# Patient Record
Sex: FEMALE | Race: BLACK OR AFRICAN AMERICAN | NOT HISPANIC OR LATINO | Employment: UNEMPLOYED | URBAN - METROPOLITAN AREA
[De-identification: names, ages, dates, MRNs, and addresses within clinical notes are randomized per-mention and may not be internally consistent; named-entity substitution may affect disease eponyms.]

---

## 2017-06-01 ENCOUNTER — LAB VISIT (OUTPATIENT)
Dept: LAB | Facility: HOSPITAL | Age: 42
End: 2017-06-01
Attending: INTERNAL MEDICINE
Payer: MEDICAID

## 2017-06-01 ENCOUNTER — OFFICE VISIT (OUTPATIENT)
Dept: FAMILY MEDICINE | Facility: CLINIC | Age: 42
End: 2017-06-01
Payer: MEDICAID

## 2017-06-01 VITALS
OXYGEN SATURATION: 99 % | DIASTOLIC BLOOD PRESSURE: 88 MMHG | WEIGHT: 221.56 LBS | BODY MASS INDEX: 43.5 KG/M2 | HEART RATE: 93 BPM | SYSTOLIC BLOOD PRESSURE: 138 MMHG | HEIGHT: 60 IN | RESPIRATION RATE: 17 BRPM | TEMPERATURE: 98 F

## 2017-06-01 DIAGNOSIS — I10 ESSENTIAL HYPERTENSION: Primary | ICD-10-CM

## 2017-06-01 DIAGNOSIS — I10 ESSENTIAL HYPERTENSION: ICD-10-CM

## 2017-06-01 DIAGNOSIS — R20.2 PARESTHESIA: ICD-10-CM

## 2017-06-01 DIAGNOSIS — R06.01 ORTHOPNEA: ICD-10-CM

## 2017-06-01 DIAGNOSIS — R93.1 DECREASED CARDIAC EJECTION FRACTION: ICD-10-CM

## 2017-06-01 LAB
ALBUMIN SERPL BCP-MCNC: 3.4 G/DL
ALP SERPL-CCNC: 67 U/L
ALT SERPL W/O P-5'-P-CCNC: 10 U/L
ANION GAP SERPL CALC-SCNC: 8 MMOL/L
AST SERPL-CCNC: 18 U/L
BASOPHILS # BLD AUTO: 0.05 K/UL
BASOPHILS NFR BLD: 0.6 %
BILIRUB SERPL-MCNC: 0.8 MG/DL
BUN SERPL-MCNC: 10 MG/DL
CALCIUM SERPL-MCNC: 9.6 MG/DL
CHLORIDE SERPL-SCNC: 103 MMOL/L
CO2 SERPL-SCNC: 26 MMOL/L
CREAT SERPL-MCNC: 0.8 MG/DL
DIFFERENTIAL METHOD: ABNORMAL
EOSINOPHIL # BLD AUTO: 0.1 K/UL
EOSINOPHIL NFR BLD: 1.2 %
ERYTHROCYTE [DISTWIDTH] IN BLOOD BY AUTOMATED COUNT: 16 %
EST. GFR  (AFRICAN AMERICAN): >60 ML/MIN/1.73 M^2
EST. GFR  (NON AFRICAN AMERICAN): >60 ML/MIN/1.73 M^2
GLUCOSE SERPL-MCNC: 71 MG/DL
HCT VFR BLD AUTO: 36.1 %
HGB BLD-MCNC: 11.8 G/DL
LYMPHOCYTES # BLD AUTO: 2 K/UL
LYMPHOCYTES NFR BLD: 22.1 %
MAGNESIUM SERPL-MCNC: 2 MG/DL
MCH RBC QN AUTO: 24.7 PG
MCHC RBC AUTO-ENTMCNC: 32.7 %
MCV RBC AUTO: 76 FL
MONOCYTES # BLD AUTO: 0.7 K/UL
MONOCYTES NFR BLD: 8 %
NEUTROPHILS # BLD AUTO: 6.1 K/UL
NEUTROPHILS NFR BLD: 68.1 %
PLATELET # BLD AUTO: 332 K/UL
PMV BLD AUTO: 9.8 FL
POTASSIUM SERPL-SCNC: 4.2 MMOL/L
PROT SERPL-MCNC: 8 G/DL
RBC # BLD AUTO: 4.77 M/UL
SODIUM SERPL-SCNC: 137 MMOL/L
TSH SERPL DL<=0.005 MIU/L-ACNC: 1.2 UIU/ML
WBC # BLD AUTO: 8.91 K/UL

## 2017-06-01 PROCEDURE — 36415 COLL VENOUS BLD VENIPUNCTURE: CPT | Mod: PN

## 2017-06-01 PROCEDURE — 83735 ASSAY OF MAGNESIUM: CPT

## 2017-06-01 PROCEDURE — 85025 COMPLETE CBC W/AUTO DIFF WBC: CPT

## 2017-06-01 PROCEDURE — 99214 OFFICE O/P EST MOD 30 MIN: CPT | Mod: S$PBB,,, | Performed by: INTERNAL MEDICINE

## 2017-06-01 PROCEDURE — 82607 VITAMIN B-12: CPT

## 2017-06-01 PROCEDURE — 84443 ASSAY THYROID STIM HORMONE: CPT

## 2017-06-01 PROCEDURE — 80053 COMPREHEN METABOLIC PANEL: CPT

## 2017-06-01 PROCEDURE — 99999 PR PBB SHADOW E&M-EST. PATIENT-LVL III: CPT | Mod: PBBFAC,,, | Performed by: INTERNAL MEDICINE

## 2017-06-02 LAB — VIT B12 SERPL-MCNC: 806 PG/ML

## 2017-06-05 ENCOUNTER — TELEPHONE (OUTPATIENT)
Dept: FAMILY MEDICINE | Facility: CLINIC | Age: 42
End: 2017-06-05

## 2017-06-05 NOTE — TELEPHONE ENCOUNTER
Spoke with patient and advised of results and recommendations below    Appointment with Cardiology scheduled 6-6-17    Verbalized understating re: will follow up with results after   Once her echo has been completed

## 2017-06-06 ENCOUNTER — HOSPITAL ENCOUNTER (OUTPATIENT)
Dept: CARDIOLOGY | Facility: HOSPITAL | Age: 42
Discharge: HOME | End: 2017-06-06
Attending: INTERNAL MEDICINE
Payer: MEDICAID

## 2017-06-06 DIAGNOSIS — R93.1 DECREASED CARDIAC EJECTION FRACTION: ICD-10-CM

## 2017-06-06 DIAGNOSIS — R06.01 ORTHOPNEA: ICD-10-CM

## 2017-06-06 LAB
DIASTOLIC DYSFUNCTION: NO
ESTIMATED PA SYSTOLIC PRESSURE: 28.2
RETIRED EF AND QEF - SEE NOTES: 55 (ref 55–65)
TRICUSPID VALVE REGURGITATION: NORMAL

## 2017-06-06 PROCEDURE — 93306 TTE W/DOPPLER COMPLETE: CPT

## 2017-06-06 PROCEDURE — 93306 TTE W/DOPPLER COMPLETE: CPT | Mod: 26,,, | Performed by: INTERNAL MEDICINE

## 2017-06-07 ENCOUNTER — TELEPHONE (OUTPATIENT)
Dept: FAMILY MEDICINE | Facility: CLINIC | Age: 42
End: 2017-06-07

## 2017-08-01 DIAGNOSIS — I10 ESSENTIAL HYPERTENSION: ICD-10-CM

## 2017-08-01 RX ORDER — METOPROLOL SUCCINATE 100 MG/1
TABLET, EXTENDED RELEASE ORAL
Qty: 30 TABLET | Refills: 2 | Status: SHIPPED | OUTPATIENT
Start: 2017-08-01 | End: 2017-09-11 | Stop reason: SDUPTHER

## 2017-09-11 ENCOUNTER — OFFICE VISIT (OUTPATIENT)
Dept: FAMILY MEDICINE | Facility: CLINIC | Age: 42
End: 2017-09-11
Payer: MEDICAID

## 2017-09-11 VITALS
BODY MASS INDEX: 43.72 KG/M2 | HEART RATE: 85 BPM | OXYGEN SATURATION: 99 % | TEMPERATURE: 98 F | WEIGHT: 222.69 LBS | SYSTOLIC BLOOD PRESSURE: 124 MMHG | HEIGHT: 60 IN | DIASTOLIC BLOOD PRESSURE: 98 MMHG | RESPIRATION RATE: 17 BRPM

## 2017-09-11 DIAGNOSIS — M25.562 ACUTE PAIN OF LEFT KNEE: Primary | ICD-10-CM

## 2017-09-11 DIAGNOSIS — I10 ESSENTIAL HYPERTENSION: ICD-10-CM

## 2017-09-11 PROCEDURE — 3008F BODY MASS INDEX DOCD: CPT | Mod: ,,, | Performed by: INTERNAL MEDICINE

## 2017-09-11 PROCEDURE — 3080F DIAST BP >= 90 MM HG: CPT | Mod: ,,, | Performed by: INTERNAL MEDICINE

## 2017-09-11 PROCEDURE — 99999 PR PBB SHADOW E&M-EST. PATIENT-LVL III: CPT | Mod: PBBFAC,,, | Performed by: INTERNAL MEDICINE

## 2017-09-11 PROCEDURE — 99214 OFFICE O/P EST MOD 30 MIN: CPT | Mod: S$PBB,,, | Performed by: INTERNAL MEDICINE

## 2017-09-11 PROCEDURE — 3074F SYST BP LT 130 MM HG: CPT | Mod: ,,, | Performed by: INTERNAL MEDICINE

## 2017-09-11 PROCEDURE — 99213 OFFICE O/P EST LOW 20 MIN: CPT | Mod: PBBFAC,PN | Performed by: INTERNAL MEDICINE

## 2017-09-11 RX ORDER — METOPROLOL SUCCINATE 100 MG/1
TABLET, EXTENDED RELEASE ORAL
Qty: 30 TABLET | Refills: 5 | Status: SHIPPED | OUTPATIENT
Start: 2017-09-11 | End: 2018-08-29 | Stop reason: SDUPTHER

## 2017-09-11 RX ORDER — MELOXICAM 15 MG/1
15 TABLET ORAL DAILY
Qty: 301 TABLET | Refills: 1 | Status: SHIPPED | OUTPATIENT
Start: 2017-09-11 | End: 2017-09-12 | Stop reason: SDUPTHER

## 2017-09-11 NOTE — PROGRESS NOTES
Subjective:       Patient ID: Asha Paige is a 41 y.o. female.    Chief Complaint: Medication Refill and Knee Pain    She presents for follow-up of her blood pressure and for refills.  She reports compliance with her medications.  She does note that her blood pressure is better when she is eating properly.  Otherwise she complains of a two-month history of intermittent left medial knee pain which is been more constant over the past 2 weeks.  It improves with elevation.  It is worse with bending and walking, particularly upstairs.  He does radiate times.  She's had no improvement with Motrin.  Her pain as 5 out of 10.      Medication Refill   This is a new problem. The current episode started more than 1 month ago. The problem occurs constantly. The problem has been gradually worsening. Associated symptoms include arthralgias. Pertinent negatives include no joint swelling. The symptoms are aggravated by bending and walking. She has tried NSAIDs for the symptoms. The treatment provided no relief.   Knee Pain        Review of Systems   Musculoskeletal: Positive for arthralgias. Negative for joint swelling.       Objective:      Physical Exam   Constitutional: She is oriented to person, place, and time. She appears well-developed and well-nourished. No distress.   HENT:   Head: Normocephalic and atraumatic.   Right Ear: External ear normal.   Left Ear: External ear normal.   Eyes: Conjunctivae are normal. Pupils are equal, round, and reactive to light. Left eye exhibits no discharge. No scleral icterus.   Cardiovascular: Normal rate, regular rhythm and normal heart sounds.  Exam reveals no gallop and no friction rub.    No murmur heard.  Pulmonary/Chest: Effort normal and breath sounds normal. No respiratory distress. She has no wheezes. She has no rales.   Musculoskeletal:        Left knee: She exhibits normal range of motion, no swelling and no effusion. Tenderness found. Medial joint line tenderness noted.    Neurological: She is alert and oriented to person, place, and time. No cranial nerve deficit.   Skin: Skin is warm and dry.   Psychiatric: She has a normal mood and affect.   Vitals reviewed.      Assessment:       1. Acute pain of left knee    2. Essential hypertension        Plan:       Asha was seen today for medication refill and knee pain.    Diagnoses and all orders for this visit:    Acute pain of left knee - 2 month history of left knee pain worse over the past 2 weeks.  No history of injury.  Trial of meloxicam.  F/u if no improvement.  Will consider xray and injection at that time.   -     meloxicam (MOBIC) 15 MG tablet; Take 1 tablet (15 mg total) by mouth once daily.    Essential hypertension - DBP elevated today.  F/u 1 week for bp check.  Continue current medication for now.  -     metoprolol succinate (TOPROL-XL) 100 MG 24 hr tablet; TAKE 1 TABLET(100 MG) BY MOUTH DAILY       F/u 1 week

## 2017-09-12 DIAGNOSIS — M25.562 ACUTE PAIN OF LEFT KNEE: ICD-10-CM

## 2017-09-12 RX ORDER — MELOXICAM 15 MG/1
15 TABLET ORAL DAILY
Qty: 30 TABLET | Refills: 1 | Status: SHIPPED | OUTPATIENT
Start: 2017-09-12 | End: 2019-12-10 | Stop reason: CLARIF

## 2017-09-12 NOTE — TELEPHONE ENCOUNTER
Spoke to the pharmacist @ [facility] they never received the script for Mobic please resend.    Contact: Self   Patient says her medication never went through to the pharmacy. Please call at [phone]

## 2017-12-15 ENCOUNTER — HOSPITAL ENCOUNTER (EMERGENCY)
Facility: HOSPITAL | Age: 42
Discharge: HOME | End: 2017-12-15
Attending: EMERGENCY MEDICINE
Payer: MEDICAID

## 2017-12-15 VITALS
WEIGHT: 223 LBS | RESPIRATION RATE: 20 BRPM | TEMPERATURE: 99 F | BODY MASS INDEX: 43.78 KG/M2 | DIASTOLIC BLOOD PRESSURE: 101 MMHG | HEART RATE: 100 BPM | HEIGHT: 60 IN | SYSTOLIC BLOOD PRESSURE: 171 MMHG | OXYGEN SATURATION: 100 %

## 2017-12-15 DIAGNOSIS — J32.9 SINUSITIS, UNSPECIFIED CHRONICITY, UNSPECIFIED LOCATION: Primary | ICD-10-CM

## 2017-12-15 LAB
B-HCG UR QL: NEGATIVE
CTP QC/QA: YES

## 2017-12-15 PROCEDURE — 81025 URINE PREGNANCY TEST: CPT | Performed by: PHYSICIAN ASSISTANT

## 2017-12-15 PROCEDURE — 99283 EMERGENCY DEPT VISIT LOW MDM: CPT | Mod: 25

## 2017-12-15 RX ORDER — AMOXICILLIN AND CLAVULANATE POTASSIUM 875; 125 MG/1; MG/1
1 TABLET, FILM COATED ORAL 2 TIMES DAILY
Qty: 28 TABLET | Refills: 0 | Status: SHIPPED | OUTPATIENT
Start: 2017-12-15 | End: 2017-12-29

## 2017-12-15 RX ORDER — LORATADINE 10 MG/1
10 TABLET ORAL DAILY
COMMUNITY
End: 2019-12-10 | Stop reason: CLARIF

## 2017-12-15 NOTE — ED PROVIDER NOTES
"Encounter Date: 12/15/2017    SCRIBE #1 NOTE: I, Barrett Loza DINA, am scribing for, and in the presence of,  Gerard Calle PA-C. I have scribed the following portions of the note - Other sections scribed: HPI and ROS.       History     Chief Complaint   Patient presents with    Nasal Congestion     "stuffy nose and my ears are starting to hurt, off and on for about a wk"     CC: Nasal Congestion     HPI: This 42 y.o. female with CHF, HTN, and pulmonary embolism  presents to the ED c/o acute onset, nasal congestion with cough and rhinorrhea x1 week. Pt states her symptoms began as a cough and progressively worsened. Pt reports hx of sinus problems. She describes the episode as, "I have had no relief, its like it comes and goes." Pt reports she has a productive cough and describes the sputum as yellow. Pt also reports ear pain when she blows her nose. Pt reports using Tylenol, Claritin, and Flonase with minimal alleviation. Pt denies use of antibiotics. No alleviating factors. Pt denies fever, vomiting, and diarrhea        The history is provided by the patient. No  was used.     Review of patient's allergies indicates:   Allergen Reactions    Lisinopril Other (See Comments)     Angioedema     Past Medical History:   Diagnosis Date    CHF (congestive heart failure)     History of pulmonary embolism 2005    Hypertension      Past Surgical History:   Procedure Laterality Date    Plate in right arm        Family History   Problem Relation Age of Onset    Cancer Maternal Grandmother     Breast cancer Neg Hx     Ovarian cancer Neg Hx      Social History   Substance Use Topics    Smoking status: Never Smoker    Smokeless tobacco: Never Used    Alcohol use Yes      Comment: Social      Review of Systems   Constitutional: Negative for fever.   HENT: Positive for congestion, ear pain and rhinorrhea. Negative for sore throat.    Respiratory: Positive for cough. Negative for shortness of breath.  "   Cardiovascular: Negative for chest pain.   Gastrointestinal: Negative for nausea.   Genitourinary: Negative for dysuria.   Musculoskeletal: Negative for back pain.   Skin: Negative for rash.   Neurological: Negative for weakness.   Hematological: Does not bruise/bleed easily.       Physical Exam     Initial Vitals [12/15/17 1454]   BP Pulse Resp Temp SpO2   (!) 171/101 103 20 99.1 °F (37.3 °C) 100 %      MAP       124.33         Physical Exam    Vitals reviewed.  Constitutional: She appears well-developed and well-nourished. She is not diaphoretic. No distress.   HENT:   Head: Normocephalic and atraumatic.   Right Ear: External ear normal.   Left Ear: External ear normal.   Nose: Mucosal edema and rhinorrhea present. Right sinus exhibits maxillary sinus tenderness and frontal sinus tenderness. Left sinus exhibits maxillary sinus tenderness and frontal sinus tenderness.   Mouth/Throat: Oropharynx is clear and moist. No oropharyngeal exudate.   Eyes: Conjunctivae are normal. No scleral icterus.   Neck: Normal range of motion. Neck supple. No JVD present.   Cardiovascular: Normal rate, regular rhythm, normal heart sounds and intact distal pulses.   Pulmonary/Chest: Breath sounds normal. No respiratory distress. She has no wheezes. She has no rhonchi. She has no rales. She exhibits no tenderness.   Musculoskeletal: Normal range of motion. She exhibits no edema or tenderness.   Lymphadenopathy:     She has no cervical adenopathy.   Neurological: She is alert and oriented to person, place, and time.   Skin: Skin is warm and dry.         ED Course   Procedures  Labs Reviewed   POCT URINE PREGNANCY             Medical Decision Making:   Initial Assessment:   42-year-old female with hypertension complains of sinus congestion with rhinorrhea, ear pain, and cough ×1 week with slight improvement with conservative treatment, then worsening again yesterday and today.  She denies fever, shortness of breath,  myalgias.  Differential Diagnosis:   Bacterial sinusitis, ALLERGIC rhinitis, other viral URI, and less likely otitis media, mastoiditis, venous sinus thrombosis  ED Management:  Patient presents mildly uncomfortable, nontoxic appearing, hypertensive and afebrile.  She takes metoprolol for blood pressure but has not taken it today.  No chest pain or shortness of breath.  No leg swelling.  She has sinus tenderness with significant nasal mucosal edema and rhinorrhea.  Given the progression of her symptoms despite conservative treatment I suspect this may be a bacterial sinusitis that would benefit with antibiotics and will treat with Augmentin.  Patient encouraged to continue using antihistamine, and intranasal steroid, and to follow-up with PCP.  She was also encouraged to continue taking her blood pressure medication as prescribed.  I do not suspect endorgan damage and believe patient is safe for discharge.            Scribe Attestation:   Scribe #1: I performed the above scribed service and the documentation accurately describes the services I performed. I attest to the accuracy of the note.    Attending Attestation:           Physician Attestation for Scribe:  Physician Attestation Statement for Scribe #1: I, Gerard Calle PA-C, reviewed documentation, as scribed by Barrett Loza II in my presence, and it is both accurate and complete.                 ED Course      Clinical Impression:   The encounter diagnosis was Sinusitis, unspecified chronicity, unspecified location.                           Gerard Calle PA-C  12/15/17 1384

## 2018-08-18 DIAGNOSIS — I10 ESSENTIAL HYPERTENSION: ICD-10-CM

## 2018-08-19 RX ORDER — METOPROLOL SUCCINATE 100 MG/1
TABLET, EXTENDED RELEASE ORAL
Qty: 30 TABLET | Refills: 5 | OUTPATIENT
Start: 2018-08-19

## 2018-08-29 DIAGNOSIS — I10 ESSENTIAL HYPERTENSION: ICD-10-CM

## 2018-08-29 RX ORDER — METOPROLOL SUCCINATE 100 MG/1
TABLET, EXTENDED RELEASE ORAL
Qty: 30 TABLET | Refills: 1 | Status: SHIPPED | OUTPATIENT
Start: 2018-08-29 | End: 2018-11-26 | Stop reason: SDUPTHER

## 2018-08-29 NOTE — TELEPHONE ENCOUNTER
----- Message from Alvina Jones sent at 8/29/2018  7:30 AM CDT -----  Contact: Self/[phone]  Refill:  metoprolol succinate (TOPROL-XL) 100 MG 24 hr tablet    .  [facility] [address] [address] [place] [address] [address]  [facility] [address] [place] [address] [address]   [facility] [place] [place] [address]  Phone: [phone] Fax: [phone]    Thank you.

## 2018-11-26 DIAGNOSIS — I10 ESSENTIAL HYPERTENSION: ICD-10-CM

## 2018-11-26 RX ORDER — METOPROLOL SUCCINATE 100 MG/1
TABLET, EXTENDED RELEASE ORAL
Qty: 15 TABLET | Refills: 0 | Status: SHIPPED | OUTPATIENT
Start: 2018-11-26 | End: 2018-12-14 | Stop reason: SDUPTHER

## 2018-11-26 NOTE — TELEPHONE ENCOUNTER
----- Message from Carolyn santiagojosep sent at 11/26/2018 11:53 AM CST -----  Contact: self - [phone]  Refill request for --- metoprolol succinate (TOPROL-XL) 100 MG 24 hr tablet    ...  [facility] [address] [facility] [place] [address]  [address] [place] [address] [address] [place] [address]  Phone: [phone] Fax: [phone]

## 2018-11-26 NOTE — TELEPHONE ENCOUNTER
Called and spoke with the patient. Strongly encouraged her to attend her appointment on 11/30/2018 at 3:40pm with Dr. Britton. Patient assured me that she will attend appointment. Provided patient with facility address. She states she does not have enough medication to last until 11/30/18 appointment and to please send a refill of Toprol-Xl 100mg to [facility] on [address]. [facility] for MD review.

## 2018-11-26 NOTE — TELEPHONE ENCOUNTER
----- Message from Carolyn santiagojosep sent at 11/26/2018 11:53 AM CST -----  Contact: self - [phone]  Refill request for --- metoprolol succinate (TOPROL-XL) 100 MG 24 hr tablet    ...  [facility] [address] [address] [place] [address]  [address] [place] [address] [address] [place] [address]  Phone: [phone] Fax: [phone]

## 2018-12-14 ENCOUNTER — OFFICE VISIT (OUTPATIENT)
Dept: FAMILY MEDICINE | Facility: CLINIC | Age: 43
End: 2018-12-14
Payer: MEDICAID

## 2018-12-14 ENCOUNTER — LAB VISIT (OUTPATIENT)
Dept: LAB | Facility: HOSPITAL | Age: 43
End: 2018-12-14
Attending: INTERNAL MEDICINE
Payer: MEDICAID

## 2018-12-14 VITALS
HEART RATE: 83 BPM | BODY MASS INDEX: 45.2 KG/M2 | TEMPERATURE: 98 F | DIASTOLIC BLOOD PRESSURE: 86 MMHG | OXYGEN SATURATION: 96 % | SYSTOLIC BLOOD PRESSURE: 128 MMHG | WEIGHT: 230.25 LBS | HEIGHT: 60 IN

## 2018-12-14 DIAGNOSIS — Z13.1 SCREENING FOR DIABETES MELLITUS (DM): ICD-10-CM

## 2018-12-14 DIAGNOSIS — F41.9 ANXIETY: ICD-10-CM

## 2018-12-14 DIAGNOSIS — E04.9 GOITER: ICD-10-CM

## 2018-12-14 DIAGNOSIS — I10 ESSENTIAL HYPERTENSION: Primary | ICD-10-CM

## 2018-12-14 DIAGNOSIS — D64.9 ANEMIA, UNSPECIFIED TYPE: ICD-10-CM

## 2018-12-14 DIAGNOSIS — I10 ESSENTIAL HYPERTENSION: ICD-10-CM

## 2018-12-14 DIAGNOSIS — Z12.4 SCREENING FOR CERVICAL CANCER: ICD-10-CM

## 2018-12-14 DIAGNOSIS — E66.01 CLASS 3 SEVERE OBESITY DUE TO EXCESS CALORIES WITH SERIOUS COMORBIDITY AND BODY MASS INDEX (BMI) OF 40.0 TO 44.9 IN ADULT: ICD-10-CM

## 2018-12-14 LAB
ALBUMIN SERPL BCP-MCNC: 3.2 G/DL
ALP SERPL-CCNC: 72 U/L
ALT SERPL W/O P-5'-P-CCNC: 15 U/L
ANION GAP SERPL CALC-SCNC: 8 MMOL/L
AST SERPL-CCNC: 18 U/L
BASOPHILS # BLD AUTO: 0.07 K/UL
BASOPHILS NFR BLD: 0.7 %
BILIRUB SERPL-MCNC: 0.4 MG/DL
BUN SERPL-MCNC: 13 MG/DL
CALCIUM SERPL-MCNC: 9.2 MG/DL
CHLORIDE SERPL-SCNC: 105 MMOL/L
CO2 SERPL-SCNC: 28 MMOL/L
CREAT SERPL-MCNC: 0.7 MG/DL
DIFFERENTIAL METHOD: ABNORMAL
EOSINOPHIL # BLD AUTO: 0.2 K/UL
EOSINOPHIL NFR BLD: 2.1 %
ERYTHROCYTE [DISTWIDTH] IN BLOOD BY AUTOMATED COUNT: 17 %
EST. GFR  (AFRICAN AMERICAN): >60 ML/MIN/1.73 M^2
EST. GFR  (NON AFRICAN AMERICAN): >60 ML/MIN/1.73 M^2
ESTIMATED AVG GLUCOSE: 105 MG/DL
FERRITIN SERPL-MCNC: 17 NG/ML
GLUCOSE SERPL-MCNC: 78 MG/DL
HBA1C MFR BLD HPLC: 5.3 %
HCT VFR BLD AUTO: 32.8 %
HGB BLD-MCNC: 10.1 G/DL
IMM GRANULOCYTES # BLD AUTO: 0.05 K/UL
IMM GRANULOCYTES NFR BLD AUTO: 0.5 %
IRON SERPL-MCNC: 21 UG/DL
LYMPHOCYTES # BLD AUTO: 2.3 K/UL
LYMPHOCYTES NFR BLD: 22.9 %
MCH RBC QN AUTO: 22.4 PG
MCHC RBC AUTO-ENTMCNC: 30.8 G/DL
MCV RBC AUTO: 73 FL
MONOCYTES # BLD AUTO: 0.9 K/UL
MONOCYTES NFR BLD: 9.1 %
NEUTROPHILS # BLD AUTO: 6.5 K/UL
NEUTROPHILS NFR BLD: 64.7 %
NRBC BLD-RTO: 0 /100 WBC
PLATELET # BLD AUTO: 358 K/UL
PMV BLD AUTO: 10.2 FL
POTASSIUM SERPL-SCNC: 3.9 MMOL/L
PROT SERPL-MCNC: 8 G/DL
RBC # BLD AUTO: 4.51 M/UL
SATURATED IRON: 5 %
SODIUM SERPL-SCNC: 141 MMOL/L
TOTAL IRON BINDING CAPACITY: 460 UG/DL
TRANSFERRIN SERPL-MCNC: 311 MG/DL
TSH SERPL DL<=0.005 MIU/L-ACNC: 0.7 UIU/ML
WBC # BLD AUTO: 10.1 K/UL

## 2018-12-14 PROCEDURE — 80053 COMPREHEN METABOLIC PANEL: CPT

## 2018-12-14 PROCEDURE — 85025 COMPLETE CBC W/AUTO DIFF WBC: CPT

## 2018-12-14 PROCEDURE — 84443 ASSAY THYROID STIM HORMONE: CPT

## 2018-12-14 PROCEDURE — 99214 OFFICE O/P EST MOD 30 MIN: CPT | Mod: PBBFAC,PO | Performed by: INTERNAL MEDICINE

## 2018-12-14 PROCEDURE — 36415 COLL VENOUS BLD VENIPUNCTURE: CPT | Mod: PO

## 2018-12-14 PROCEDURE — 83540 ASSAY OF IRON: CPT

## 2018-12-14 PROCEDURE — 82728 ASSAY OF FERRITIN: CPT

## 2018-12-14 PROCEDURE — 99999 PR PBB SHADOW E&M-EST. PATIENT-LVL IV: CPT | Mod: PBBFAC,,, | Performed by: INTERNAL MEDICINE

## 2018-12-14 PROCEDURE — 83036 HEMOGLOBIN GLYCOSYLATED A1C: CPT

## 2018-12-14 PROCEDURE — 99214 OFFICE O/P EST MOD 30 MIN: CPT | Mod: S$PBB,,, | Performed by: INTERNAL MEDICINE

## 2018-12-14 RX ORDER — METOPROLOL SUCCINATE 100 MG/1
TABLET, EXTENDED RELEASE ORAL
Qty: 90 TABLET | Refills: 1 | Status: SHIPPED | OUTPATIENT
Start: 2018-12-14 | End: 2019-06-20 | Stop reason: SDUPTHER

## 2018-12-14 RX ORDER — ESCITALOPRAM OXALATE 5 MG/1
5 TABLET ORAL DAILY
Qty: 30 TABLET | Refills: 0 | Status: SHIPPED | OUTPATIENT
Start: 2018-12-14 | End: 2019-12-10 | Stop reason: CLARIF

## 2018-12-14 NOTE — PATIENT INSTRUCTIONS
Escitalopram tablets  What is this medicine?  ESCITALOPRAM (es sye KELSIE oh pram) is used to treat depression and certain types of anxiety.  How should I use this medicine?  Take this medicine by mouth with a glass of water. Follow the directions on the prescription label. You can take it with or without food. If it upsets your stomach, take it with food. Take your medicine at regular intervals. Do not take it more often than directed. Do not stop taking this medicine suddenly except upon the advice of your doctor. Stopping this medicine too quickly may cause serious side effects or your condition may worsen.  A special MedGuide will be given to you by the pharmacist with each prescription and refill. Be sure to read this information carefully each time.  Talk to your pediatrician regarding the use of this medicine in children. Special care may be needed.  What side effects may I notice from receiving this medicine?  Side effects that you should report to your doctor or health care professional as soon as possible:  · allergic reactions like skin rash, itching or hives, swelling of the face, lips, or tongue  · confusion  · feeling faint or lightheaded, falls  · fast talking and excited feelings or actions that are out of control  · hallucination, loss of contact with reality  · seizures  · suicidal thoughts or other mood changes  · unusual bleeding or bruising  Side effects that usually do not require medical attention (report to your doctor or health care professional if they continue or are bothersome):  · blurred vision  · changes in appetite  · change in sex drive or performance  · headache  · increased sweating  · nausea  What may interact with this medicine?  Do not take this medicine with any of the following medications:  · certain medicines for fungal infections like fluconazole, itraconazole, ketoconazole, posaconazole,  voriconazole  · cisapride  · citalopram  · dofetilide  · dronedarone  · linezolid  · MAOIs like Carbex, Eldepryl, Marplan, Nardil, and Parnate  · methylene blue (injected into a vein)  · pimozide  · thioridazine  · ziprasidone  This medicine may also interact with the following medications:  · alcohol  · aspirin and aspirin-like medicines  · carbamazepine  · certain medicines for depression, anxiety, or psychotic disturbances  · certain medicines for migraine headache like almotriptan, eletriptan, frovatriptan, naratriptan, rizatriptan, sumatriptan, zolmitriptan  · certain medicines for sleep  · certain medicines that treat or prevent blood clots like warfarin, enoxaparin, dalteparin  · cimetidine  · diuretics  · fentanyl  · furazolidone  · isoniazid  · lithium  · metoprolol  · NSAIDs, medicines for pain and inflammation, like ibuprofen or naproxen  · other medicines that prolong the QT interval (cause an abnormal heart rhythm)  · procarbazine  · rasagiline  · supplements like Rosie's wort, kava kava, valerian  · tramadol  · tryptophan  What if I miss a dose?  If you miss a dose, take it as soon as you can. If it is almost time for your next dose, take only that dose. Do not take double or extra doses.  Where should I keep my medicine?  Keep out of reach of children.  Store at room temperature between 15 and 30 degrees C (59 and 86 degrees F). Throw away any unused medicine after the expiration date.  What should I tell my health care provider before I take this medicine?  They need to know if you have any of these conditions:  · bipolar disorder or a family history of bipolar disorder  · diabetes  · glaucoma  · heart disease  · kidney or liver disease  · receiving electroconvulsive therapy  · seizures (convulsions)  · suicidal thoughts, plans, or attempt by you or a family member  · an unusual or allergic reaction to escitalopram, the related drug citalopram, other medicines, foods, dyes, or  preservatives  · pregnant or trying to become pregnant  · breast-feeding  What should I watch for while using this medicine?  Tell your doctor if your symptoms do not get better or if they get worse. Visit your doctor or health care professional for regular checks on your progress. Because it may take several weeks to see the full effects of this medicine, it is important to continue your treatment as prescribed by your doctor.  Patients and their families should watch out for new or worsening thoughts of suicide or depression. Also watch out for sudden changes in feelings such as feeling anxious, agitated, panicky, irritable, hostile, aggressive, impulsive, severely restless, overly excited and hyperactive, or not being able to sleep. If this happens, especially at the beginning of treatment or after a change in dose, call your health care professional.  You may get drowsy or dizzy. Do not drive, use machinery, or do anything that needs mental alertness until you know how this medicine affects you. Do not stand or sit up quickly, especially if you are an older patient. This reduces the risk of dizzy or fainting spells. Alcohol may interfere with the effect of this medicine. Avoid alcoholic drinks.  Your mouth may get dry. Chewing sugarless gum or sucking hard candy, and drinking plenty of water may help. Contact your doctor if the problem does not go away or is severe.  NOTE:This sheet is a summary. It may not cover all possible information. If you have questions about this medicine, talk to your doctor, pharmacist, or health care provider. Copyright© 2017 [employer]

## 2018-12-14 NOTE — PROGRESS NOTES
Subjective:       Chief Complaint  Chief Complaint   Patient presents with    Medication Refill       HPI  Asha Paige is a 43 y.o. female with multiple medical diagnoses as listed in the medical history and problem list that presents for HTN/med refill. Establishing care today.     History of HTN diagnosed after pregnancy with first son - 12 year ago  Developed congestive heart failure which resolved per recent Echo  No SOB/dyspnea  Has had palpitations in the past    Throat discomfort with ingestion of foods  Feels like she always has 'mucous' in the throat  Gets belching/gas discomfort  No history of heartburn  Will experience mild choking   No problems with swallowing liquids  No smoking     Has been experiecing anxiety while driving for the past year  Was concerned may be due to metoprolol   Only with certain maneuvers during driving    History of thyroid problems in the family - sister recently had thyroidectomy (twin)    Patient Care Team:  Geovanni Britton MD as PCP - General (Internal Medicine)      PAST MEDICAL HISTORY:  Past Medical History:   Diagnosis Date    CHF (congestive heart failure)     History of pulmonary embolism 2005    Hypertension        PAST SURGICAL HISTORY:  Past Surgical History:   Procedure Laterality Date    Plate in right arm          SOCIAL HISTORY:  Social History     Socioeconomic History    Marital status: Single     Spouse name: Not on file    Number of children: Not on file    Years of education: Not on file    Highest education level: Not on file   Social Needs    Financial resource strain: Not on file    Food insecurity - worry: Not on file    Food insecurity - inability: Not on file    Transportation needs - medical: Not on file    Transportation needs - non-medical: Not on file   Occupational History    Not on file   Tobacco Use    Smoking status: Never Smoker    Smokeless tobacco: Never Used   Substance and Sexual Activity    Alcohol use: Yes      Comment: Social     Drug use: No    Sexual activity: Not on file   Other Topics Concern    Not on file   Social History Narrative    Not on file       FAMILY HISTORY:  Family History   Problem Relation Age of Onset    Cancer Maternal Grandmother     Breast cancer Neg Hx     Ovarian cancer Neg Hx        ALLERGIES AND MEDICATIONS: updated and reviewed.  Review of patient's allergies indicates:   Allergen Reactions    Lisinopril Other (See Comments)     Angioedema     Current Outpatient Medications   Medication Sig Dispense Refill    cetirizine (ZYRTEC) 10 MG tablet Take 1 tablet (10 mg total) by mouth once daily. 30 tablet 2    escitalopram oxalate (LEXAPRO) 5 MG Tab Take 1 tablet (5 mg total) by mouth once daily. 30 tablet 0    loratadine (CLARITIN) 10 mg tablet Take 10 mg by mouth once daily.      meloxicam (MOBIC) 15 MG tablet Take 1 tablet (15 mg total) by mouth once daily. 30 tablet 1    metoprolol succinate (TOPROL-XL) 100 MG 24 hr tablet TAKE 1 TABLET(100 MG) BY MOUTH DAILY 90 tablet 1     No current facility-administered medications for this visit.          ROS  Review of Systems   Constitutional: Negative for appetite change, chills, fever and unexpected weight change.   HENT: Positive for trouble swallowing.    Eyes: Negative.    Respiratory: Negative for cough and shortness of breath.    Cardiovascular: Negative for chest pain, palpitations and leg swelling.   Gastrointestinal: Negative for abdominal pain, constipation, diarrhea, nausea and vomiting.   Genitourinary: Negative.    Musculoskeletal: Negative.    Skin: Negative.    Neurological: Negative for dizziness, light-headedness and headaches.   Psychiatric/Behavioral: Negative for dysphoric mood. The patient is nervous/anxious.          Objective:       Physical Exam  Vitals:    12/14/18 1457   BP: 128/86   Pulse: 83   Temp: 98.4 °F (36.9 °C)   TempSrc: Oral   SpO2: 96%   Weight: 104.5 kg (230 lb 4.3 oz)   Height: 5' (1.524 m)    Body mass  index is 44.97 kg/m².  Weight: 104.5 kg (230 lb 4.3 oz)   Height: 5' (152.4 cm)   Physical Exam   Constitutional: She appears well-developed. No distress.   HENT:   Head: Normocephalic and atraumatic.   Mouth/Throat: Oropharynx is clear and moist.   Eyes: Conjunctivae and EOM are normal. Pupils are equal, round, and reactive to light. No scleral icterus.   Neck: Normal range of motion. Neck supple. No tracheal deviation present. Thyromegaly (mild) present.   Cardiovascular: Normal rate, normal heart sounds and intact distal pulses.   No murmur heard.  Pulmonary/Chest: Effort normal and breath sounds normal.   Musculoskeletal: She exhibits no edema or deformity.   Lymphadenopathy:     She has no cervical adenopathy.   Neurological: She is alert. No cranial nerve deficit.   Skin: Skin is warm and dry.   Psychiatric: She has a normal mood and affect. Her behavior is normal.   Vitals reviewed.          Assessment:     1. Essential hypertension    2. Class 3 severe obesity due to excess calories with serious comorbidity and body mass index (BMI) of 40.0 to 44.9 in adult    3. Goiter    4. Anxiety    5. Anemia, unspecified type    6. Screening for diabetes mellitus (DM)    7. Screening for cervical cancer      Plan:     Asha was seen today for medication refill.    Diagnoses and all orders for this visit:    Essential hypertension  BP controlled presently - reviewed anti-hypertensive regimen - continue current therapy. Labs ordered as noted.  -     metoprolol succinate (TOPROL-XL) 100 MG 24 hr tablet; TAKE 1 TABLET(100 MG) BY MOUTH DAILY  -     TSH; Future  -     Comprehensive metabolic panel; Future    Class 3 severe obesity due to excess calories with serious comorbidity and body mass index (BMI) of 40.0 to 44.9 in adult  Body mass index is 44.97 kg/m².  Discussed diet/physical activity for maintenance of overall fitness and chronic disease management goals    Goiter  Mild thyromegaly likely on physical examination -  obtain ultrasound of thyroid and thyroid lab evaluation  -     US Soft Tissue Head Neck Thyroid; Future  -     TSH; Future    Anxiety  Situational anxiety during driving situations - discussed risks and benefits of starting SSRI therapy. Counseled regarding potential side effects of medication, including GI irritation, insomnia and nausea  -     escitalopram oxalate (LEXAPRO) 5 MG Tab; Take 1 tablet (5 mg total) by mouth once daily.    Anemia, unspecified type  History of anemia likely nutritional - will obtain CBC with iron screening  -     CBC auto differential; Future  -     Iron and TIBC; Future  -     Ferritin; Future    Screening for diabetes mellitus (DM)  Counseled regarding indications for diabetes mellitus screening and hemoglobin A1c ordered.  -     Hemoglobin A1c; Future    Screening for cervical cancer  Patient overdue for Pap - sees Dr Cunningham  Referral placed  -     Ambulatory referral to Obstetrics / Gynecology          Health Maintenance       Date Due Completion Date    TETANUS VACCINE 09/21/1995 ---    Pap Smear with HPV Cotest 12/10/2016 12/10/2013    Mammogram 09/21/2017 ---    Influenza Vaccine 08/01/2018 ---            Health Maintenance reviewed, addressed as per orders    Follow-up in about 4 weeks (around 1/11/2019) for anxiety/medication follow-up.    The patient expressed understanding and no barriers to adherence were identified.     1. The patient indicates understanding of these issues and agrees with the plan. Brief care plan is updated and reviewed with the patient as applicable.     2. The patient is given an After Visit Summary that lists all medications with directions, allergies, orders placed during this encounter and follow-up instructions.     3. I have reviewed the patient's medical information including past medical, family, and social history sections including the medications and allergies.     4. We discussed the patient's current medications. I reconciled the patient's  medication list and prepared and supplied needed refills.       Geovanni Britton MD  Internal Medicine-Pediatrics

## 2018-12-16 PROBLEM — E66.813 CLASS 3 SEVERE OBESITY WITH SERIOUS COMORBIDITY AND BODY MASS INDEX (BMI) OF 40.0 TO 44.9 IN ADULT: Status: ACTIVE | Noted: 2018-12-16

## 2018-12-16 PROBLEM — E66.01 CLASS 3 SEVERE OBESITY WITH SERIOUS COMORBIDITY AND BODY MASS INDEX (BMI) OF 40.0 TO 44.9 IN ADULT: Status: ACTIVE | Noted: 2018-12-16

## 2018-12-19 ENCOUNTER — TELEPHONE (OUTPATIENT)
Dept: FAMILY MEDICINE | Facility: CLINIC | Age: 43
End: 2018-12-19

## 2018-12-19 NOTE — TELEPHONE ENCOUNTER
I spoke with the patient regarding a referral to Gynecology, she states that she will be contacting Dr. Cunningham office at [facility] for appointment.

## 2019-01-05 ENCOUNTER — HOSPITAL ENCOUNTER (EMERGENCY)
Facility: HOSPITAL | Age: 44
Discharge: HOME | End: 2019-01-05
Attending: EMERGENCY MEDICINE
Payer: MEDICAID

## 2019-01-05 VITALS
SYSTOLIC BLOOD PRESSURE: 184 MMHG | TEMPERATURE: 98 F | DIASTOLIC BLOOD PRESSURE: 89 MMHG | HEART RATE: 88 BPM | RESPIRATION RATE: 20 BRPM | BODY MASS INDEX: 45.16 KG/M2 | WEIGHT: 230 LBS | HEIGHT: 60 IN | OXYGEN SATURATION: 100 %

## 2019-01-05 DIAGNOSIS — S80.12XA CONTUSION OF LEFT LOWER EXTREMITY, INITIAL ENCOUNTER: Primary | ICD-10-CM
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
## 2019-01-05 DIAGNOSIS — S40.021A CONTUSION OF RIGHT UPPER EXTREMITY, INITIAL ENCOUNTER: ICD-10-CM

## 2019-01-05 LAB
B-HCG UR QL: NEGATIVE
CTP QC/QA: YES

## 2019-01-05 PROCEDURE — 81025 URINE PREGNANCY TEST: CPT | Performed by: PHYSICIAN ASSISTANT

## 2019-01-05 PROCEDURE — 99283 EMERGENCY DEPT VISIT LOW MDM: CPT

## 2019-01-05 RX ORDER — IBUPROFEN 600 MG/1
600 TABLET ORAL EVERY 6 HOURS PRN
Qty: 15 TABLET | Refills: 0 | Status: SHIPPED | OUTPATIENT
Start: 2019-01-05 | End: 2019-06-29

## 2019-01-05 NOTE — ED TRIAGE NOTES
Patient stated that she fell while walking down wooden steps at apartment today. Patient stated that her left shin is sore and her right leg and hip are hurting as well. Pain 8/10. Aching in nature. Denies LOC or hitting her head. No medication PTA

## 2019-01-05 NOTE — ED PROVIDER NOTES
Encounter Date: 1/5/2019    SCRIBE #1 NOTE: I, Dipti Carty, am scribing for, and in the presence of,  Britt Varner PA-C. I have scribed the following portions of the note - Other sections scribed: HPI, ROS.       History     Chief Complaint   Patient presents with    Leg Pain     After falling through concrete steps. Step was third from bottom. Denies LOC or hitting head. C/o left shin pain. Pt has a small abraison to left shin with no bleeding.      CC: Leg Pain    HPI: This is a 44 y/o female with PMHx of CHF and HTN who presents to the ED via EMS for emergent evaluation of acute onset right hip pain and left shin pain that began after falling through brick steps at her apartment complex just PTA. Pt denies LOC or hitting head. Pt denies attempted tx. Pt denies any further symptoms.        The history is provided by the patient. No  was used.     Review of patient's allergies indicates:   Allergen Reactions    Lisinopril Other (See Comments)     Angioedema     Past Medical History:   Diagnosis Date    CHF (congestive heart failure)     History of pulmonary embolism 2005    Hypertension      Past Surgical History:   Procedure Laterality Date    Plate in right arm        Family History   Problem Relation Age of Onset    Cancer Maternal Grandmother     Breast cancer Neg Hx     Ovarian cancer Neg Hx      Social History     Tobacco Use    Smoking status: Never Smoker    Smokeless tobacco: Never Used   Substance Use Topics    Alcohol use: Yes     Comment: Social     Drug use: No     Review of Systems   Constitutional: Negative for chills and fever.   HENT: Negative for congestion, ear pain, rhinorrhea and sore throat.    Eyes: Negative for pain and visual disturbance.   Respiratory: Negative for cough and shortness of breath.    Cardiovascular: Negative for chest pain.   Gastrointestinal: Negative for abdominal pain, constipation, diarrhea, nausea and vomiting.   Genitourinary:  Negative for decreased urine volume, dysuria, vaginal bleeding, vaginal discharge and vaginal pain.   Musculoskeletal: Negative for back pain and neck pain.        (+) right hip pain  (+) left shin pain   Skin: Negative for rash.   Neurological: Negative for headaches.   Psychiatric/Behavioral: Negative for confusion.       Physical Exam     Initial Vitals [01/05/19 1155]   BP Pulse Resp Temp SpO2   (!) 184/86 80 18 98.8 °F (37.1 °C) 100 %      MAP       --         Physical Exam    Nursing note and vitals reviewed.  Constitutional: Vital signs are normal. She appears well-developed and well-nourished. She is not diaphoretic. She is cooperative.  Non-toxic appearance. She does not have a sickly appearance. She does not appear ill. No distress.   HENT:   Head: Normocephalic and atraumatic.   Right Ear: Tympanic membrane, external ear and ear canal normal.   Left Ear: Tympanic membrane, external ear and ear canal normal.   Nose: Nose normal.   Mouth/Throat: Uvula is midline, oropharynx is clear and moist and mucous membranes are normal. No oral lesions. No trismus in the jaw. No uvula swelling. No oropharyngeal exudate, posterior oropharyngeal edema or posterior oropharyngeal erythema.   Eyes: Conjunctivae, EOM and lids are normal. Pupils are equal, round, and reactive to light.   Neck: Trachea normal, normal range of motion, full passive range of motion without pain and phonation normal. Neck supple.   Cardiovascular: Normal rate, regular rhythm, normal heart sounds and intact distal pulses. Exam reveals no gallop and no friction rub.    No murmur heard.  Pulses:       Dorsalis pedis pulses are 2+ on the right side, and 2+ on the left side.   Capillary refill less than 2 sec bilateral lower extremities.   Pulmonary/Chest: Effort normal and breath sounds normal. No respiratory distress. She has no decreased breath sounds. She has no wheezes. She has no rhonchi. She has no rales.   Abdominal: Soft. Normal appearance and  bowel sounds are normal. She exhibits no distension and no mass. There is no tenderness. There is no rigidity, no rebound, no guarding and no CVA tenderness.   Musculoskeletal: Normal range of motion.        Right hip: Normal.        Left hip: Normal.        Right knee: Normal.        Left knee: Normal.        Right ankle: Normal.        Left ankle: Normal.        Right upper leg: She exhibits tenderness. She exhibits no bony tenderness, no swelling, no edema, no deformity and no laceration.        Left upper leg: Normal.        Right lower leg: Normal.        Left lower leg: She exhibits tenderness. She exhibits no bony tenderness, no swelling, no edema, no deformity and no laceration.        Legs:       Right foot: Normal.        Left foot: Normal.   Contusions of the left lower leg and right thigh. Patient ambulatory without assistance. No obvious deformity. No laceration or wound. No edema. Peripheral pulses intact. Peripheral sensation strength intact.   Neurological: She is alert and oriented to person, place, and time. She has normal strength. No cranial nerve deficit or sensory deficit. GCS eye subscore is 4. GCS verbal subscore is 5. GCS motor subscore is 6.   Skin: Skin is warm and dry. Capillary refill takes less than 2 seconds. No rash noted.   Psychiatric: She has a normal mood and affect. Her speech is normal and behavior is normal. Judgment and thought content normal. Cognition and memory are normal.         ED Course   Procedures  Labs Reviewed   POCT URINE PREGNANCY          Imaging Results          X-Ray Tibia Fibula 2 View Left (Final result)  Result time 01/05/19 13:19:08    Final result by Tawanda Jameson MD (01/05/19 13:19:08)                 Impression:      As above sign rib      Electronically signed by: Tawanda Jameson MD  Date:    01/05/2019  Time:    13:19             Narrative:    EXAMINATION:  XR TIBIA FIBULA 2 VIEW LEFT    CLINICAL HISTORY:  Unspecified fall, initial  encounter    TECHNIQUE:  AP and lateral views of the left tibia and fibula were performed.    COMPARISON:  None.    FINDINGS:  No acute osseous abnormality.  Soft tissues are unremarkable                                       APC / Resident Notes:   This is an evaluation of a 43 y.o. female that presents to the Emergency Department for left lower leg pain and right thigh pain 2/2 trip and fall while walking down concrete stairs. Denies head injury or LOC. Denies further symptoms. Denies attempted tx PTA.    Exam findings: Patient is non-toxic, afebrile and well appearing. Contusions of the left lower leg and right thigh. Patient ambulatory without assistance. No obvious deformity. No laceration or wound. No edema. Peripheral pulses intact. Peripheral sensation strength intact. Cap refill < 2 seconds in bilateral lower extremities.     If available, past records have been reviewed.  Vitals are reassuring.  Results:   UPT neg  Xray left tibia fibula unrevealing for acute fracture or dislocation. Soft tissues unremarkable.    My overall impression: lower extremity contusion, fall  DDx: leg pain, lower extremity contusion, fall, fracture, dislocation, abrasion, other    ED course: ice pack given. I will prescribe Motrin for pain and recommend RICE therapy. Pt stable for discharge. I will recommend follow-up with PCP.ED return precautions given.    The diagnosis and treatment plan have been discussed with the patient. All questions and concerns have been addressed. Patient expressed understanding. An educational information sheet was given to the patient prior to discharge.     Britt Varner PA-C         Scribe Attestation:   Scribe #1: I performed the above scribed service and the documentation accurately describes the services I performed. I attest to the accuracy of the note.    Attending Attestation:           Physician Attestation for Scribe:  Physician Attestation Statement for Scribe #1: I, Britt Varner PA-C,  reviewed documentation, as scribed by Dipti Carty in my presence, and it is both accurate and complete.                    Clinical Impression:   The primary encounter diagnosis was Contusion of left lower extremity, initial encounter. Diagnoses of Fall and Contusion of right upper extremity, initial encounter were also pertinent to this visit.      Disposition:   Disposition: Discharged  Condition: Stable                        Britt Varner PA-C  01/05/19 2006

## 2019-01-05 NOTE — DISCHARGE INSTRUCTIONS
Rest, apply ice intermittently to the leg and elevate the legs as much as possible.    You have been prescribed motrin for pain.    Follow-up with primary care.    Return to the ER for any concerns.

## 2019-01-24 DIAGNOSIS — Z12.31 ENCOUNTER FOR SCREENING MAMMOGRAM FOR MALIGNANT NEOPLASM OF BREAST: Primary | ICD-10-CM

## 2019-01-25 ENCOUNTER — OFFICE VISIT (OUTPATIENT)
Dept: OBSTETRICS AND GYNECOLOGY | Facility: CLINIC | Age: 44
End: 2019-01-25
Payer: MEDICAID

## 2019-01-25 VITALS — WEIGHT: 227.06 LBS | BODY MASS INDEX: 44.58 KG/M2 | HEIGHT: 60 IN

## 2019-01-25 DIAGNOSIS — R32 URINARY INCONTINENCE, UNSPECIFIED TYPE: ICD-10-CM

## 2019-01-25 DIAGNOSIS — Z01.419 WELL WOMAN EXAM: Primary | ICD-10-CM

## 2019-01-25 LAB
BACTERIA #/AREA URNS AUTO: NORMAL /HPF
BILIRUB UR QL STRIP: NEGATIVE
CLARITY UR REFRACT.AUTO: CLEAR
COLOR UR AUTO: YELLOW
GLUCOSE UR QL STRIP: NEGATIVE
HGB UR QL STRIP: NEGATIVE
KETONES UR QL STRIP: NEGATIVE
LEUKOCYTE ESTERASE UR QL STRIP: ABNORMAL
MICROSCOPIC COMMENT: NORMAL
NITRITE UR QL STRIP: NEGATIVE
PH UR STRIP: 8 [PH] (ref 5–8)
PROT UR QL STRIP: NEGATIVE
SP GR UR STRIP: 1.02 (ref 1–1.03)
SQUAMOUS #/AREA URNS AUTO: 4 /HPF
URN SPEC COLLECT METH UR: ABNORMAL
WBC #/AREA URNS AUTO: 1 /HPF (ref 0–5)

## 2019-01-25 PROCEDURE — 99999 PR PBB SHADOW E&M-EST. PATIENT-LVL III: ICD-10-PCS | Mod: PBBFAC,,, | Performed by: OBSTETRICS & GYNECOLOGY

## 2019-01-25 PROCEDURE — 87624 HPV HI-RISK TYP POOLED RSLT: CPT

## 2019-01-25 PROCEDURE — 99386 PREV VISIT NEW AGE 40-64: CPT | Mod: 25,S$PBB,, | Performed by: OBSTETRICS & GYNECOLOGY

## 2019-01-25 PROCEDURE — 87086 URINE CULTURE/COLONY COUNT: CPT

## 2019-01-25 PROCEDURE — 81001 URINALYSIS AUTO W/SCOPE: CPT

## 2019-01-25 PROCEDURE — 99213 OFFICE O/P EST LOW 20 MIN: CPT | Mod: PBBFAC | Performed by: OBSTETRICS & GYNECOLOGY

## 2019-01-25 PROCEDURE — 99999 PR PBB SHADOW E&M-EST. PATIENT-LVL III: CPT | Mod: PBBFAC,,, | Performed by: OBSTETRICS & GYNECOLOGY

## 2019-01-25 PROCEDURE — 87147 CULTURE TYPE IMMUNOLOGIC: CPT

## 2019-01-25 PROCEDURE — 88175 CYTOPATH C/V AUTO FLUID REDO: CPT

## 2019-01-25 PROCEDURE — 99386 PR PREVENTIVE VISIT,NEW,40-64: ICD-10-PCS | Mod: 25,S$PBB,, | Performed by: OBSTETRICS & GYNECOLOGY

## 2019-01-25 PROCEDURE — 87088 URINE BACTERIA CULTURE: CPT

## 2019-01-25 NOTE — PROGRESS NOTES
"CC: 42 yo here for AE    HPI: She is overall well today.  Has two children, s/p  x 2. Last pap smear was in 2014, NILM. Denies history of abnormal pap smears. She is due for mammogram. SA with male partner x 14 years. Cycles are regular, once per month. Not using contraception, but "they don't have sex like that." She has a history of PE. Walks about 2 miles two days per week. She is a  at a school. Weight is not great right now. Previously lost weight some years ago with diet/exercise. Non smoker. Recently had normal A1C. Has PCP. Complains of leaking urine, this has become worse since the increased weight.Feels like she has associated odor due to urine loss. Does endorse urinary urgency and leaking throughout the day. This is not necessarily when she coughs, sneezes, etc.    Past Medical History:   Diagnosis Date    CHF (congestive heart failure)     History of pulmonary embolism     Hypertension        Past Surgical History:   Procedure Laterality Date    Plate in right arm       VAGINAL DELIVERY         OB History      Para Term  AB Living    2 2            SAB TAB Ectopic Multiple Live Births                       Current Outpatient Medications on File Prior to Visit   Medication Sig Dispense Refill    metoprolol succinate (TOPROL-XL) 100 MG 24 hr tablet TAKE 1 TABLET(100 MG) BY MOUTH DAILY 90 tablet 1    cetirizine (ZYRTEC) 10 MG tablet Take 1 tablet (10 mg total) by mouth once daily. 30 tablet 2    escitalopram oxalate (LEXAPRO) 5 MG Tab Take 1 tablet (5 mg total) by mouth once daily. 30 tablet 0    ibuprofen (ADVIL,MOTRIN) 600 MG tablet Take 1 tablet (600 mg total) by mouth every 6 (six) hours as needed for Pain. 15 tablet 0    loratadine (CLARITIN) 10 mg tablet Take 10 mg by mouth once daily.      meloxicam (MOBIC) 15 MG tablet Take 1 tablet (15 mg total) by mouth once daily. 30 tablet 1     No current facility-administered medications on file prior to visit.  "         ROS:  GENERAL: Feeling well overall.   SKIN: Occasional folliculitis of the vulva   HEAD: Denies head injury or headache.   NODES: Denies enlarged lymph nodes.   CHEST: Denies chest pain or shortness of breath.   CARDIOVASCULAR: Denies palpitations or left sided chest pain.   ABDOMEN: No abdominal pain, .   URINARY: Urinary urgency present, no dysuria, hematuria   REPRODUCTIVE: See HPI.    HEMATOLOGIC: No easy bruisability or excessive bleeding.   MUSCULOSKELETAL: Denies joint pain or swelling.   NEUROLOGIC: Denies syncope or weakness.   PSYCHIATRIC: Denies depression, anxiety or mood swings.    Physical Exam:   Ht 5' (1.524 m)   Wt 103 kg (227 lb 1.2 oz)   LMP 2018   BMI 44.35 kg/m²   General: No distress, well appearing, morbidly obese   Heart: Regular rate  Lungs: No increased work of breathing  Breasts: Symmetrical, no masses, discharge or skin retractions, o.  Abdomen: soft, nontender, no masses, obese   MS: lower extremeties symmetrical, no edema  Pelvic Exam:     GENITALIA: Normal external genitalia, no lesions   URETHRA: normal appearing   Bladder non tender   VAGINA: normal vaginal mucosa, no lesions   CERVIX: several small nabothian cysts anteriorly, there is also an area of anterior cervix that is more prominent but no abnormal vasculature, lesions, etc    UTERUS: small, mobile, non tender although difficult to feel secondary to body habitus    ADNEXA: no adnexal masses, tenderness or fullness      ASSESSMENT/PLAN: 44 yo  here for WWE.     WWE  -- Pap due , cytology and HPV collected and sent. If normal, can repeat in 5 years.  -- Offered contraception and she declines  -- mammogram due and ordered  -- Colonoscopy screening at age 47    Obesity  -- Lengthy discussion about importance of weight loss. We reviewed healthy diet and exercise recommendations. Encouraged small weight loss goals - ie 10-15 pounds over next 3 months. Recommended 30 minutes of exercise at least 5 times per  week. She will increase her walking to 4-5 times per week.     Urinary incontinence   -- Clinical history not c/w CARMINE.   -- We reviewed importance of weight loss. See counseling above.  -- UA with culture    -- She is interested in referral to urogyencology, will place    Ginny Wallace MD  Obstetrics and Gynecology  [facility]

## 2019-01-27 LAB — BACTERIA UR CULT: NORMAL

## 2019-01-28 ENCOUNTER — TELEPHONE (OUTPATIENT)
Dept: OBSTETRICS AND GYNECOLOGY | Facility: CLINIC | Age: 44
End: 2019-01-28

## 2019-01-28 ENCOUNTER — TELEPHONE (OUTPATIENT)
Dept: UROGYNECOLOGY | Facility: CLINIC | Age: 44
End: 2019-01-28

## 2019-01-28 RX ORDER — AMPICILLIN 500 MG/1
500 CAPSULE ORAL EVERY 6 HOURS
Qty: 20 CAPSULE | Refills: 0 | Status: SHIPPED | OUTPATIENT
Start: 2019-01-28 | End: 2019-01-29

## 2019-01-28 NOTE — TELEPHONE ENCOUNTER
Floyd López,    Can you please contact pt and help her schedule a consult appointment with Dr. Segovia or Dr. Rain for urinary incontinence referred by Ginny Wallace.    Thank you,  Fei

## 2019-01-28 NOTE — TELEPHONE ENCOUNTER
----- Message from Susan Malone sent at 1/28/2019  3:04 PM CST -----  Contact: EZE DUENAS [[id number]]  Name of Who is Calling: EZE DUENAS [[id number]]      What is the request in detail: Patient would like a call back regarding medication ampicillin (PRINCIPEN) 500 MG capsule. Patient states she is unable to swallow the gel capsules and she would like an alternative medication. Please call       Can the clinic reply by [facility]: no    What Number to Call Back if not in [facility]: [phone]

## 2019-01-28 NOTE — TELEPHONE ENCOUNTER
Spoke with patient, she is unable to swallow medication sent to pharmacy and would like an alternative. Msg routed to  for advice.

## 2019-01-29 RX ORDER — CEPHALEXIN 250 MG/5ML
500 POWDER, FOR SUSPENSION ORAL EVERY 8 HOURS
Qty: 150 ML | Refills: 0 | Status: SHIPPED | OUTPATIENT
Start: 2019-01-29 | End: 2019-02-03

## 2019-01-30 LAB
HPV HR 12 DNA CVX QL NAA+PROBE: NEGATIVE
HPV16 AG SPEC QL: NEGATIVE
HPV18 DNA SPEC QL NAA+PROBE: NEGATIVE

## 2019-02-04 ENCOUNTER — TELEPHONE (OUTPATIENT)
Dept: OBSTETRICS AND GYNECOLOGY | Facility: CLINIC | Age: 44
End: 2019-02-04

## 2019-02-04 NOTE — TELEPHONE ENCOUNTER
----- Message from Ginny Wallace MD sent at 2/3/2019  8:59 AM CST -----  Do you mind letting her know that pap smear test was normal and HPV was negative. We can repeat her pap smear in 5 years, but still recommend an annual check up. Thanks! I will send my chart message as well, but I think she prefers a phone call. Thank you!

## 2019-02-07 ENCOUNTER — TELEPHONE (OUTPATIENT)
Dept: INTERNAL MEDICINE | Facility: CLINIC | Age: 44
End: 2019-02-07

## 2019-06-20 DIAGNOSIS — I10 ESSENTIAL HYPERTENSION: ICD-10-CM

## 2019-06-20 RX ORDER — METOPROLOL SUCCINATE 100 MG/1
TABLET, EXTENDED RELEASE ORAL
Qty: 90 TABLET | Refills: 0 | Status: SHIPPED | OUTPATIENT
Start: 2019-06-20 | End: 2019-09-20 | Stop reason: SDUPTHER

## 2019-06-29 ENCOUNTER — HOSPITAL ENCOUNTER (EMERGENCY)
Facility: HOSPITAL | Age: 44
Discharge: HOME | End: 2019-06-29
Attending: EMERGENCY MEDICINE
Payer: MEDICAID

## 2019-06-29 VITALS
RESPIRATION RATE: 16 BRPM | SYSTOLIC BLOOD PRESSURE: 168 MMHG | HEART RATE: 79 BPM | TEMPERATURE: 99 F | WEIGHT: 220 LBS | HEIGHT: 60 IN | OXYGEN SATURATION: 100 % | BODY MASS INDEX: 43.19 KG/M2 | DIASTOLIC BLOOD PRESSURE: 79 MMHG

## 2019-06-29 DIAGNOSIS — M54.9 BACK PAIN: ICD-10-CM

## 2019-06-29 DIAGNOSIS — R52 PAIN: ICD-10-CM

## 2019-06-29 LAB
ALBUMIN SERPL BCP-MCNC: 3.4 G/DL (ref 3.5–5.2)
ALP SERPL-CCNC: 70 U/L (ref 55–135)
ALT SERPL W/O P-5'-P-CCNC: 7 U/L (ref 10–44)
ANION GAP SERPL CALC-SCNC: 9 MMOL/L (ref 8–16)
AST SERPL-CCNC: 14 U/L (ref 10–40)
B-HCG UR QL: NEGATIVE
BASOPHILS # BLD AUTO: 0.02 K/UL (ref 0–0.2)
BASOPHILS NFR BLD: 0.2 % (ref 0–1.9)
BILIRUB SERPL-MCNC: 0.5 MG/DL (ref 0.1–1)
BUN SERPL-MCNC: 7 MG/DL (ref 6–20)
CALCIUM SERPL-MCNC: 9.5 MG/DL (ref 8.7–10.5)
CHLORIDE SERPL-SCNC: 106 MMOL/L (ref 95–110)
CO2 SERPL-SCNC: 24 MMOL/L (ref 23–29)
CREAT SERPL-MCNC: 0.8 MG/DL (ref 0.5–1.4)
CTP QC/QA: YES
DIFFERENTIAL METHOD: ABNORMAL
EOSINOPHIL # BLD AUTO: 0.1 K/UL (ref 0–0.5)
EOSINOPHIL NFR BLD: 0.9 % (ref 0–8)
ERYTHROCYTE [DISTWIDTH] IN BLOOD BY AUTOMATED COUNT: 17.6 % (ref 11.5–14.5)
EST. GFR  (AFRICAN AMERICAN): >60 ML/MIN/1.73 M^2
EST. GFR  (NON AFRICAN AMERICAN): >60 ML/MIN/1.73 M^2
GLUCOSE SERPL-MCNC: 82 MG/DL (ref 70–110)
HCT VFR BLD AUTO: 33.1 % (ref 37–48.5)
HGB BLD-MCNC: 10.3 G/DL (ref 12–16)
LYMPHOCYTES # BLD AUTO: 1.3 K/UL (ref 1–4.8)
LYMPHOCYTES NFR BLD: 15.3 % (ref 18–48)
MCH RBC QN AUTO: 21.8 PG (ref 27–31)
MCHC RBC AUTO-ENTMCNC: 31.1 G/DL (ref 32–36)
MCV RBC AUTO: 70 FL (ref 82–98)
MONOCYTES # BLD AUTO: 0.6 K/UL (ref 0.3–1)
MONOCYTES NFR BLD: 6.7 % (ref 4–15)
NEUTROPHILS # BLD AUTO: 6.5 K/UL (ref 1.8–7.7)
NEUTROPHILS NFR BLD: 77.1 % (ref 38–73)
PLATELET # BLD AUTO: 357 K/UL (ref 150–350)
PMV BLD AUTO: 9.4 FL (ref 9.2–12.9)
POTASSIUM SERPL-SCNC: 4.1 MMOL/L (ref 3.5–5.1)
PROT SERPL-MCNC: 8.1 G/DL (ref 6–8.4)
RBC # BLD AUTO: 4.72 M/UL (ref 4–5.4)
SODIUM SERPL-SCNC: 139 MMOL/L (ref 136–145)
WBC # BLD AUTO: 8.49 K/UL (ref 3.9–12.7)

## 2019-06-29 PROCEDURE — 85025 COMPLETE CBC W/AUTO DIFF WBC: CPT

## 2019-06-29 PROCEDURE — 93010 ELECTROCARDIOGRAM REPORT: CPT | Mod: ,,, | Performed by: INTERNAL MEDICINE

## 2019-06-29 PROCEDURE — 80053 COMPREHEN METABOLIC PANEL: CPT

## 2019-06-29 PROCEDURE — 25000003 PHARM REV CODE 250: Performed by: EMERGENCY MEDICINE

## 2019-06-29 PROCEDURE — 63600175 PHARM REV CODE 636 W HCPCS: Performed by: EMERGENCY MEDICINE

## 2019-06-29 PROCEDURE — 93005 ELECTROCARDIOGRAM TRACING: CPT

## 2019-06-29 PROCEDURE — 99285 EMERGENCY DEPT VISIT HI MDM: CPT | Mod: 25

## 2019-06-29 PROCEDURE — 93010 EKG 12-LEAD: ICD-10-PCS | Mod: ,,, | Performed by: INTERNAL MEDICINE

## 2019-06-29 PROCEDURE — 81025 URINE PREGNANCY TEST: CPT | Performed by: EMERGENCY MEDICINE

## 2019-06-29 PROCEDURE — 96374 THER/PROPH/DIAG INJ IV PUSH: CPT

## 2019-06-29 PROCEDURE — 25500020 PHARM REV CODE 255: Performed by: EMERGENCY MEDICINE

## 2019-06-29 RX ORDER — CYCLOBENZAPRINE HCL 10 MG
10 TABLET ORAL 3 TIMES DAILY PRN
Qty: 15 TABLET | Refills: 0 | Status: SHIPPED | OUTPATIENT
Start: 2019-06-29 | End: 2019-07-04

## 2019-06-29 RX ORDER — KETOROLAC TROMETHAMINE 30 MG/ML
30 INJECTION, SOLUTION INTRAMUSCULAR; INTRAVENOUS
Status: COMPLETED | OUTPATIENT
Start: 2019-06-29 | End: 2019-06-29

## 2019-06-29 RX ORDER — IBUPROFEN 600 MG/1
600 TABLET ORAL EVERY 6 HOURS PRN
Qty: 20 TABLET | Refills: 0 | Status: SHIPPED | OUTPATIENT
Start: 2019-06-29 | End: 2019-12-10 | Stop reason: CLARIF

## 2019-06-29 RX ORDER — CYCLOBENZAPRINE HCL 10 MG
10 TABLET ORAL
Status: COMPLETED | OUTPATIENT
Start: 2019-06-29 | End: 2019-06-29

## 2019-06-29 RX ADMIN — IOHEXOL 80 ML: 350 INJECTION, SOLUTION INTRAVENOUS at 05:06

## 2019-06-29 RX ADMIN — CYCLOBENZAPRINE HYDROCHLORIDE 10 MG: 10 TABLET, FILM COATED ORAL at 06:06

## 2019-06-29 RX ADMIN — KETOROLAC TROMETHAMINE 30 MG: 30 INJECTION, SOLUTION INTRAMUSCULAR; INTRAVENOUS at 06:06

## 2019-06-29 NOTE — ED TRIAGE NOTES
Pt presents to the ED with c/o mid back pain that began earlier today. Also states she does have a PMHx of a PE back in 2005 and was taking blood thinners at the time but not taking them now. Denies SOB and chest pain. States whenever she takes a deep breath in that it aggravates the back pain. In NAD at this time.

## 2019-06-29 NOTE — ED PROVIDER NOTES
Encounter Date: 6/29/2019    SCRIBE #1 NOTE: I, Mando Ward, am scribing for, and in the presence of, Froy Garcia MD. Other sections scribed: HPI, ROS, PE.       History     Chief Complaint   Patient presents with    Back Pain     right upper back pain x1 day, hurts when breath in. hx of PE 2005. states feels like that     Pt is a 42 y/o female with a PMHx of CHF, PE, and HTN presenting with c/o right upper back pain that began yesterday 6/28/19. The pt also notes pleuritic pain on inhale.  Pain rates 9/10 during triage. Denies any other modifying factors. Denies any recent trauma, fever, chills, diarrhea or abd pain. This pt is allergic to Lisinopril. The pt does not smoke. Denies any drug use. Drinks alcohol occassionally. She has hardware in her left arm.    The history is provided by the patient and medical records.     Review of patient's allergies indicates:   Allergen Reactions    Lisinopril Other (See Comments)     Angioedema     Past Medical History:   Diagnosis Date    CHF (congestive heart failure)     History of pulmonary embolism 2005    Hypertension      Past Surgical History:   Procedure Laterality Date    Plate in right arm       VAGINAL DELIVERY       Family History   Problem Relation Age of Onset    Cancer Maternal Grandmother     Breast cancer Neg Hx     Ovarian cancer Neg Hx      Social History     Tobacco Use    Smoking status: Never Smoker    Smokeless tobacco: Never Used   Substance Use Topics    Alcohol use: Never     Frequency: Never     Comment: Social     Drug use: No     Review of Systems   Constitutional: Negative for chills and fever.   HENT: Negative for rhinorrhea.    Eyes: Negative for pain.   Respiratory: Negative for cough and shortness of breath.    Cardiovascular: Negative for leg swelling.   Gastrointestinal: Negative for abdominal pain and diarrhea.   Endocrine: Negative for polyuria.   Genitourinary: Negative for dysuria.   Musculoskeletal: Positive for back  pain.   Skin: Negative for color change.   Neurological: Negative for dizziness and headaches.   Hematological: Negative for adenopathy.   Psychiatric/Behavioral: Negative for agitation and confusion.   All other systems reviewed and are negative.      Physical Exam     Initial Vitals [06/29/19 1419]   BP Pulse Resp Temp SpO2   (!) 177/102 98 18 98.2 °F (36.8 °C) 100 %      MAP       --         Physical Exam    Nursing note and vitals reviewed.  Constitutional: She appears well-developed and well-nourished. She is not diaphoretic. No distress.   HENT:   Head: Normocephalic and atraumatic.   Mouth/Throat: Oropharynx is clear and moist.   Eyes: EOM are normal. Pupils are equal, round, and reactive to light. No scleral icterus.   Neck: Normal range of motion. Neck supple. No JVD present.   Cardiovascular: Normal rate, regular rhythm, normal heart sounds and intact distal pulses.   Pulmonary/Chest: Effort normal and breath sounds normal. No stridor.   Abdominal: Soft. Bowel sounds are normal. She exhibits no distension. There is no tenderness.   Musculoskeletal: Normal range of motion. She exhibits no edema.   Tenderness on the right upper paraspinal back    No rashes or lesions   Neurological: She is alert and oriented to person, place, and time. She has normal strength. No cranial nerve deficit or sensory deficit.   Skin: Skin is warm and dry.   Psychiatric: She has a normal mood and affect. Thought content normal.         ED Course   Procedures  Labs Reviewed   CBC W/ AUTO DIFFERENTIAL - Abnormal; Notable for the following components:       Result Value    Hemoglobin 10.3 (*)     Hematocrit 33.1 (*)     Mean Corpuscular Volume 70 (*)     Mean Corpuscular Hemoglobin 21.8 (*)     Mean Corpuscular Hemoglobin Conc 31.1 (*)     RDW 17.6 (*)     Platelets 357 (*)     Gran% 77.1 (*)     Lymph% 15.3 (*)     All other components within normal limits   COMPREHENSIVE METABOLIC PANEL - Abnormal; Notable for the following  components:    Albumin 3.4 (*)     ALT 7 (*)     All other components within normal limits   POCT URINE PREGNANCY     EKG Readings: (Independently Interpreted)   EKG done at 2:37 p.m. showed normal sinus rhythm rate of 94.  No ST elevation or major T-wave abnormalities.  Normal axis QRS.  Nonspecific EKG.  Fairly unremarkable.     ECG Results          EKG 12-lead (In process)  Result time 06/29/19 17:03:08    In process by Interface, Lab In [facility] (06/29/19 17:03:08)                 Narrative:    Test Reason : M54.9,    Vent. Rate : 094 BPM     Atrial Rate : 094 BPM     P-R Int : 146 ms          QRS Dur : 088 ms      QT Int : 374 ms       P-R-T Axes : 046 039 000 degrees     QTc Int : 467 ms    Normal sinus rhythm  Nonspecific ST abnormality  Abnormal ECG  When compared with ECG of 09-DEC-2015 08:42,  No significant change was found    Referred By: [email or web address]   SELF           Confirmed By:                   In process by Interface, Lab In [facility] (06/29/19 16:48:52)                 Narrative:    Test Reason : M54.9,    Vent. Rate : 094 BPM     Atrial Rate : 094 BPM     P-R Int : 146 ms          QRS Dur : 088 ms      QT Int : 374 ms       P-R-T Axes : 046 039 000 degrees     QTc Int : 467 ms    Normal sinus rhythm  Nonspecific ST abnormality  Abnormal ECG  When compared with ECG of 09-DEC-2015 08:42,  No significant change was found    Referred By: [email or web address]   SELF           Confirmed By:                             Imaging Results          US Abdomen Limited (Gallbladder) (Final result)  Result time 06/29/19 19:58:53    Final result by Juan M Gonzalez MD (06/29/19 19:58:53)                 Impression:      Mild nonspecific gallbladder wall thickening.  No cholelithiasis or other convincing findings of acute cholecystitis.      Electronically signed by: Juan M Gonzalez MD  Date:    06/29/2019  Time:    19:58             Narrative:    EXAMINATION:  US ABDOMEN LIMITED    CLINICAL HISTORY:  Pain,  unspecified    TECHNIQUE:  Limited ultrasound of the right upper quadrant of the abdomen (including pancreas, liver, gallbladder, and common bile duct) was performed.    COMPARISON:  CTA chest, 06/29/2019.    FINDINGS:  Liver: Liver does not appear to be enlarged.  Homogeneous echotexture. No focal hepatic lesions.    Gallbladder: Gallbladder is mildly distended.  No calculi are identified.  There is mild gallbladder wall thickening, measuring up to 4 mm in diameter.  No pericholecystic fluid.  Sonographic Sandoval sign is negative.  No gallbladder wall hypervascularity.    Biliary system: The common duct is not dilated, measuring 1 mm.  No intrahepatic ductal dilatation.    Miscellaneous: Pancreas is obscured by overlying bowel gas.  Limited visualization of the right kidney is unremarkable.                                CTA Chest Non-Coronary - PE Study (Final result)  Result time 06/29/19 18:32:59    Final result by Ivan Bourgeois MD (06/29/19 18:32:59)                 Impression:      No evidence of central pulmonary embolism.  Distal and subsegmental pulmonary tree are poorly visualized.  Correlation with DVT study may be obtained for further evaluation.    5 mm nodule in the right lower lobe.  For a solid nodule <6 mm, Fleischner Society 2017 guidelines recommend no routine follow up for a low risk patient, or follow-up with non-contrast chest CT at 12 months in a high risk patient.    Questionable wall thickening of the gallbladder.  Right upper quadrant ultrasound may be obtained for further evaluation.    This report was flagged in Epic as abnormal.      Electronically signed by: Ivan Bourgeois MD  Date:    06/29/2019  Time:    18:32             Narrative:    EXAMINATION:  CTA CHEST NON CORONARY    CLINICAL HISTORY:  back pain;    TECHNIQUE:  Low dose axial images, sagittal and coronal reformations were obtained from the thoracic inlet to the lung bases following the IV administration of 80. mL of Omnipaque 350.   Contrast timing was optimized to evaluate the pulmonary arteries.  MIP images were performed.  There are some motion limitations to the examination.    COMPARISON:  Chest x-ray dated 06/29/2019.    FINDINGS:  CT pulmonary embolism examination:    There are no filling defects within the central pulmonary tree to suggest pulmonary embolism.  The distal and subsegmental pulmonary tree are poorly visualized.  The pulmonary trunk is nondilated.    CT chest examination:    The thyroid gland is unremarkable.  The base of the neck is within normal limits.  The supraclavicular regions are unremarkable.    The trachea is within normal limits.  The central airways are unremarkable.  There is no evidence of bronchiectasis.  No endobronchial lesion is identified.    The heart is unremarkable.  There are no pericardial effusions.  There is no evidence of intracardiac thrombus.  No coronary artery calcifications are present.    The thoracic aorta is within normal limits.  There is normal tapering of the thoracic aorta.  The great vessels arising from the aortic arch are within normal limits.    There is no evidence of lymphadenopathy in the chest.  The axillary regions are within normal limits.    There are no pleural effusions.  There is no evidence of a pneumothorax.  There is no evidence of pneumomediastinum.  No airspace opacity is present.  Evaluation for discrete pulmonary nodules is difficult given motion limitations.  There is a 5 solid mm nodule in the right lower lobe.    There is questionable wall thickening of the gallbladder.  The esophagus, stomach, and visualized upper abdominal structures are within normal limits.  There is no evidence of free air in the upper abdomen.    The chest wall is unremarkable.  The osseous structures are unremarkable.                               X-Ray Chest PA And Lateral (Final result)  Result time 06/29/19 15:02:53    Final result by Ivan Bourgeois MD (06/29/19 15:02:53)                  Impression:      No acute process.      Electronically signed by: Ivan Bourgeois MD  Date:    06/29/2019  Time:    15:02             Narrative:    EXAMINATION:  XR CHEST PA AND LATERAL    CLINICAL HISTORY:  Dorsalgia, unspecified    TECHNIQUE:  PA and lateral views of the chest were performed.    COMPARISON:  12/09/2015.    FINDINGS:  Monitoring EKG leads are present.  The partially visualized postoperative changes of the right humerus.    The trachea is unremarkable.  The cardiomediastinal silhouette is within normal limits.  The hilar structures are unremarkable.  The hemidiaphragms are within normal limits.  There is no evidence of free air beneath the hemidiaphragms.  There are no pleural effusions.  There is no evidence of a pneumothorax.  No airspace opacity is present.                                 Medical Decision Making:   History:   Old Medical Records: I decided to obtain old medical records.  Initial Assessment:   43-year-old female coming in secondary to upper right back pain. On exam this is more consistent with muscle spasm.  However pneumonia, PE also considered on the patient.  No urinary complaints.  Vitals reassuring.  No fevers or chills.  Does feel like muscle spasms.  No right upper quadrant tenderness in her abdomen.    Labs reassuring.  IM ketorolac and Flexeril for the patient.  CT showed some thickening of the gallbladder.  She does not have a white count and chemistry is reassuring.  Getting ultrasound.  Patient's pain is much improved.    Ultrasound just showed mild gallbladder wall thickening.  No right upper quadrant tenderness. No pericholecystic fluid.  Additionally is only 4 mm which is actually on the high end of normal is post abnormal.  I am given patient a general surgeon to follow up with the she has any abdominal pain, nausea, vomiting, worsening symptoms. Primary care follow-up.  Discharged with ibuprofen cyclobenzaprine.  Not to drive on Flexeril I discussed with the  patient the diagnosis, treatment plan, indications for return to the emergency department, and for expected follow-up. The patient verbalized an understanding. The patient is asked if there are any questions or concerns. We discuss the case, until all issues are addressed to the patients satisfaction. Patient understands and is agreeable to the plan.   Froy Garcia    Clinical Tests:   Radiological Study: Ordered and Reviewed              Attending Attestation:           Physician Attestation for Scribe:  Physician Attestation Statement for Scribe #1: I, reviewed documentation, as scribed by Mando Ward in my presence, and it is both accurate and complete.                    Clinical Impression:       ICD-10-CM ICD-9-CM   1. Back pain M54.9 724.5   2. Back pain M54.9 724.5   3. Pain R52 780.96            I, Froy Mejias, personally performed the services described in this documentation. All medical record entries made by the scribe were at my direction and in my presence.  I have reviewed the chart and agree that the record reflects my personal performance and is accurate and complete                     Froy Garcia MD  06/29/19 2022

## 2019-06-30 NOTE — DISCHARGE INSTRUCTIONS
If any nausea or vomiting or abdominal pain please follow up with the general surgeon that I gave you.

## 2019-07-05 ENCOUNTER — PATIENT OUTREACH (OUTPATIENT)
Dept: ADMINISTRATIVE | Facility: OTHER | Age: 44
End: 2019-07-05

## 2019-07-10 ENCOUNTER — TELEPHONE (OUTPATIENT)
Dept: UROGYNECOLOGY | Facility: CLINIC | Age: 44
End: 2019-07-10
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
## 2019-08-17 ENCOUNTER — TELEPHONE (OUTPATIENT)
Dept: FAMILY MEDICINE | Facility: CLINIC | Age: 44
End: 2019-08-17

## 2019-08-17 NOTE — TELEPHONE ENCOUNTER
Please offer the patient an appointment with Dr. Britton. If she feels like she needs to be seen sooner, please offer her an appointment with an available provider. Thank you.

## 2019-08-17 NOTE — TELEPHONE ENCOUNTER
----- Message from Monica Mathew sent at 8/16/2019  6:06 PM CDT -----  Contact: patient  Patient called to schedule an appointment specifically with Dr Britton  And wishes to speak with a nurse regarding this matter.      she  can be reached at [phone]    Thanks  KB

## 2019-09-20 DIAGNOSIS — I10 ESSENTIAL HYPERTENSION: ICD-10-CM

## 2019-09-20 RX ORDER — METOPROLOL SUCCINATE 100 MG/1
TABLET, EXTENDED RELEASE ORAL
Qty: 90 TABLET | Refills: 0 | Status: SHIPPED | OUTPATIENT
Start: 2019-09-20 | End: 2020-01-20

## 2019-12-10 ENCOUNTER — HOSPITAL ENCOUNTER (EMERGENCY)
Facility: HOSPITAL | Age: 44
Discharge: HOME | End: 2019-12-10
Attending: EMERGENCY MEDICINE
Payer: MEDICAID

## 2019-12-10 ENCOUNTER — TELEPHONE (OUTPATIENT)
Dept: FAMILY MEDICINE | Facility: CLINIC | Age: 44
End: 2019-12-10

## 2019-12-10 VITALS
RESPIRATION RATE: 20 BRPM | DIASTOLIC BLOOD PRESSURE: 71 MMHG | HEIGHT: 60 IN | OXYGEN SATURATION: 98 % | HEART RATE: 88 BPM | SYSTOLIC BLOOD PRESSURE: 150 MMHG | TEMPERATURE: 99 F | WEIGHT: 220 LBS | BODY MASS INDEX: 43.19 KG/M2

## 2019-12-10 DIAGNOSIS — M67.819 TENDINOSIS OF ROTATOR CUFF: Primary | ICD-10-CM

## 2019-12-10 DIAGNOSIS — M25.512 LEFT SHOULDER PAIN: ICD-10-CM

## 2019-12-10 LAB
B-HCG UR QL: NEGATIVE
CTP QC/QA: YES

## 2019-12-10 PROCEDURE — 25000003 PHARM REV CODE 250: Performed by: PHYSICIAN ASSISTANT

## 2019-12-10 PROCEDURE — 96372 THER/PROPH/DIAG INJ SC/IM: CPT

## 2019-12-10 PROCEDURE — 63600175 PHARM REV CODE 636 W HCPCS: Performed by: PHYSICIAN ASSISTANT

## 2019-12-10 PROCEDURE — 81025 URINE PREGNANCY TEST: CPT | Performed by: PHYSICIAN ASSISTANT

## 2019-12-10 PROCEDURE — 99284 EMERGENCY DEPT VISIT MOD MDM: CPT | Mod: 25

## 2019-12-10 RX ORDER — LIDOCAINE 50 MG/G
1 PATCH TOPICAL
Status: DISCONTINUED | OUTPATIENT
Start: 2019-12-10 | End: 2019-12-10 | Stop reason: HOSPADM

## 2019-12-10 RX ORDER — NAPROXEN 500 MG/1
500 TABLET ORAL 2 TIMES DAILY WITH MEALS
Qty: 14 TABLET | Refills: 0 | Status: SHIPPED | OUTPATIENT
Start: 2019-12-10 | End: 2019-12-17

## 2019-12-10 RX ORDER — KETOROLAC TROMETHAMINE 30 MG/ML
15 INJECTION, SOLUTION INTRAMUSCULAR; INTRAVENOUS
Status: COMPLETED | OUTPATIENT
Start: 2019-12-10 | End: 2019-12-10

## 2019-12-10 RX ORDER — TRAMADOL HYDROCHLORIDE 50 MG/1
50 TABLET ORAL EVERY 6 HOURS PRN
Qty: 9 TABLET | Refills: 0 | Status: SHIPPED | OUTPATIENT
Start: 2019-12-10 | End: 2020-02-05

## 2019-12-10 RX ORDER — NAPROXEN 500 MG/1
500 TABLET ORAL 2 TIMES DAILY WITH MEALS
Qty: 14 TABLET | Refills: 0 | Status: SHIPPED | OUTPATIENT
Start: 2019-12-10 | End: 2019-12-10 | Stop reason: SDUPTHER

## 2019-12-10 RX ADMIN — LIDOCAINE 1 PATCH: 50 PATCH TOPICAL at 12:12

## 2019-12-10 RX ADMIN — KETOROLAC TROMETHAMINE 15 MG: 30 INJECTION, SOLUTION INTRAMUSCULAR; INTRAVENOUS at 12:12

## 2019-12-10 NOTE — TELEPHONE ENCOUNTER
Would recommend clinic evaluation since not seen for this problem previously - in the meantime, if not tried can use extra strength tylenol (500-1000 mg up to three times a day) until scheduled clinic appointment. Can also try wait list if needs sooner evaluation than the 17th

## 2019-12-10 NOTE — DISCHARGE INSTRUCTIONS
Please take new medication as directed. Please make sure to follow up with Orthopedics using resources provided to discuss today's Emergency Department visit and for further evaluation and management. Please return to the Emergency Department if your symptoms worsen or you develop any additional concerning symptoms.

## 2019-12-10 NOTE — TELEPHONE ENCOUNTER
Spoke with pt. Regarding her message. Pt. Reports arthritis in her shoulders. Was unable to get an appt. With you , had to schedule it with . Please advise last office visit with you was 12/14/18

## 2019-12-10 NOTE — ED PROVIDER NOTES
"Encounter Date: 12/10/2019    SCRIBE #1 NOTE: I, Ginny Ward, am scribing for, and in the presence of,  Luis Carlos Carreon PA-C. I have scribed the following portions of the note - Other sections scribed: HPI, ROS and PE.       History     Chief Complaint   Patient presents with    Shoulder Pain     left shoulder pain for a week, hurts to move, she cant lift it, h/o arthritis     CC: Shoulder Pain    HPI: This 44 y.o female, with a medical history of congestive heart failure, pulmonary embolism, and hypertension, presents to the ED c/o acute, severe (10/10) left shoulder pain. Pt states that she initially began to experience pain to the right shoulder a couple of days ago, however, she reports that it later radiated into the back and is now present to the left shoulder. She notes that the shoulder feels "heavy", but states that she only experiences pain with movement of the left arm. Pt reports taking Motrin as well as Tylenol (last taken at 4:00 am this morning) for treatment without any relief. Additionally, she notes that she typically experiences shoulder issues (greatest to left shoulder) as she has a history of arthritis. Pt is not followed by orthopedics. No recent trauma, falls or accidents. No other associated symptoms.    The history is provided by the patient.     Review of patient's allergies indicates:   Allergen Reactions    Lisinopril Other (See Comments)     Angioedema     Past Medical History:   Diagnosis Date    CHF (congestive heart failure)     History of pulmonary embolism 2005    Hypertension      Past Surgical History:   Procedure Laterality Date    FRACTURE SURGERY      Plate in right arm       VAGINAL DELIVERY       Family History   Problem Relation Age of Onset    Cancer Maternal Grandmother     Breast cancer Neg Hx     Ovarian cancer Neg Hx      Social History     Tobacco Use    Smoking status: Never Smoker    Smokeless tobacco: Never Used   Substance Use Topics    Alcohol " use: Never     Frequency: Never     Comment: Social     Drug use: No     Review of Systems   Constitutional: Negative for fever.   HENT: Negative for sore throat.    Respiratory: Negative for shortness of breath.    Cardiovascular: Negative for chest pain.   Gastrointestinal: Negative for nausea.   Genitourinary: Negative for dysuria.   Musculoskeletal: Positive for arthralgias (left shoulder pain). Negative for back pain.   Skin: Negative for rash.   Neurological: Negative for weakness.       Physical Exam     Initial Vitals [12/10/19 1116]   BP Pulse Resp Temp SpO2   (!) 177/99 89 18 98.7 °F (37.1 °C) 97 %      MAP       --         Physical Exam    Nursing note and vitals reviewed.  Constitutional: She appears well-developed and well-nourished. No distress.   HENT:   Head: Normocephalic and atraumatic.   Nose: Nose normal.   Mouth/Throat: Oropharynx is clear and moist.   Eyes: EOM are normal. Pupils are equal, round, and reactive to light.   Neck: Normal range of motion. Neck supple.   Cardiovascular: Normal rate, regular rhythm, normal heart sounds and intact distal pulses.   Pulmonary/Chest: Effort normal and breath sounds normal. No respiratory distress. She has no wheezes. She has no rhonchi. She has no rales.   Abdominal: Soft. Bowel sounds are normal. There is no tenderness.   Musculoskeletal:        Right shoulder: She exhibits tenderness and pain. She exhibits no bony tenderness, no swelling, no deformity and normal pulse.   There is tenderness to palpation to the left AC joint. No erythema, bruising, swelling or gross deformities noted.  No warmth to the joint palpated. Distal pulses and sensation intact.    Neurological: She is alert and oriented to person, place, and time. She has normal strength. No cranial nerve deficit or sensory deficit.   Skin: Skin is warm and dry.   Psychiatric: She has a normal mood and affect.         ED Course   Procedures  Labs Reviewed   POCT URINE PREGNANCY           Imaging Results          X-Ray Shoulder Trauma Left (Final result)  Result time 12/10/19 12:18:25    Final result by Steven Mckeon MD (12/10/19 12:18:25)                 Impression:      1. Well corticated ossific focus along the superolateral aspect of the left humeral head suggesting that of calcific tendinosis, small avulsion injury is not excluded.  Additionally, there is some irregularity in the region of the greater tubercle.  Nondisplaced fracture would be difficult to definitively exclude in this setting of trauma, correlation with any focal tenderness is recommended.  No dislocation.      Electronically signed by: Steven Mckeon MD  Date:    12/10/2019  Time:    12:18             Narrative:    EXAMINATION:  XR SHOULDER TRAUMA 3 VIEW LEFT    CLINICAL HISTORY:  Pain in left shoulder    TECHNIQUE:  Three views of the left shoulder were performed.    COMPARISON  None    FINDINGS:  Three views left shoulder.    No glenohumeral dislocation.  There is a well corticated ossific fragment along the anterolateral aspect of the left humeral head, noting there is some cortical irregularity in the region.  Degenerative changes are noted of the acromioclavicular joint.  The visualized ribs are grossly intact.  The visualized left lung zones are grossly clear.                                 Medical Decision Making:   ED Management:  Hemodynamically stable. Non-toxic and in no acute distress. Overall well appearing, pleasant, conversational. X-ray read reports well corticated ossific focus along the superolateral aspect of the left humeral head suggesting that of calcific tendinosis. Will treat with NSAIDs, d/c home with Orthopedic f/u. Pt verbalizes understanding and is agreeable with plan. Return instructions given.             Scribe Attestation:   Scribe #1: I performed the above scribed service and the documentation accurately describes the services I performed. I attest to the accuracy of the note.                           Clinical Impression:       ICD-10-CM ICD-9-CM   1. Tendinosis of rotator cuff M67.819 726.10   2. Left shoulder pain M25.512 719.41         Disposition:   Disposition: Discharged  Condition: Stable    Scribe Attestation: I, Luis Carlos Carreon, personally performed the services described in this documentation. All medical record entries made by the scribe were at my direction and in my presence.  I have reviewed the chart and agree that the record reflects my personal performance and is accurate and complete.               Luis Carlos Carreon PA-C  12/10/19 1701

## 2019-12-10 NOTE — TELEPHONE ENCOUNTER
----- Message from Amanda Arauz sent at 12/10/2019  7:39 AM CST -----  Contact: Self   Type: Patient Call Back    What is the request in detail: Pt requesting for something to be called into pharamcy for shoulder pain.     Can the clinic reply by [facility]? No    Would the patient rather a call back or a response via [facility]? Call back     Best call back number: [phone]

## 2020-01-13 ENCOUNTER — TELEPHONE (OUTPATIENT)
Dept: FAMILY MEDICINE | Facility: CLINIC | Age: 45
End: 2020-01-13

## 2020-01-13 NOTE — TELEPHONE ENCOUNTER
----- Message from Angelica Jacobs sent at 1/13/2020  8:35 AM CST -----  Contact: EZE DUENAS [[id number]]  Name of Who is Calling: EZE DUENAS [[id number]]    What is the request in detail: (M) Would like to speak with staff to schedule annual appointment. Please contact to further discuss and advise      Can the clinic reply by [facility]: no    What Number to Call Back if not in [facility]: [phone]

## 2020-01-17 DIAGNOSIS — I10 ESSENTIAL HYPERTENSION: ICD-10-CM

## 2020-01-20 RX ORDER — METOPROLOL SUCCINATE 100 MG/1
TABLET, EXTENDED RELEASE ORAL
Qty: 90 TABLET | Refills: 0 | Status: SHIPPED | OUTPATIENT
Start: 2020-01-20 | End: 2020-04-03 | Stop reason: SDUPTHER

## 2020-01-22 ENCOUNTER — PATIENT OUTREACH (OUTPATIENT)
Dept: ADMINISTRATIVE | Facility: HOSPITAL | Age: 45
End: 2020-01-22

## 2020-02-05 ENCOUNTER — OFFICE VISIT (OUTPATIENT)
Dept: FAMILY MEDICINE | Facility: CLINIC | Age: 45
End: 2020-02-05
Payer: MEDICAID

## 2020-02-05 ENCOUNTER — HOSPITAL ENCOUNTER (OUTPATIENT)
Dept: RADIOLOGY | Facility: HOSPITAL | Age: 45
Discharge: HOME | End: 2020-02-05
Attending: INTERNAL MEDICINE
Payer: MEDICAID

## 2020-02-05 VITALS
SYSTOLIC BLOOD PRESSURE: 134 MMHG | OXYGEN SATURATION: 95 % | HEART RATE: 76 BPM | WEIGHT: 223.56 LBS | BODY MASS INDEX: 43.89 KG/M2 | TEMPERATURE: 98 F | DIASTOLIC BLOOD PRESSURE: 82 MMHG | HEIGHT: 60 IN

## 2020-02-05 DIAGNOSIS — F41.9 ANXIETY: ICD-10-CM

## 2020-02-05 DIAGNOSIS — E66.01 CLASS 3 SEVERE OBESITY DUE TO EXCESS CALORIES WITH SERIOUS COMORBIDITY AND BODY MASS INDEX (BMI) OF 40.0 TO 44.9 IN ADULT: ICD-10-CM

## 2020-02-05 DIAGNOSIS — M79.662 PAIN IN LEFT SHIN: ICD-10-CM

## 2020-02-05 DIAGNOSIS — Z12.31 SCREENING MAMMOGRAM, ENCOUNTER FOR: ICD-10-CM

## 2020-02-05 DIAGNOSIS — M75.81 TENDINITIS OF BOTH ROTATOR CUFFS: Primary | ICD-10-CM

## 2020-02-05 DIAGNOSIS — M75.82 TENDINITIS OF BOTH ROTATOR CUFFS: Primary | ICD-10-CM

## 2020-02-05 DIAGNOSIS — I10 ESSENTIAL HYPERTENSION: ICD-10-CM

## 2020-02-05 PROCEDURE — 99214 OFFICE O/P EST MOD 30 MIN: CPT | Mod: S$PBB,,, | Performed by: INTERNAL MEDICINE

## 2020-02-05 PROCEDURE — 99999 PR PBB SHADOW E&M-EST. PATIENT-LVL IV: CPT | Mod: PBBFAC,,, | Performed by: INTERNAL MEDICINE

## 2020-02-05 PROCEDURE — 73590 XR TIBIA FIBULA 2 VIEW LEFT: ICD-10-PCS | Mod: 26,LT,, | Performed by: RADIOLOGY

## 2020-02-05 PROCEDURE — 99214 PR OFFICE/OUTPT VISIT, EST, LEVL IV, 30-39 MIN: ICD-10-PCS | Mod: S$PBB,,, | Performed by: INTERNAL MEDICINE

## 2020-02-05 PROCEDURE — 73590 X-RAY EXAM OF LOWER LEG: CPT | Mod: TC,FY,PO,LT

## 2020-02-05 PROCEDURE — 99999 PR PBB SHADOW E&M-EST. PATIENT-LVL IV: ICD-10-PCS | Mod: PBBFAC,,, | Performed by: INTERNAL MEDICINE

## 2020-02-05 PROCEDURE — 99214 OFFICE O/P EST MOD 30 MIN: CPT | Mod: PBBFAC,PO | Performed by: INTERNAL MEDICINE

## 2020-02-05 PROCEDURE — 73590 X-RAY EXAM OF LOWER LEG: CPT | Mod: 26,LT,, | Performed by: RADIOLOGY

## 2020-02-05 RX ORDER — ESCITALOPRAM OXALATE 5 MG/1
5 TABLET ORAL DAILY
Qty: 30 TABLET | Refills: 0 | Status: SHIPPED | OUTPATIENT
Start: 2020-02-05 | End: 2020-03-04 | Stop reason: SDUPTHER

## 2020-02-05 RX ORDER — TIZANIDINE 2 MG/1
TABLET ORAL
Qty: 30 TABLET | Refills: 0 | Status: SHIPPED | OUTPATIENT
Start: 2020-02-05 | End: 2020-07-14 | Stop reason: SDUPTHER

## 2020-02-05 RX ORDER — MELOXICAM 7.5 MG/1
7.5 TABLET ORAL DAILY
Qty: 14 TABLET | Refills: 0 | Status: SHIPPED | OUTPATIENT
Start: 2020-02-05 | End: 2020-02-19
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
## 2020-02-05 NOTE — PATIENT INSTRUCTIONS
Please contact our Referrals Coordinator at [phone] if you have not received a call within 5 business days to check on the status of your referral (ORTHOPEDIC SURGERY)    [facility] [place] [facility] [address] [place] [address]

## 2020-02-07 ENCOUNTER — HOSPITAL ENCOUNTER (OUTPATIENT)
Dept: RADIOLOGY | Facility: HOSPITAL | Age: 45
Discharge: HOME | End: 2020-02-07
Attending: INTERNAL MEDICINE
Payer: MEDICAID

## 2020-02-07 DIAGNOSIS — Z12.31 SCREENING MAMMOGRAM, ENCOUNTER FOR: ICD-10-CM

## 2020-02-07 PROCEDURE — 77063 MAMMO DIGITAL SCREENING BILAT WITH TOMOSYNTHESIS_CAD: ICD-10-PCS | Mod: 26,,, | Performed by: RADIOLOGY

## 2020-02-07 PROCEDURE — 77063 BREAST TOMOSYNTHESIS BI: CPT | Mod: 26,,, | Performed by: RADIOLOGY

## 2020-02-07 PROCEDURE — 77067 SCR MAMMO BI INCL CAD: CPT | Mod: TC

## 2020-02-07 PROCEDURE — 77067 SCR MAMMO BI INCL CAD: CPT | Mod: 26,,, | Performed by: RADIOLOGY

## 2020-02-07 PROCEDURE — 77067 MAMMO DIGITAL SCREENING BILAT WITH TOMOSYNTHESIS_CAD: ICD-10-PCS | Mod: 26,,, | Performed by: RADIOLOGY

## 2020-02-21 ENCOUNTER — HOSPITAL ENCOUNTER (OUTPATIENT)
Dept: RADIOLOGY | Facility: HOSPITAL | Age: 45
Discharge: HOME | End: 2020-02-21
Attending: INTERNAL MEDICINE
Payer: MEDICAID

## 2020-02-21 DIAGNOSIS — R92.8 ABNORMAL MAMMOGRAM OF LEFT BREAST: ICD-10-CM

## 2020-02-21 PROCEDURE — 77065 DX MAMMO INCL CAD UNI: CPT | Mod: 26,LT,, | Performed by: RADIOLOGY

## 2020-02-21 PROCEDURE — 77065 MAMMO DIGITAL DIAGNOSTIC LEFT WITH TOMOSYNTHESIS_CAD: ICD-10-PCS | Mod: 26,LT,, | Performed by: RADIOLOGY

## 2020-02-21 PROCEDURE — 76642 US BREAST LEFT LIMITED: ICD-10-PCS | Mod: 26,LT,, | Performed by: RADIOLOGY

## 2020-02-21 PROCEDURE — 77065 DX MAMMO INCL CAD UNI: CPT | Mod: TC,LT

## 2020-02-21 PROCEDURE — 76642 ULTRASOUND BREAST LIMITED: CPT | Mod: 26,LT,, | Performed by: RADIOLOGY

## 2020-02-21 PROCEDURE — 77061 BREAST TOMOSYNTHESIS UNI: CPT | Mod: 26,,, | Performed by: RADIOLOGY

## 2020-02-21 PROCEDURE — 77061 MAMMO DIGITAL DIAGNOSTIC LEFT WITH TOMOSYNTHESIS_CAD: ICD-10-PCS | Mod: 26,,, | Performed by: RADIOLOGY

## 2020-02-21 PROCEDURE — 76642 ULTRASOUND BREAST LIMITED: CPT | Mod: TC,LT

## 2020-02-21 PROCEDURE — 77061 BREAST TOMOSYNTHESIS UNI: CPT | Mod: TC,LT

## 2020-02-22 DIAGNOSIS — N63.20 BREAST MASS, LEFT: Primary | ICD-10-CM

## 2020-03-02 ENCOUNTER — TELEPHONE (OUTPATIENT)
Dept: FAMILY MEDICINE | Facility: CLINIC | Age: 45
End: 2020-03-02

## 2020-03-02 NOTE — TELEPHONE ENCOUNTER
I spoke with the patient regarding a referral to Breast Surgery, she states that she has seen a physician and has a biopsy scheduled for 3/10/20

## 2020-03-04 DIAGNOSIS — F41.9 ANXIETY: ICD-10-CM

## 2020-03-04 RX ORDER — ESCITALOPRAM OXALATE 5 MG/1
TABLET ORAL
Qty: 30 TABLET | Refills: 0 | Status: SHIPPED | OUTPATIENT
Start: 2020-03-04 | End: 2020-04-03 | Stop reason: SDUPTHER

## 2020-03-07 ENCOUNTER — HOSPITAL ENCOUNTER (EMERGENCY)
Facility: HOSPITAL | Age: 45
Discharge: HOME | End: 2020-03-07
Attending: EMERGENCY MEDICINE
Payer: MEDICAID

## 2020-03-07 VITALS
OXYGEN SATURATION: 100 % | TEMPERATURE: 99 F | BODY MASS INDEX: 43.19 KG/M2 | DIASTOLIC BLOOD PRESSURE: 101 MMHG | HEIGHT: 60 IN | HEART RATE: 105 BPM | RESPIRATION RATE: 17 BRPM | WEIGHT: 220 LBS | SYSTOLIC BLOOD PRESSURE: 186 MMHG

## 2020-03-07 DIAGNOSIS — J34.89 NOSE PAIN: Primary | ICD-10-CM

## 2020-03-07 PROCEDURE — 99282 EMERGENCY DEPT VISIT SF MDM: CPT

## 2020-03-08 NOTE — DISCHARGE INSTRUCTIONS
Continue with Motrin as needed for pain. Please follow-up with your primary care provider for reevaluation. Please return if area becomes red and warm, if you begin with fever, if pain worsens despite treatment, if any other problems occur.

## 2020-03-08 NOTE — ED TRIAGE NOTES
"Pt presents to ED via personal transportation with c/o nasal swelling and pain. Pt states "I woke up Wednesday and my nose was swollen and ir has not gone down since then, so I wanted to come and make sure that I didn't have staph or anything."   "

## 2020-03-08 NOTE — ED PROVIDER NOTES
Encounter Date: 3/7/2020       History     Chief Complaint   Patient presents with    Nose Pain     pt reports pain to bridge of nose since this past Wednesday 3/4/20; pt denies any known injury to nose or hx of this problem; pt denies nasal drainage but does report that her nose is very dry; mild swelling noted to bridge of nose     45yo F with chief complaint atraumatic nose pain x 4 days. Pt states pain and swelling to bridge of nose. No erythema/warmth. No previous injury or issues. No congestion. No sinus pain or pressure. No previous similar issues. No rash.     Hx CHF, PE, HTN.         Review of patient's allergies indicates:   Allergen Reactions    Lisinopril Other (See Comments)     Angioedema     Past Medical History:   Diagnosis Date    CHF (congestive heart failure)     History of pulmonary embolism 2005    Hypertension      Past Surgical History:   Procedure Laterality Date    FRACTURE SURGERY      Plate in right arm       VAGINAL DELIVERY       Family History   Problem Relation Age of Onset    Cancer Maternal Grandmother     Breast cancer Neg Hx     Ovarian cancer Neg Hx      Social History     Tobacco Use    Smoking status: Never Smoker    Smokeless tobacco: Never Used   Substance Use Topics    Alcohol use: Never     Frequency: Never     Comment: Social     Drug use: No     Review of Systems   Constitutional: Negative for chills and fever.   HENT: Negative for congestion, ear discharge, ear pain, rhinorrhea, sinus pressure, sinus pain and sore throat.         Nose pain   Eyes: Negative for discharge, redness and visual disturbance.   Musculoskeletal: Negative for neck pain and neck stiffness.   Skin: Negative for rash and wound.   Neurological: Negative for dizziness, light-headedness and headaches.       Physical Exam     Initial Vitals [03/07/20 2050]   BP Pulse Resp Temp SpO2   (!) 186/101 105 17 99 °F (37.2 °C) 100 %      MAP       --         Physical Exam    Nursing note and vitals  reviewed.  Constitutional: She appears well-developed and well-nourished. She is not diaphoretic. No distress.   HENT:   Head: Normocephalic and atraumatic.   ttp to bridge of nose; no bony abnormality. Mild L sided posterior turbinate edema. No septal hematoma. Nares patent. No tenderness to percussion of facial sinuses.    No facial erythema/warmth, no induration or fluctuance. No significant facial or neck swelling.   Neck: Neck supple.   Pulmonary/Chest: No respiratory distress.   Neurological: She is alert and oriented to person, place, and time.   Skin: Skin is warm and dry. Capillary refill takes less than 2 seconds. No erythema.   Psychiatric: She has a normal mood and affect. Thought content normal.         ED Course   Procedures  Labs Reviewed - No data to display       Imaging Results    None          Medical Decision Making:   Differential Diagnosis:   Contusion, fracture, sinusitis, cellulitis  ED Management:  No trauma, doubt fx despite bony tenderness. No induration, no erythema/warmth. No open wound. Denies congestion, rhinorrhea, sinus pain or pressure no headache. No rash or vesicular lesions.    Point tenderness to nasal bone. Unsure cause. Doubt emergent process. F/U with PCP.                                 Clinical Impression:       ICD-10-CM ICD-9-CM   1. Nose pain J34.89 478.19         Disposition:   Disposition: Discharged  Condition: Stable     ED Disposition Condition    Discharge Stable        ED Prescriptions     None        Follow-up Information     Follow up With Specialties Details Why Contact Info    Geovanni Britton MD Internal Medicine, Pediatrics Schedule an appointment as soon as possible for a visit  For reevaluation [address] [address]  [phone]      [facility] Emergency [facility]  As needed, If symptoms worsen [address]  [facility] [address] [phone]                                     Abel Sherman,  [place]  03/08/20 0723

## 2020-03-10 ENCOUNTER — HOSPITAL ENCOUNTER (OUTPATIENT)
Dept: RADIOLOGY | Facility: HOSPITAL | Age: 45
Discharge: HOME | End: 2020-03-10
Attending: INTERNAL MEDICINE
Payer: MEDICAID

## 2020-03-10 DIAGNOSIS — R92.8 ABNORMAL MAMMOGRAM OF LEFT BREAST: ICD-10-CM

## 2020-03-10 PROCEDURE — 88305 TISSUE EXAM BY PATHOLOGIST: CPT | Mod: 26,,, | Performed by: PATHOLOGY

## 2020-03-10 PROCEDURE — 19083 US BREAST BIOPSY WITH IMAGING 1ST SITE LEFT: ICD-10-PCS | Mod: LT,,, | Performed by: RADIOLOGY

## 2020-03-10 PROCEDURE — 88342 IMHCHEM/IMCYTCHM 1ST ANTB: CPT | Mod: 26,,, | Performed by: PATHOLOGY

## 2020-03-10 PROCEDURE — 77065 MAMMO DIGITAL DIAGNOSTIC LEFT WITH CAD: ICD-10-PCS | Mod: 26,LT,, | Performed by: RADIOLOGY

## 2020-03-10 PROCEDURE — 77065 DX MAMMO INCL CAD UNI: CPT | Mod: 26,LT,, | Performed by: RADIOLOGY

## 2020-03-10 PROCEDURE — 88342 IMHCHEM/IMCYTCHM 1ST ANTB: CPT | Performed by: PATHOLOGY

## 2020-03-10 PROCEDURE — 27200937 US BREAST BIOPSY WITH IMAGING 1ST SITE LEFT

## 2020-03-10 PROCEDURE — 88305 TISSUE EXAM BY PATHOLOGIST: CPT | Performed by: PATHOLOGY

## 2020-03-10 PROCEDURE — 19083 BX BREAST 1ST LESION US IMAG: CPT | Mod: LT,,, | Performed by: RADIOLOGY

## 2020-03-10 PROCEDURE — 77065 DX MAMMO INCL CAD UNI: CPT | Mod: TC,LT

## 2020-03-10 PROCEDURE — 88342 CHG IMMUNOCYTOCHEMISTRY: ICD-10-PCS | Mod: 26,,, | Performed by: PATHOLOGY

## 2020-03-10 PROCEDURE — 88305 TISSUE EXAM BY PATHOLOGIST: ICD-10-PCS | Mod: 26,,, | Performed by: PATHOLOGY

## 2020-03-19 LAB
FINAL PATHOLOGIC DIAGNOSIS: NORMAL
GROSS: NORMAL
MICROSCOPIC EXAM: NORMAL

## 2020-04-03 ENCOUNTER — OFFICE VISIT (OUTPATIENT)
Dept: FAMILY MEDICINE | Facility: CLINIC | Age: 45
End: 2020-04-03
Payer: MEDICAID

## 2020-04-03 DIAGNOSIS — F41.9 ANXIETY: Primary | ICD-10-CM

## 2020-04-03 DIAGNOSIS — I10 ESSENTIAL HYPERTENSION: ICD-10-CM

## 2020-04-03 PROCEDURE — 99213 PR OFFICE/OUTPT VISIT, EST, LEVL III, 20-29 MIN: ICD-10-PCS | Mod: 95,,, | Performed by: INTERNAL MEDICINE

## 2020-04-03 PROCEDURE — 99213 OFFICE O/P EST LOW 20 MIN: CPT | Mod: 95,,, | Performed by: INTERNAL MEDICINE

## 2020-04-03 RX ORDER — ESCITALOPRAM OXALATE 5 MG/1
5 TABLET ORAL DAILY
Qty: 90 TABLET | Refills: 0 | Status: SHIPPED | OUTPATIENT
Start: 2020-04-03 | End: 2020-07-24

## 2020-04-03 RX ORDER — METOPROLOL SUCCINATE 100 MG/1
100 TABLET, EXTENDED RELEASE ORAL DAILY
Qty: 90 TABLET | Refills: 0 | Status: SHIPPED | OUTPATIENT
Start: 2020-04-03 | End: 2020-06-30
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
## 2020-04-24 ENCOUNTER — NURSE TRIAGE (OUTPATIENT)
Dept: ADMINISTRATIVE | Facility: CLINIC | Age: 45
End: 2020-04-24

## 2020-04-24 ENCOUNTER — HOSPITAL ENCOUNTER (EMERGENCY)
Facility: HOSPITAL | Age: 45
Discharge: HOME | End: 2020-04-24
Attending: EMERGENCY MEDICINE
Payer: MEDICAID

## 2020-04-24 VITALS
RESPIRATION RATE: 14 BRPM | WEIGHT: 220 LBS | OXYGEN SATURATION: 100 % | SYSTOLIC BLOOD PRESSURE: 173 MMHG | TEMPERATURE: 100 F | HEIGHT: 60 IN | BODY MASS INDEX: 43.19 KG/M2 | HEART RATE: 83 BPM | DIASTOLIC BLOOD PRESSURE: 83 MMHG

## 2020-04-24 DIAGNOSIS — I10 UNCONTROLLED HYPERTENSION: Primary | ICD-10-CM

## 2020-04-24 LAB
ANION GAP SERPL CALC-SCNC: 16 MMOL/L (ref 8–16)
BUN SERPL-MCNC: 11 MG/DL (ref 6–30)
CHLORIDE SERPL-SCNC: 104 MMOL/L (ref 95–110)
CREAT SERPL-MCNC: 0.7 MG/DL (ref 0.5–1.4)
GLUCOSE SERPL-MCNC: 95 MG/DL (ref 70–110)
HCT VFR BLD CALC: 38 %PCV (ref 36–54)
POC IONIZED CALCIUM: 1.27 MMOL/L (ref 1.06–1.42)
POC TCO2 (MEASURED): 26 MMOL/L (ref 23–29)
POTASSIUM BLD-SCNC: 3.7 MMOL/L (ref 3.5–5.1)
SAMPLE: NORMAL
SODIUM BLD-SCNC: 141 MMOL/L (ref 136–145)

## 2020-04-24 PROCEDURE — 85014 HEMATOCRIT: CPT

## 2020-04-24 PROCEDURE — 82565 ASSAY OF CREATININE: CPT

## 2020-04-24 PROCEDURE — 82330 ASSAY OF CALCIUM: CPT

## 2020-04-24 PROCEDURE — 99900035 HC TECH TIME PER 15 MIN (STAT)

## 2020-04-24 PROCEDURE — 99283 EMERGENCY DEPT VISIT LOW MDM: CPT

## 2020-04-24 PROCEDURE — 84132 ASSAY OF SERUM POTASSIUM: CPT

## 2020-04-24 PROCEDURE — 25000003 PHARM REV CODE 250: Performed by: EMERGENCY MEDICINE

## 2020-04-24 PROCEDURE — 84295 ASSAY OF SERUM SODIUM: CPT

## 2020-04-24 RX ORDER — CLONIDINE 0.2 MG/24H
1 PATCH, EXTENDED RELEASE TRANSDERMAL
Qty: 4 PATCH | Refills: 1 | Status: SHIPPED | OUTPATIENT
Start: 2020-04-24 | End: 2020-08-21

## 2020-04-24 RX ORDER — CLONIDINE HYDROCHLORIDE 0.1 MG/1
0.1 TABLET ORAL
Status: COMPLETED | OUTPATIENT
Start: 2020-04-24 | End: 2020-04-24

## 2020-04-24 RX ORDER — CLONIDINE 0.2 MG/24H
1 PATCH, EXTENDED RELEASE TRANSDERMAL
Status: DISCONTINUED | OUTPATIENT
Start: 2020-04-24 | End: 2020-04-24 | Stop reason: HOSPADM

## 2020-04-24 RX ADMIN — CLONIDINE 1 PATCH: 0.2 PATCH TRANSDERMAL at 01:04

## 2020-04-24 RX ADMIN — CLONIDINE HYDROCHLORIDE 0.1 MG: 0.1 TABLET ORAL at 01:04

## 2020-04-24 NOTE — ED PROVIDER NOTES
"Encounter Date: 4/24/2020    SCRIBE #1 NOTE: I, Rosio Bravo, am scribing for, and in the presence of,  West Perez MD. I have scribed the following portions of the note - Other sections scribed: HPI, ROS.       History     Chief Complaint   Patient presents with    Hypertension     "shade came down on eyes & white dots & I felt tingly all over" "I figured my b/p high so called dr" elevated blood pressure     CC: Hypertension    HPI: This is a 44 y.o. F who has CHF, HTN, and Hx of Pulmonary Embolism who presents to the ED for elevated blood pressure. Pt reports seeing "shadows over both eyes," and experiencing tingling in the mouth today, which she attributes to elevated blood pressure. She states that symptoms spontaneously resolved. Pt reports previous episode of similar problem secondary to elevated blood pressure. She did not measure her blood pressure today. She takes Metoprolol Succinate 100mg once at night, which she last took last night. Pt also states that she consumed coffee today, which usually causes blood pressure to increase. Pt denies fever, chills, ear pain, sore throat, eye problem, cough, SOB, CP, abdominal pain, nausea, vomiting, diarrhea, dysuria, arm or leg problems, neck pain, back pain, rash, or headache.     The history is provided by the patient. No  was used.     Review of patient's allergies indicates:   Allergen Reactions    Lisinopril Other (See Comments)     Angioedema     Past Medical History:   Diagnosis Date    CHF (congestive heart failure)     History of pulmonary embolism 2005    Hypertension      Past Surgical History:   Procedure Laterality Date    FRACTURE SURGERY      Plate in right arm       VAGINAL DELIVERY       Family History   Problem Relation Age of Onset    Cancer Maternal Grandmother     Breast cancer Neg Hx     Ovarian cancer Neg Hx      Social History     Tobacco Use    Smoking status: Never Smoker    Smokeless tobacco: Never " Used   Substance Use Topics    Alcohol use: Never     Frequency: Never     Comment: Social     Drug use: No     Review of Systems   Constitutional: Negative for chills and fever.   HENT: Negative for ear pain and sore throat.    Eyes: Negative for visual disturbance.   Respiratory: Negative for cough and shortness of breath.    Cardiovascular: Negative for chest pain.   Gastrointestinal: Negative for abdominal pain, diarrhea, nausea and vomiting.   Genitourinary: Negative for dysuria.   Musculoskeletal: Negative for back pain, myalgias and neck pain.   Skin: Negative for rash.   Neurological: Negative for weakness and headaches.   Hematological: Does not bruise/bleed easily.       Physical Exam     Initial Vitals [04/24/20 1203]   BP Pulse Resp Temp SpO2   (!) 207/113 84 18 98.9 °F (37.2 °C) 100 %      MAP       --         Physical Exam  The patient was examined specifically for the following:   General:No significant distress, Good color, Warm and dry. Head and neck:Scalp atraumatic, Neck supple. Neurological:Appropriate conversation, Gross motor deficits. Eyes:Conjugate gaze, Clear corneas. ENT: No epistaxis. Cardiac: Regular rate and rhythm, Grossly normal heart tones. Pulmonary: Wheezing, Rales. Gastrointestinal: Abdominal tenderness, Abdominal distention. Musculoskeletal: Extremity deformity, Apparent pain with range of motion of the joints. Skin: Rash.   The findings on examination were normal except for the following:  The patient's blood pressure in triage was 207/113.  The lungs are clear.  The heart tones are normal.  The abdomen is nontender.  This patient is morbidly obese.   ED Course   Procedures  Labs Reviewed   SARS-COV-2 RNA AMPLIFICATION, QUAL   ISTAT PROCEDURE   ISTAT CHEM8          Imaging Results    None       Medical decision making:  Given the above this patient presents to the emergency room essentially with uncontrolled hypertension.  Her blood pressure fell to 179 over 85 during the visit  without treatment.  To treatment her blood pressure was 157/72.  All other symptoms have resolved.  I doubt stroke intracranial bleed and other organ damage.  I will discharge to follow up with primary care.  I will treat with clonidine.                                     Clinical Impression:       ICD-10-CM ICD-9-CM   1. Uncontrolled hypertension I10 401.9             ED Disposition Condition    Discharge Stable        ED Prescriptions     Medication Sig Dispense Start Date End Date Auth. Provider    cloNIDine 0.2 mg/24 hr td ptwk (CATAPRES) 0.2 mg/24 hr Place 1 patch onto the skin every 7 days. 4 patch 4/24/2020 4/24/2021 West Perez MD        Follow-up Information     Follow up With Specialties Details Why Contact Info    Geovanni Britton MD Internal Medicine, Pediatrics In 3 days  [address]  [place] [address] [phone]                          I personally performed the services described in this documentation.  All medical record  entries made by the scribe are at my direction and in my presence.  Signed, Dr. Ana Perez MD  04/24/20 1197

## 2020-04-24 NOTE — TELEPHONE ENCOUNTER
Spoke with pt:  Had headache last night, woke and still had headache this am: took 2 tylenol this am and headache is gone but still has tinglign to mouth bilaterally. Had vision changes: seeing dark spots but feels like have dark spots- vision improved 10 min ago. No headache at this time. Is having tingling in mouth- left and right side of mouth. Smile is even. Had frappe this am from [employer]. Instructed  pt to go to ED now for neuro changes with headache and call 911 for any worsening of s/s. . Verbalizes understanding. Does not have BP machine at home-per front end.      Reason for Disposition   Headache (with neurologic deficit)    Additional Information   Negative: Difficult to awaken or acting confused (e.g., disoriented, slurred speech)   Negative: New neurologic deficit that is present NOW, sudden onset of ANY of the following: * Weakness of the face, arm, or leg on one side of the body * Numbness of the face, arm, or leg on one side of the body * Loss of speech or garbled speech   Negative: Sounds like a life-threatening emergency to the triager    Protocols used: NEUROLOGIC DEFICIT-A-OH

## 2020-04-24 NOTE — DISCHARGE INSTRUCTIONS
Please continue your blood pressure medicines.  Please add the clonidine patch to your medicine list.  Please follow-up with your primary care doctor this week.  Rest

## 2020-05-11 ENCOUNTER — PATIENT OUTREACH (OUTPATIENT)
Dept: ADMINISTRATIVE | Facility: OTHER | Age: 45
End: 2020-05-11

## 2020-05-12 DIAGNOSIS — M25.512 BILATERAL SHOULDER PAIN, UNSPECIFIED CHRONICITY: Primary | ICD-10-CM

## 2020-05-12 DIAGNOSIS — M25.511 BILATERAL SHOULDER PAIN, UNSPECIFIED CHRONICITY: Primary | ICD-10-CM

## 2020-05-18 ENCOUNTER — TELEPHONE (OUTPATIENT)
Dept: ORTHOPEDICS | Facility: CLINIC | Age: 45
End: 2020-05-18

## 2020-06-22 ENCOUNTER — TELEPHONE (OUTPATIENT)
Dept: SURGERY | Facility: CLINIC | Age: 45
End: 2020-06-22

## 2020-06-22 NOTE — TELEPHONE ENCOUNTER
Call to pt regarding appt today at 9:30 am with Dr Pantoja. Pt did not answer call, voice mail not set up, unable to leave a message. Will try calling pt later.

## 2020-07-14 DIAGNOSIS — M75.81 TENDINITIS OF BOTH ROTATOR CUFFS: ICD-10-CM

## 2020-07-14 DIAGNOSIS — M75.82 TENDINITIS OF BOTH ROTATOR CUFFS: ICD-10-CM

## 2020-07-14 RX ORDER — TIZANIDINE 2 MG/1
TABLET ORAL
Qty: 30 TABLET | Refills: 0 | Status: SHIPPED | OUTPATIENT
Start: 2020-07-14 | End: 2020-08-21

## 2020-07-14 NOTE — TELEPHONE ENCOUNTER
----- Message from Carolyn Leon sent at 7/14/2020 11:33 AM CDT -----  Type: RX Refill Request    Who Called: pt     Have you contacted your pharmacy: no     Refill or New Rx: refill     RX Name and Strength: tiZANidine (ZANAFLEX) 2 MG tablet    How is the patient currently taking it? (ex. 1XDay):    Is this a 30 day or 90 day RX:    Preferred Pharmacy with phone number: ..  [id number] - [place], [place] [facility] [place] [address] [address] [place] [address]  Phone: [phone] Fax: [phone]    Local or Mail Order: local    Ordering Provider:    Would the patient rather a call back or a response via [facility]? Call back     Best Call Back Number: [phone]    Additional Information:

## 2020-07-15 ENCOUNTER — TELEPHONE (OUTPATIENT)
Dept: FAMILY MEDICINE | Facility: CLINIC | Age: 45
End: 2020-07-15

## 2020-07-15 NOTE — TELEPHONE ENCOUNTER
----- Message from Bassam Hitchcock sent at 7/15/2020  8:47 AM CDT -----  Regarding: request for pain meds  Type: Patient Call Back    Who called:Asha     What is the request in detail: The patient is requesting a call back from the staff. She is requesting pain meds. She stated that she received the muscle relaxers, but is still experiencing pain    Can the clinic reply by [facility]?no    Would the patient rather a call back or a response via [facility]? Call back     Best call back number:[phone]

## 2020-07-16 DIAGNOSIS — M25.511 RIGHT SHOULDER PAIN, UNSPECIFIED CHRONICITY: Primary | ICD-10-CM

## 2020-07-20 ENCOUNTER — OFFICE VISIT (OUTPATIENT)
Dept: ORTHOPEDICS | Facility: CLINIC | Age: 45
End: 2020-07-20
Payer: MEDICAID

## 2020-07-20 VITALS
BODY MASS INDEX: 44.58 KG/M2 | SYSTOLIC BLOOD PRESSURE: 144 MMHG | OXYGEN SATURATION: 98 % | HEART RATE: 103 BPM | RESPIRATION RATE: 18 BRPM | WEIGHT: 227.06 LBS | DIASTOLIC BLOOD PRESSURE: 92 MMHG | HEIGHT: 60 IN

## 2020-07-20 DIAGNOSIS — M25.511 BILATERAL SHOULDER PAIN, UNSPECIFIED CHRONICITY: Primary | ICD-10-CM

## 2020-07-20 DIAGNOSIS — M25.512 BILATERAL SHOULDER PAIN, UNSPECIFIED CHRONICITY: Primary | ICD-10-CM

## 2020-07-20 PROCEDURE — 99204 PR OFFICE/OUTPT VISIT, NEW, LEVL IV, 45-59 MIN: ICD-10-PCS | Mod: 25,S$PBB,, | Performed by: ORTHOPAEDIC SURGERY

## 2020-07-20 PROCEDURE — 99213 OFFICE O/P EST LOW 20 MIN: CPT | Mod: PBBFAC,25,PN | Performed by: ORTHOPAEDIC SURGERY

## 2020-07-20 PROCEDURE — 20610 LARGE JOINT ASPIRATION/INJECTION: R SUBACROMIAL BURSA: ICD-10-PCS | Mod: S$PBB,RT,, | Performed by: ORTHOPAEDIC SURGERY

## 2020-07-20 PROCEDURE — 99999 PR PBB SHADOW E&M-EST. PATIENT-LVL III: CPT | Mod: PBBFAC,,, | Performed by: ORTHOPAEDIC SURGERY

## 2020-07-20 PROCEDURE — 99999 PR PBB SHADOW E&M-EST. PATIENT-LVL III: ICD-10-PCS | Mod: PBBFAC,,, | Performed by: ORTHOPAEDIC SURGERY

## 2020-07-20 PROCEDURE — 99204 OFFICE O/P NEW MOD 45 MIN: CPT | Mod: 25,S$PBB,, | Performed by: ORTHOPAEDIC SURGERY

## 2020-07-20 PROCEDURE — 20610 DRAIN/INJ JOINT/BURSA W/O US: CPT | Mod: PBBFAC,PN | Performed by: ORTHOPAEDIC SURGERY

## 2020-07-20 RX ORDER — TRIAMCINOLONE ACETONIDE 40 MG/ML
40 INJECTION, SUSPENSION INTRA-ARTICULAR; INTRAMUSCULAR
Status: DISCONTINUED | OUTPATIENT
Start: 2020-07-20 | End: 2020-07-20 | Stop reason: HOSPADM

## 2020-07-20 RX ORDER — LIDOCAINE HYDROCHLORIDE 10 MG/ML
5 INJECTION, SOLUTION EPIDURAL; INFILTRATION; INTRACAUDAL; PERINEURAL
Status: DISCONTINUED | OUTPATIENT
Start: 2020-07-20 | End: 2020-07-20 | Stop reason: HOSPADM

## 2020-07-20 RX ADMIN — LIDOCAINE HYDROCHLORIDE 5 ML: 10 INJECTION, SOLUTION EPIDURAL; INFILTRATION; INTRACAUDAL; PERINEURAL at 10:07

## 2020-07-20 RX ADMIN — TRIAMCINOLONE ACETONIDE 40 MG: 40 INJECTION, SUSPENSION INTRA-ARTICULAR; INTRAMUSCULAR at 10:07

## 2020-07-20 NOTE — LETTER
July 20, 2020      Geovanni Britton MD  [address]  [place] [address]           [facility]  [address] [facility]  [place] [address]  Phone: [phone]          Patient: Asha Paige   MR Number: [id number]   YOB: 1975   Date of Visit: 7/20/2020       Dear Dr. Geovanni Britton:    Thank you for referring Asha Paige to me for evaluation. Attached you will find relevant portions of my assessment and plan of care.    If you have questions, please do not hesitate to call me. I look forward to following Asha Paige along with you.    Sincerely,    Caridad Agudelo MD    Enclosure  CC:  No Recipients    If you would like to receive this communication electronically, please contact [email or web address] or [phone] to request more information on [employer] Link access.    For providers and/or their staff who would like to refer a patient to [facility], please contact us through our one-stop-shop provider referral line, [facility], at [phone].    If you feel you have received this communication in error or would no longer like to receive these types of communications, please e-mail [email or web address]

## 2020-07-20 NOTE — PROGRESS NOTES
"Chief Complaint   Patient presents with    Right Shoulder - Pain     This patient was seen in consultation at the request of Dr. Geovanni Britton     HPI: Asha Paige is a 44 y.o. female who presents today complaining of right shoulder pain  States that she has "flare ups" of pain in the bilateral shoulders periodically. Also has occ knee pain   Duration of symptoms:  About 1 week  Trauma or new activity: no  Pain is constant   Aggravating factors: motion of the arm   Relieving factors: rest   Night pain is present and is disruptive to sleep  Radicular symptoms: no numbness, paresthesias   Associated symptoms:  limited range of motion.    Prior treatment:  OTC NSAIDs , tramadol, zanaflex without improvement in pain.     Pain does interfere with sleep and activities of daily living .    Has been having some lip swelling and hives - unclear if this is from tylenol, metoprolol or motrin     This is the extent of the patient's complaints at this time.     Hand dominance: Right     Occupation: Not currently working, previously worked as a  at a [employer]     Review of Systems   All other systems reviewed and are negative.        Review of patient's allergies indicates:   Allergen Reactions    Lisinopril Other (See Comments)     Angioedema         Current Outpatient Medications:     cetirizine (ZYRTEC) 10 MG tablet, Take 1 tablet (10 mg total) by mouth once daily., Disp: 30 tablet, Rfl: 2    cloNIDine 0.2 mg/24 hr td ptwk (CATAPRES) 0.2 mg/24 hr, Place 1 patch onto the skin every 7 days., Disp: 4 patch, Rfl: 1    escitalopram oxalate (LEXAPRO) 5 MG Tab, Take 1 tablet (5 mg total) by mouth once daily., Disp: 90 tablet, Rfl: 0    metoprolol succinate (TOPROL-XL) 100 MG 24 hr tablet, TAKE 1 TABLET BY MOUTH ONCE DAILY, Disp: 90 tablet, Rfl: 0    tiZANidine (ZANAFLEX) 2 MG tablet, Take 1-2 tablets three times daily as needed for pain, Disp: 30 tablet, Rfl: 0    Past Medical History:   Diagnosis Date    CHF " (congestive heart failure)     History of pulmonary embolism 2005    Hypertension        Patient Active Problem List   Diagnosis    History of pulmonary embolism    Hypertension    AC joint arthropathy    Class 3 severe obesity with serious comorbidity and body mass index (BMI) of 40.0 to 44.9 in adult       Past Surgical History:   Procedure Laterality Date    FRACTURE SURGERY      Plate in right arm       VAGINAL DELIVERY         Social History     Tobacco Use    Smoking status: Never Smoker    Smokeless tobacco: Never Used   Substance Use Topics    Alcohol use: Never     Frequency: Never     Comment: Social     Drug use: No       Family History   Problem Relation Age of Onset    Cancer Maternal Grandmother     Breast cancer Neg Hx     Ovarian cancer Neg Hx        Physical Exam:   Vitals:    07/20/20 0947   BP: (!) 144/92   Pulse: 103   Resp: 18   SpO2: 98%   Weight: 103 kg (227 lb 1.2 oz)   Height: 5' (1.524 m)   PainSc:   6   PainLoc: Shoulder       General:   Body mass index is 44.35 kg/m².  Patient is alert, awake and oriented to time, place and person. Mood and affect are appropriate.  Patient does not appear to be in any distress, denies any constitutional symptoms and appears stated age.   HEENT: Pupils are equal and round, sclera are not injected. External examination of ears and nose reveals no abnormalities. Cranial nerves II-X are grossly intact  Neck: examination demonstrates painless  active range of motion. Spurling's sign is negative  Skin: no rashes, abrasions or open wounds on the affected extremity   Resp: No respiratory distress or audible wheezing   CV: 2+  pulses, all extremities warm and well perfused   Right Shoulder    Shoulder Range of Motion    Right     Left   (Active/Passive)       Forward Elevation     20/150             150/150  External rotation (abduction)    80             80   External rotation (arm at side)  20/45             45/45   Internal rotation in  abduction   70                        70   Internal rotation behind the back  hip             L5     Range of motion is painful     Scapular winging no  Scapular dyskinesia no    Examination of the back shows no atrophy    Acromioclavicular joint is not tender  Crossbody test: negative    Neer's positive  Hawkin's positive    Benito's positive  Drop arm positive  Belly press negative  External rotation lag sign negative  Hornblowers sign negative  Internal rotation lag sign negative    Cuff Strength     Right     Left   Supraspinatus        3/5    5/5  Infraspinatus     5/5    5/5  Subscapularis     5/5    5/5    Deltoid testing            5/5    5/5    Strength w cuff testing limited by pain per patient     Speeds negative  Yergasons negative    Elbow examination demonstrates no tenderness to palpation and has normal range of motion.     ltsi C5-T1  + epl, io, fds, fdp   2+ RP      Imaging:  3 views of the right shoulder:  positive for degenerative changes of the AC joint. The humeral head is well centered on the AP and axillary views.  There is a calcification at the rotator cuff insertion on the greater tuberosity. There is not significant degenerative change of the glenohumeral joint or posterior subluxation of the humeral head. No acute changes or fracture.      I personally reviewed and interpreted the patient's imaging obtained today in clinic     Assessment: 44 y.o. female with right shoulder calcific tendinitis     I explained my diagnostic impression and the reasoning behind it in detail, using layman's terms.  Models and/or pictures were used to help in the explanation.  We discussed non operative and operative treatment modalities -- She would like to start with non-operative treatment in the form of injection, PT.     Plan:   - PT referral sent   - Injection of the right subacromial space  performed, please see procedure note for more details.  Prior to the injection risks and benefits of corticosteroid  injection were discussed with the patient including pain, infection, bleeding, skin color changes, swelling, steroid flare. We discussed that over time injections can result in chondral damage, acceleration of arthritis formation, damage to tendons and damage to joints.  The patient consented for the procedure.  Post-injection instructions were given to the patient in writing.  - Pain meds per PCP - questionable allergic reaction to tylenol and NSAIDs  - Return to clinic PRN    All questions were answered in detail. The patient is in full agreement with the treatment plan and will proceed accordingly.    A note notifying Dr. Geovanni Britton of my findings was sent via the electronic medical record     This note was created by combination of typed  and M-Modal dictation. Transcription and phonetic errors may be present.  If there are any questions, please contact me.

## 2020-07-20 NOTE — PROCEDURES
Large Joint Aspiration/Injection: R subacromial bursa    Date/Time: 7/20/2020 10:00 AM  Performed by: Caridad Agudelo MD  Authorized by: Caridad Agudelo MD     Consent Done?:  Yes (Written)  Indications:  Pain  Timeout: prior to procedure the correct patient, procedure, and site was verified    Prep: patient was prepped and draped in usual sterile fashion      Local anesthesia used?: Yes    Local anesthetic:  Topical anesthetic    Details:  Needle Size:  22 G  Approach:  Posterior  Location:  Shoulder  Site:  R subacromial bursa  Medications:  5 mL lidocaine (PF) 10 mg/ml (1%) 10 mg/mL (1 %); 40 mg triamcinolone acetonide 40 mg/mL  Patient tolerance:  Patient tolerated the procedure well with no immediate complications

## 2020-07-22 PROBLEM — M75.31 CALCIFIC TENDINITIS OF RIGHT SHOULDER: Status: ACTIVE | Noted: 2020-07-22

## 2020-07-24 ENCOUNTER — OFFICE VISIT (OUTPATIENT)
Dept: FAMILY MEDICINE | Facility: CLINIC | Age: 45
End: 2020-07-24
Payer: MEDICAID

## 2020-07-24 VITALS
HEART RATE: 83 BPM | HEIGHT: 60 IN | BODY MASS INDEX: 44.54 KG/M2 | TEMPERATURE: 97 F | SYSTOLIC BLOOD PRESSURE: 134 MMHG | OXYGEN SATURATION: 99 % | WEIGHT: 226.88 LBS | DIASTOLIC BLOOD PRESSURE: 86 MMHG

## 2020-07-24 DIAGNOSIS — I10 ESSENTIAL HYPERTENSION: Primary | ICD-10-CM

## 2020-07-24 DIAGNOSIS — T78.3XXS ANGIOEDEMA, SEQUELA: ICD-10-CM

## 2020-07-24 DIAGNOSIS — M75.81 TENDINITIS OF BOTH ROTATOR CUFFS: ICD-10-CM

## 2020-07-24 DIAGNOSIS — E66.01 MORBID OBESITY: ICD-10-CM

## 2020-07-24 DIAGNOSIS — F41.9 ANXIETY: ICD-10-CM

## 2020-07-24 DIAGNOSIS — M75.82 TENDINITIS OF BOTH ROTATOR CUFFS: ICD-10-CM

## 2020-07-24 DIAGNOSIS — Z11.59 NEED FOR HEPATITIS C SCREENING TEST: ICD-10-CM

## 2020-07-24 PROCEDURE — 99214 OFFICE O/P EST MOD 30 MIN: CPT | Mod: S$PBB,,, | Performed by: INTERNAL MEDICINE

## 2020-07-24 PROCEDURE — 99214 PR OFFICE/OUTPT VISIT, EST, LEVL IV, 30-39 MIN: ICD-10-PCS | Mod: S$PBB,,, | Performed by: INTERNAL MEDICINE

## 2020-07-24 PROCEDURE — 99213 OFFICE O/P EST LOW 20 MIN: CPT | Mod: PBBFAC,PO | Performed by: INTERNAL MEDICINE

## 2020-07-24 PROCEDURE — 99999 PR PBB SHADOW E&M-EST. PATIENT-LVL III: ICD-10-PCS | Mod: PBBFAC,,, | Performed by: INTERNAL MEDICINE

## 2020-07-24 PROCEDURE — 99999 PR PBB SHADOW E&M-EST. PATIENT-LVL III: CPT | Mod: PBBFAC,,, | Performed by: INTERNAL MEDICINE

## 2020-08-21 ENCOUNTER — OFFICE VISIT (OUTPATIENT)
Dept: ALLERGY | Facility: CLINIC | Age: 45
End: 2020-08-21
Payer: MEDICAID

## 2020-08-21 ENCOUNTER — PATIENT OUTREACH (OUTPATIENT)
Dept: ADMINISTRATIVE | Facility: OTHER | Age: 45
End: 2020-08-21

## 2020-08-21 ENCOUNTER — LAB VISIT (OUTPATIENT)
Dept: LAB | Facility: HOSPITAL | Age: 45
End: 2020-08-21
Attending: STUDENT IN AN ORGANIZED HEALTH CARE EDUCATION/TRAINING PROGRAM
Payer: MEDICAID

## 2020-08-21 VITALS — BODY MASS INDEX: 44.63 KG/M2 | TEMPERATURE: 99 F | HEIGHT: 60 IN | WEIGHT: 227.31 LBS

## 2020-08-21 DIAGNOSIS — T78.3XXS ANGIOEDEMA, SEQUELA: ICD-10-CM

## 2020-08-21 LAB
25(OH)D3+25(OH)D2 SERPL-MCNC: 10 NG/ML (ref 30–96)
C4 SERPL-MCNC: 37 MG/DL (ref 11–44)
ERYTHROCYTE [DISTWIDTH] IN BLOOD BY AUTOMATED COUNT: 19.8 % (ref 11.5–14.5)
HCT VFR BLD AUTO: 34.8 % (ref 37–48.5)
HGB BLD-MCNC: 10.3 G/DL (ref 12–16)
IMM GRANULOCYTES # BLD AUTO: 0.04 K/UL (ref 0–0.04)
MCH RBC QN AUTO: 21.5 PG (ref 27–31)
MCHC RBC AUTO-ENTMCNC: 29.6 G/DL (ref 32–36)
MCV RBC AUTO: 73 FL (ref 82–98)
NEUTROPHILS # BLD AUTO: 6.9 K/UL (ref 1.8–7.7)
PLATELET # BLD AUTO: 318 K/UL (ref 150–350)
PMV BLD AUTO: 10 FL (ref 9.2–12.9)
RBC # BLD AUTO: 4.79 M/UL (ref 4–5.4)
TSH SERPL DL<=0.005 MIU/L-ACNC: 0.65 UIU/ML (ref 0.4–4)
WBC # BLD AUTO: 9.02 K/UL (ref 3.9–12.7)

## 2020-08-21 PROCEDURE — 88184 FLOWCYTOMETRY/ TC 1 MARKER: CPT

## 2020-08-21 PROCEDURE — 86003 ALLG SPEC IGE CRUDE XTRC EA: CPT

## 2020-08-21 PROCEDURE — 82785 ASSAY OF IGE: CPT

## 2020-08-21 PROCEDURE — 99204 OFFICE O/P NEW MOD 45 MIN: CPT | Mod: S$PBB,,, | Performed by: STUDENT IN AN ORGANIZED HEALTH CARE EDUCATION/TRAINING PROGRAM

## 2020-08-21 PROCEDURE — 84165 PROTEIN E-PHORESIS SERUM: CPT

## 2020-08-21 PROCEDURE — 85027 COMPLETE CBC AUTOMATED: CPT

## 2020-08-21 PROCEDURE — 86376 MICROSOMAL ANTIBODY EACH: CPT

## 2020-08-21 PROCEDURE — 86160 COMPLEMENT ANTIGEN: CPT

## 2020-08-21 PROCEDURE — 99999 PR PBB SHADOW E&M-EST. PATIENT-LVL III: CPT | Mod: PBBFAC,,, | Performed by: STUDENT IN AN ORGANIZED HEALTH CARE EDUCATION/TRAINING PROGRAM

## 2020-08-21 PROCEDURE — 99999 PR PBB SHADOW E&M-EST. PATIENT-LVL III: ICD-10-PCS | Mod: PBBFAC,,, | Performed by: STUDENT IN AN ORGANIZED HEALTH CARE EDUCATION/TRAINING PROGRAM

## 2020-08-21 PROCEDURE — 83520 IMMUNOASSAY QUANT NOS NONAB: CPT

## 2020-08-21 PROCEDURE — 36415 COLL VENOUS BLD VENIPUNCTURE: CPT | Mod: PO

## 2020-08-21 PROCEDURE — 84165 PROTEIN E-PHORESIS SERUM: CPT | Mod: 26,,, | Performed by: PATHOLOGY

## 2020-08-21 PROCEDURE — 99213 OFFICE O/P EST LOW 20 MIN: CPT | Mod: PBBFAC,PO,25 | Performed by: STUDENT IN AN ORGANIZED HEALTH CARE EDUCATION/TRAINING PROGRAM

## 2020-08-21 PROCEDURE — 86800 THYROGLOBULIN ANTIBODY: CPT

## 2020-08-21 PROCEDURE — 82306 VITAMIN D 25 HYDROXY: CPT

## 2020-08-21 PROCEDURE — 84165 PATHOLOGIST INTERPRETATION SPE: ICD-10-PCS | Mod: 26,,, | Performed by: PATHOLOGY

## 2020-08-21 PROCEDURE — 86003 ALLG SPEC IGE CRUDE XTRC EA: CPT | Mod: 59

## 2020-08-21 PROCEDURE — 86161 COMPLEMENT/FUNCTION ACTIVITY: CPT

## 2020-08-21 PROCEDURE — 84443 ASSAY THYROID STIM HORMONE: CPT

## 2020-08-21 PROCEDURE — 99204 PR OFFICE/OUTPT VISIT, NEW, LEVL IV, 45-59 MIN: ICD-10-PCS | Mod: S$PBB,,, | Performed by: STUDENT IN AN ORGANIZED HEALTH CARE EDUCATION/TRAINING PROGRAM

## 2020-08-21 NOTE — PROGRESS NOTES
Health Maintenance Due   Topic Date Due    Hepatitis C Screening  1975    TETANUS VACCINE  09/21/1995     Updates were requested from care everywhere.  Chart was reviewed for overdue Proactive [facility] Encounters (YONNY) topics (CRS, Breast Cancer Screening, Eye exam)  Health Maintenance has been updated.  LINKS immunization registry triggered.  Immunizations were reconciled.

## 2020-08-21 NOTE — PATIENT INSTRUCTIONS
Testing  Blood work for allergy testing and internal diseases today       Check [facility] in one week for results or call [phone]       Contact me with questions or concerns       I will contact you if anything needs immediate attention.          Treatment    Take Aleve for pain           Contact me via [facility] if you have any more swelling episodes.      Follow up by video in 2 weeks.

## 2020-08-21 NOTE — LETTER
August 21, 2020      Geovanni Britton MD  [address]  [place] [address]           [facility]  [address]  [place] [address]  Phone: [phone]          Patient: Asha Paige   MR Number: [id number]   YOB: 1975   Date of Visit: 8/21/2020       Dear Dr. Geovanni Britton:    Thank you for referring Asha Paige to me for evaluation. Attached you will find relevant portions of my assessment and plan of care.    If you have questions, please do not hesitate to call me. I look forward to following Asha Paige along with you.    Sincerely,    Anabelle Esposito MD    Enclosure  CC:  No Recipients    If you would like to receive this communication electronically, please contact [email or web address] or [phone] to request more information on [employer] Link access.    For providers and/or their staff who would like to refer a patient to [facility], please contact us through our one-stop-shop provider referral line, [facility], at [phone].    If you feel you have received this communication in error or would no longer like to receive these types of communications, please e-mail [email or web address]

## 2020-08-21 NOTE — PROGRESS NOTES
"Allergy Clinic Note  [facility]    Subjective:      Patient ID: Asha Paige is a 44 y.o. female.    Chief Complaint: Allergic Reaction (possible drug reaction to Motrin )      Referring Provider: Geovanni Britton    History of Present Illness:  44-year-old female referred from primary care for evaluation of recurrent angioedema for approximately the last 4 years.  She is here alone, and history is difficult.     patient describes lip swelling consistent with angioedema and lasting less than 24 hr.  She also reports some episodes of "knots" on her forehead.  These consist of flesh-colored raised, squeaks she, minimally pruritic nodules.  She denies hives or other problems with her skin.  She also denies associated abdominal pain or diarrhea.    She reports 5 or 6 episodes over approximately the last 4 years.  She says all but 1 have occurred 1st thing in the morning.  She attributes them to Motrin because she  Took Motrin at bedtime the night before.   She reports on 1 occasion she had symptoms within an  Hr of taking Motrin.   She suspects that this symptoms occur in her sleep but that she does not notice them until morning.     patient states that is the since she stopped taking Motrin she has taken Aleve without difficulty.      She has no other allergic syndromes.   Family history is negative for angioedema     in the last 6 months, client...  Denies fever, shakes, or chills  Denies change in weight, appetite, or energy level   admits brittle hair on the left side of her head with slower growth  Denies change in the texture of her skin, or nails  Denies change in the appearance of urine or stool  Denies vomiting, diarrhea, constipation, or abdominal pain  Denies abnormal bleeding  Denies any change in her frequent aches and pains  Denies any unusual moles     Re cancer screening:   mammogram 2020   Pap smear up-to-date per epic    No colon cancer screening yet    Additional History:   Past medical " history is significant for  Hypertension treated with a beta-blocker.  No Hx of ENT surgery.  Family history is significant for  Negative for angioedema and asthma.  Client  reports that she has never smoked. She has never used smokeless tobacco.  Exposures are notable for  A pet dog and suspected mold in her apartment.  No exposure to  Passive smoke or unusual exposures.     Patient Active Problem List   Diagnosis    History of pulmonary embolism    Hypertension    AC joint arthropathy    Class 3 severe obesity with serious comorbidity and body mass index (BMI) of 40.0 to 44.9 in adult    Calcific tendinitis of right shoulder     Current Outpatient Medications on File Prior to Visit   Medication Sig Dispense Refill    metoprolol succinate (TOPROL-XL) 100 MG 24 hr tablet TAKE 1 TABLET BY MOUTH ONCE DAILY 90 tablet 0    cetirizine (ZYRTEC) 10 MG tablet Take 1 tablet (10 mg total) by mouth once daily. 30 tablet 2    [DISCONTINUED] cloNIDine 0.2 mg/24 hr td ptwk (CATAPRES) 0.2 mg/24 hr Place 1 patch onto the skin every 7 days. (Patient not taking: Reported on 7/24/2020) 4 patch 1    [DISCONTINUED] tiZANidine (ZANAFLEX) 2 MG tablet Take 1-2 tablets three times daily as needed for pain (Patient not taking: Reported on 7/24/2020) 30 tablet 0     No current facility-administered medications on file prior to visit.          Review of Systems   Constitutional: Negative for chills, fever and weight loss.   HENT: Negative for ear discharge and nosebleeds.    Eyes: Negative for discharge and redness.   Respiratory: Negative for hemoptysis, sputum production, shortness of breath, wheezing and stridor.    Cardiovascular: Negative for chest pain and palpitations.   Gastrointestinal: Negative for abdominal pain, blood in stool, melena and vomiting.   Genitourinary: Negative for dysuria and hematuria.   Skin: Negative for itching and rash.   Neurological: Negative for seizures and loss of consciousness.    Endo/Heme/Allergies: Negative for polydipsia. Does not bruise/bleed easily.       Objective:   Temp 99.2 °F (37.3 °C) (Temporal)   Ht 5' (1.524 m)   Wt 103.1 kg (227 lb 4.7 oz)   BMI 44.39 kg/m²       Physical Exam   Constitutional: She is well-developed, well-nourished, and in no distress.   HENT:   Head: Normocephalic and atraumatic.   Nose: Nose normal.   Eyes: Conjunctivae are normal. Right eye exhibits no discharge. Left eye exhibits no discharge.   Neck: Neck supple.   Cardiovascular: Normal rate, regular rhythm and intact distal pulses.   Pulmonary/Chest: Effort normal. No stridor. No respiratory distress.   Abdominal: She exhibits no distension.   Musculoskeletal:         General: No deformity or edema.   Neurological: She is alert. GCS score is 15.   Skin: No rash noted. No erythema.   Psychiatric: Memory and affect normal.       Data:       Assessment:     1. Angioedema, sequela        Plan:     Medical decision making:   Patient is presenting with occasional episodes of lip and face angioedema.  There is no associated urticaria.  There is no evidence of anaphylaxis.    I doubt whether the etiology is Motrin however, this is a fixed belief.  Patient declines challenge at this time.  She does agree to testing for other potential etiologies.  I plan to screen for  Selected food and aero allergens by the Immunocap method as well as screening for thyroid disease, CH1 inhibitor disease etc.       Asha was seen today for allergic reaction.    Diagnoses and all orders for this visit:    Angioedema, sequela  -     Ambulatory referral/consult to Allergy  -     TSH; Future  -     Anti-thyroglobulin Antibody Level; Future  -     Antimicrosomal Antibody Level; Future  -     Anti-IgE Receptor Antibody Level; Future  -     Vitamin D Level; Future  -     Serum Tryptase; Future  -     Serum Protein Electrophoresis; Future  -     CBC; Future  -     IgE; Future  -     Dermatophagoides [place]; Future  -      Dermatophagoides Pteronyssinus; Future  -     [place]; Future  -     Santy; Future  -     [place]; Future  -     English Plantain; Future  -     Oak Pecan; Future  -     Pecan; Future  -     Ragweed; Future  -     Alternaria; Future  -     Aspergillus; Future  -     Cat; Future  -     Cockroach; Future  -     Dog; Future  -     C1 Esterase Inhibitor, Functional; Future  -     C4 complement; Future        Patient Instructions   Testing  Blood work for allergy testing and internal diseases today       Check [facility] in one week for results or call [phone]       Contact me with questions or concerns       I will contact you if anything needs immediate attention.          Treatment    Take Aleve for pain           Contact me via MyChart if you have any more swelling episodes.      Follow up by video in 2 weeks.        Follow up in about 2 weeks (around 9/4/2020) for F/u labs.    Anabelle Esposito MD

## 2020-08-24 DIAGNOSIS — E55.9 VITAMIN D DEFICIENCY: Primary | ICD-10-CM

## 2020-08-24 LAB
ALBUMIN SERPL ELPH-MCNC: 3.16 G/DL (ref 3.35–5.55)
ALPHA1 GLOB SERPL ELPH-MCNC: 0.45 G/DL (ref 0.17–0.41)
ALPHA2 GLOB SERPL ELPH-MCNC: 0.79 G/DL (ref 0.43–0.99)
B-GLOBULIN SERPL ELPH-MCNC: 0.98 G/DL (ref 0.5–1.1)
GAMMA GLOB SERPL ELPH-MCNC: 1.72 G/DL (ref 0.67–1.58)
IGE SERPL-ACNC: 275 IU/ML (ref 0–100)
PROT SERPL-MCNC: 7.1 G/DL (ref 6–8.4)
THYROGLOB AB SERPL IA-ACNC: <4 IU/ML (ref 0–3.9)
THYROPEROXIDASE IGG SERPL-ACNC: <6 IU/ML

## 2020-08-24 RX ORDER — ERGOCALCIFEROL 1.25 MG/1
50000 CAPSULE ORAL
Qty: 8 CAPSULE | Refills: 0 | Status: SHIPPED | OUTPATIENT
Start: 2020-08-24 | End: 2020-09-03 | Stop reason: SDUPTHER

## 2020-08-25 LAB
A ALTERNATA IGE QN: <0.1 KU/L
A FUMIGATUS IGE QN: <0.1 KU/L
BERMUDA GRASS IGE QN: 7.12 KU/L
CAT DANDER IGE QN: 1.75 KU/L
CEDAR IGE QN: <0.1 KU/L
D FARINAE IGE QN: 74.9 KU/L
D PTERONYSS IGE QN: 49.6 KU/L
DEPRECATED A ALTERNATA IGE RAST QL: NORMAL
DEPRECATED A FUMIGATUS IGE RAST QL: NORMAL
DEPRECATED BERMUDA GRASS IGE RAST QL: ABNORMAL
DEPRECATED CAT DANDER IGE RAST QL: ABNORMAL
DEPRECATED CEDAR IGE RAST QL: NORMAL
DEPRECATED D FARINAE IGE RAST QL: ABNORMAL
DEPRECATED D PTERONYSS IGE RAST QL: ABNORMAL
DEPRECATED DOG DANDER IGE RAST QL: ABNORMAL
DEPRECATED ENGL PLANTAIN IGE RAST QL: ABNORMAL
DEPRECATED PECAN/HICK TREE IGE RAST QL: NORMAL
DEPRECATED ROACH IGE RAST QL: ABNORMAL
DEPRECATED TIMOTHY IGE RAST QL: ABNORMAL
DEPRECATED WEST RAGWEED IGE RAST QL: ABNORMAL
DEPRECATED WHITE OAK IGE RAST QL: NORMAL
DOG DANDER IGE QN: 18.9 KU/L
ENGL PLANTAIN IGE QN: 0.18 KU/L
PATHOLOGIST INTERPRETATION SPE: NORMAL
PECAN/HICK TREE IGE QN: <0.1 KU/L
ROACH IGE QN: 0.49 KU/L
TIMOTHY IGE QN: 15.3 KU/L
WEST RAGWEED IGE QN: 0.21 KU/L
WHITE OAK IGE QN: <0.1 KU/L

## 2020-08-26 LAB — TRYPTASE LEVEL: 3.3 NG/ML

## 2020-08-31 LAB — C1INH FUNCTIONAL/C1INH TOTAL MFR SERPL: 78 %

## 2020-09-02 NOTE — PROGRESS NOTES
"Allergy Clinic Note  [facility]    The patient location is: [place], her car  The chief complaint leading to consultation is: F/u angioedema    Visit type: audiovisual    Face to Face time with patient: 11 min  35 minutes of total time spent on the encounter, which includes face to face time and non-face to face time preparing to see the patient (eg, review of tests), Obtaining and/or reviewing separately obtained history, Documenting clinical information in the electronic or other health record, Independently interpreting results (not separately reported) and communicating results to the patient/family/caregiver, or Care coordination (not separately reported).     Each patient to whom he or she provides medical services by telemedicine is:  (1) informed of the relationship between the physician and patient and the respective role of any other health care provider with respect to management of the patient; and (2) notified that he or she may decline to receive medical services by telemedicine and may withdraw from such care at any time.    Subjective:      Patient ID: Asha Paige is a 44 y.o. female.    Chief Complaint: No chief complaint on file.      The problem list:     Recurrent angioedema, rare episode x 4 years  (beta-blocker)    History of Present Illness:  44-year-old female referred from primary care for evaluation of recurrent angioedema for approximately the last 4 years.  She is here alone, and history is difficult.    Nasal inguinal a sewed since last visit.    No other problems or complaints.    At her initial visit client described lip swelling consistent with angioedema and lasting less than 24 hr.  She also reports some episodes of "knots" on her forehead.  These consist of flesh-colored, raised, squishy, minimally pruritic nodules.  She denies hives or other problems with her skin.  She also denies associated abdominal pain or diarrhea.    She reports 5 or 6 episodes over " approximately the last 4 years.  She says all but 1 have occurred 1st thing in the morning.  She attributes them to Motrin because she  Took Motrin at bedtime the night before.   She reports on 1 occasion she had symptoms within an  Hr of taking Motrin.   She suspects that the symptoms occur in her sleep but that she does not notice them until morning.     patient states that since she stopped taking Motrin she has taken Aleve without difficulty.      She has no other allergic syndromes.   Family history is negative for angioedema     in the last 6 months, client...  Denies fever, shakes, or chills  Denies change in weight, appetite, or energy level   admits brittle hair on the left side of her head with slower growth  Denies change in the texture of her skin, or nails  Denies change in the appearance of urine or stool  Denies vomiting, diarrhea, constipation, or abdominal pain  Denies abnormal bleeding  Denies any change in her frequent aches and pains  Denies any unusual moles     Re cancer screening:   mammogram 2020   Pap smear up-to-date per epic    No colon cancer screening yet    Additional History:   Interval history is unremarkable.  Past medical history is significant for  Hypertension treated with a beta-blocker.  No Hx of ENT surgery.  Family history is significant for  Negative for angioedema and asthma.  Client  reports that she has never smoked. She has never used smokeless tobacco.  Exposures are notable for  A pet dog and suspected mold in her apartment.  No exposure to  Passive smoke or unusual exposures.     Patient Active Problem List   Diagnosis    History of pulmonary embolism    Hypertension    AC joint arthropathy    Class 3 severe obesity with serious comorbidity and body mass index (BMI) of 40.0 to 44.9 in adult    Calcific tendinitis of right shoulder     Current Outpatient Medications on File Prior to Visit   Medication Sig Dispense Refill    cetirizine (ZYRTEC) 10 MG tablet Take 1  tablet (10 mg total) by mouth once daily. 30 tablet 2    metoprolol succinate (TOPROL-XL) 100 MG 24 hr tablet TAKE 1 TABLET BY MOUTH ONCE DAILY 90 tablet 0    [DISCONTINUED] ergocalciferol (ERGOCALCIFEROL) 50,000 unit Cap Take 1 capsule (50,000 Units total) by mouth every 7 days. 8 capsule 0     No current facility-administered medications on file prior to visit.          Review of Systems   Constitutional: Negative for chills and fever.   HENT: Negative for ear discharge and nosebleeds.    Eyes: Negative for discharge and redness.   Respiratory: Negative for hemoptysis, sputum production, shortness of breath, wheezing and stridor.    Cardiovascular: Negative for chest pain and palpitations.   Gastrointestinal: Negative for blood in stool, melena and vomiting.   Genitourinary: Negative for dysuria and hematuria.   Skin: Negative for itching and rash.   Neurological: Negative for seizures and loss of consciousness.   Endo/Heme/Allergies: Positive for environmental allergies. Does not bruise/bleed easily.       Objective:   There were no vitals taken for this visit.      Physical Exam    Data:   Aeroallergen skin testing by the Immunocap method (08/21/2020) showed strongly positive reactions to dust mite and her pet dog.   Class V   dust mite (Df)   Class IV  dust mite open (Dp), dog   Class III  Bermuda grass, Santy grass   Class II   Cat  All other aeroallergens less than 0.35 KU/L  Total serum IgE 275    Labs (08/21/2020)  Normal C4 and normal C1 esterase inhibitor function  Low vitamin-D 10  Normal TSH and normal anti thyroid antibodies  SPEP without monoclonal spike  Normal tryptase  Basophil activation pending    Assessment:     1. Recurrent angioedema    2. Vitamin D deficiency        Plan:     Medical decision making:   Patient is presenting with occasional episodes of lip and face angioedema.  There is no associated urticaria.  There is no evidence of anaphylaxis.    I doubt whether the etiology is Motrin  however, this is a fixed belief.  Patient declines challenge at this time.  Testing for other etiologies ruled out C1 esterase inhibitor disease, autoimmune subtype, leukemia, kidney disease, liver disease, thyroid disease.  Labs were notable for a microcytic anemia which has been present for at least the last 10 years  as well as for all of low vitamin-D.  Aeroallergen testing showed strongly positive reactions to dust mite and dogs.  She is also allergic to grass and cats.  Diagnostically she continues to decline a Motrin challenge.  Therapeutically I recommend vitamin-D replacement at a dose [place] 57781 units weekly for the next 8 weeks.  For her dust allergy I recommended dust dust avoidance measures with attention to the bedroom (please see patient instructions.) For her allergy to her pet dog, I recommended a dog free bedroom.  Diagnoses and all orders for this visit:    Recurrent angioedema    Vitamin D deficiency  -     ergocalciferol (ERGOCALCIFEROL) 50,000 unit Cap; Take 1 capsule (50,000 Units total) by mouth every 7 days.        Patient Instructions     Your labs showed the following  1.  Low vitamin D  2.  Anemia, unchanged for at least the last ten years  3.  Allergies:       Highly allergic to dust mites and dogs       Also allergic to grass pollen and cats.    Recommendations    Vitamin D supplements 10,000 units once a week for 8 weeks    Anemia - Discuss with Dr Britton at next visit.  It may be something you were born with that he already knows about.    Environmental controls for allergies    Dust:  avoidance measures in bedroom:  Dust proof covers on all pillows that stay on the bed at night.  No stuffed animals on the bed at night.  No feathers or down on the bed  Wash bedding in hot water  Take a pillow cover (or your pillow) for vacations  Consider Dust proof cover on mattress.  Consider dehumidifier    (Best source for pillow covers is online at [employer] ([employer]).  Bed, Bath  sand Beyond is an ok substitute.  Do not recommend those sold at [employer] or [employer].)    For dog allergy, best is to not have a dog or to have an outdoor dog.  Less effective alternatives are  1.  Dog free bedroom with HEPA filter in bedroom  2.  Allergy shots to dogs and other allergens.    For grass allergy, no mowing grass    Continue to avoid cats.          Follow up in about 1 year (around 9/3/2021).    Anabelle Esposito MD

## 2020-09-03 ENCOUNTER — OFFICE VISIT (OUTPATIENT)
Dept: ALLERGY | Facility: CLINIC | Age: 45
End: 2020-09-03
Payer: MEDICAID

## 2020-09-03 DIAGNOSIS — T78.3XXD ANGIOEDEMA, SUBSEQUENT ENCOUNTER: Primary | ICD-10-CM

## 2020-09-03 DIAGNOSIS — E55.9 VITAMIN D DEFICIENCY: ICD-10-CM

## 2020-09-03 PROCEDURE — 99214 OFFICE O/P EST MOD 30 MIN: CPT | Mod: 95,,, | Performed by: STUDENT IN AN ORGANIZED HEALTH CARE EDUCATION/TRAINING PROGRAM

## 2020-09-03 PROCEDURE — 99214 PR OFFICE/OUTPT VISIT, EST, LEVL IV, 30-39 MIN: ICD-10-PCS | Mod: 95,,, | Performed by: STUDENT IN AN ORGANIZED HEALTH CARE EDUCATION/TRAINING PROGRAM

## 2020-09-03 RX ORDER — ERGOCALCIFEROL 1.25 MG/1
50000 CAPSULE ORAL
Qty: 8 CAPSULE | Refills: 0 | Status: SHIPPED | OUTPATIENT
Start: 2020-09-03 | End: 2020-10-29

## 2020-09-03 NOTE — PATIENT INSTRUCTIONS
Your labs showed the following  1.  Low vitamin D  2.  Anemia, unchanged for at least the last ten years  3.  Allergies:       Highly allergic to dust mites and dogs       Also allergic to grass pollen and cats.    Recommendations    Vitamin D supplements 10,000 units once a week for 8 weeks    Anemia - Discuss with Dr Britton at next visit.  It may be something you were born with that he already knows about.    Environmental controls for allergies    Dust:  avoidance measures in bedroom:  Dust proof covers on all pillows that stay on the bed at night.  No stuffed animals on the bed at night.  No feathers or down on the bed  Wash bedding in hot water  Take a pillow cover (or your pillow) for vacations  Consider Dust proof cover on mattress.  Consider dehumidifier    (Best source for pillow covers is online at [employer] ([employer]).  Bed, Bath sand Beyond is an ok substitute.  Do not recommend those sold at [employer] or [employer].)    For dog allergy, best is to not have a dog or to have an outdoor dog.  Less effective alternatives are  1.  Dog free bedroom with HEPA filter in bedroom  2.  Allergy shots to dogs and other allergens.    For grass allergy, no mowing grass    Continue to avoid cats.

## 2020-09-06 LAB
CIU ASSOCIATED BASOPHIL ACTIVATION: 1.5 % (ref 0–12)
IGE RECEPTOR ANTIBODY: NORMAL

## 2020-09-08 ENCOUNTER — TELEPHONE (OUTPATIENT)
Dept: FAMILY MEDICINE | Facility: CLINIC | Age: 45
End: 2020-09-08

## 2020-09-08 NOTE — TELEPHONE ENCOUNTER
----- Message from Harmony uD sent at 9/8/2020 11:10 AM CDT -----  Contact: Patient [phone]  .Name of Caller Patient   Reason for Visit/Symptoms Swollen lip  Best Contact Number or Confirm if Mychart Preferred [phone]  Preferred Date/Time of Appointment Today, 09-08-20   Interested in Virtual Visit (yes/no) Yes

## 2020-09-09 ENCOUNTER — TELEPHONE (OUTPATIENT)
Dept: FAMILY MEDICINE | Facility: CLINIC | Age: 45
End: 2020-09-09

## 2020-09-09 NOTE — TELEPHONE ENCOUNTER
----- Message from Arleen Hurd sent at 9/9/2020  9:26 AM CDT -----  Type: Patient Call Back    Who called: Self     What is the request in detail: calling in regards to status on request for a earlier appt.     Can the clinic reply by [facility]? Call back     Would the patient rather a call back or a response via [facility]? Call back     Best call back number: [phone]

## 2020-09-09 NOTE — TELEPHONE ENCOUNTER
Patient with recent evaluation with Allergy (Dr Brown) within the past week - she discussed possible triggers and potential treatments. At this time it appears the recommendation was for a Motrin challenge and environmental trigger control. Patient declined motrin challenge. No current indication for urgent evaluation unless needs to see Allergy again (since the recommendation is that she undergo additional testing). I will see patient on 9/23

## 2020-09-11 ENCOUNTER — OFFICE VISIT (OUTPATIENT)
Dept: FAMILY MEDICINE | Facility: CLINIC | Age: 45
End: 2020-09-11
Payer: MEDICAID

## 2020-09-11 VITALS
HEART RATE: 89 BPM | BODY MASS INDEX: 44.49 KG/M2 | WEIGHT: 226.63 LBS | SYSTOLIC BLOOD PRESSURE: 138 MMHG | HEIGHT: 60 IN | TEMPERATURE: 98 F | OXYGEN SATURATION: 98 % | DIASTOLIC BLOOD PRESSURE: 86 MMHG

## 2020-09-11 DIAGNOSIS — E66.01 CLASS 3 SEVERE OBESITY DUE TO EXCESS CALORIES WITH SERIOUS COMORBIDITY AND BODY MASS INDEX (BMI) OF 40.0 TO 44.9 IN ADULT: ICD-10-CM

## 2020-09-11 DIAGNOSIS — T78.3XXS ANGIOEDEMA, SEQUELA: Primary | ICD-10-CM

## 2020-09-11 DIAGNOSIS — F41.9 ANXIETY: ICD-10-CM

## 2020-09-11 DIAGNOSIS — I10 ESSENTIAL HYPERTENSION: ICD-10-CM

## 2020-09-11 DIAGNOSIS — L50.9 URTICARIA: ICD-10-CM

## 2020-09-11 PROCEDURE — 99213 OFFICE O/P EST LOW 20 MIN: CPT | Mod: PBBFAC,PO | Performed by: INTERNAL MEDICINE

## 2020-09-11 PROCEDURE — 99214 OFFICE O/P EST MOD 30 MIN: CPT | Mod: S$PBB,,, | Performed by: INTERNAL MEDICINE

## 2020-09-11 PROCEDURE — 99214 PR OFFICE/OUTPT VISIT, EST, LEVL IV, 30-39 MIN: ICD-10-PCS | Mod: S$PBB,,, | Performed by: INTERNAL MEDICINE

## 2020-09-11 PROCEDURE — 99999 PR PBB SHADOW E&M-EST. PATIENT-LVL III: ICD-10-PCS | Mod: PBBFAC,,, | Performed by: INTERNAL MEDICINE

## 2020-09-11 PROCEDURE — 99999 PR PBB SHADOW E&M-EST. PATIENT-LVL III: CPT | Mod: PBBFAC,,, | Performed by: INTERNAL MEDICINE

## 2020-09-11 RX ORDER — TRIAMCINOLONE ACETONIDE 1 MG/G
CREAM TOPICAL 2 TIMES DAILY
Qty: 80 G | Refills: 0 | Status: SHIPPED | OUTPATIENT
Start: 2020-09-11 | End: 2024-03-12

## 2020-09-11 RX ORDER — ESCITALOPRAM OXALATE 10 MG/1
10 TABLET ORAL NIGHTLY
Qty: 90 TABLET | Refills: 0 | Status: SHIPPED | OUTPATIENT
Start: 2020-09-11 | End: 2020-12-07 | Stop reason: SDUPTHER

## 2020-09-11 NOTE — PROGRESS NOTES
Subjective:     Chief Complaint  Chief Complaint   Patient presents with    Allergic Reaction     swollen lips        HPI  Asha Paige is a 44 y.o. female with medical diagnoses as listed in the medical history and problem list that presents for above complaint(s).    Lip swelling  Episodic pain and swelling of her lips in addition to urticaria  Evaluated per Allergy recently - Testing for other etiologies ruled out C1 esterase inhibitor disease, autoimmune subtype, leukemia, kidney disease, liver disease, thyroid disease.  Labs were notable for a microcytic anemia which has been present for at least the last 10 years  as well as for all of low vitamin-D.  Aeroallergen testing showed strongly positive reactions to dust mite and dogs.      States she has had her dog (a schnauzer) for over 10 years  She stopped taking her metoprolol for the past 2 days - still experienced swelling on first day    Would like to have medication for anxiety  A few nights ago with severe anxiety at nighttime  Father is currently in the hospital    Patient Care Team:  Geovanni Britton MD as PCP - General (Internal Medicine)  Valeria Noe LPN as Licensed Practical Nurse  Valeria Noe LPN as Care Coordinator      PAST MEDICAL HISTORY:  Past Medical History:   Diagnosis Date    Allergy     Angio-edema     Eczema     History of pulmonary embolism 2005    Hypertension     Urticaria        PAST SURGICAL HISTORY:  Past Surgical History:   Procedure Laterality Date    FRACTURE SURGERY      Plate in right arm       VAGINAL DELIVERY         SOCIAL HISTORY:  Social History     Socioeconomic History    Marital status: Single     Spouse name: Not on file    Number of children: Not on file    Years of education: Not on file    Highest education level: Not on file   Occupational History    Not on file   Social Needs    Financial resource strain: Not on file    Food insecurity     Worry: Not on file     Inability: Not  on file    Transportation needs     Medical: Not on file     Non-medical: Not on file   Tobacco Use    Smoking status: Never Smoker    Smokeless tobacco: Never Used   Substance and Sexual Activity    Alcohol use: Yes     Frequency: Never     Comment: Social     Drug use: No    Sexual activity: Yes     Partners: Male   Lifestyle    Physical activity     Days per week: Not on file     Minutes per session: Not on file    Stress: Not on file   Relationships    Social connections     Talks on phone: Not on file     Gets together: Not on file     Attends Hinduism service: Not on file     Active member of club or organization: Not on file     Attends meetings of clubs or organizations: Not on file     Relationship status: Not on file   Other Topics Concern    Not on file   Social History Narrative    Not on file       FAMILY HISTORY:  Family History   Problem Relation Age of Onset    Cancer Maternal Grandmother     Allergic rhinitis Mother     Breast cancer Neg Hx     Ovarian cancer Neg Hx        ALLERGIES AND MEDICATIONS: updated and reviewed.  Review of patient's allergies indicates:   Allergen Reactions    Motrin ib [ibuprofen] Swelling     Swollen lips and knot on her head     Lisinopril Other (See Comments)     Angioedema     Current Outpatient Medications   Medication Sig Dispense Refill    cetirizine (ZYRTEC) 10 MG tablet Take 1 tablet (10 mg total) by mouth once daily. 30 tablet 2    ergocalciferol (ERGOCALCIFEROL) 50,000 unit Cap Take 1 capsule (50,000 Units total) by mouth every 7 days. 8 capsule 0    metoprolol succinate (TOPROL-XL) 100 MG 24 hr tablet TAKE 1 TABLET BY MOUTH ONCE DAILY 90 tablet 0    escitalopram oxalate (LEXAPRO) 10 MG tablet Take 1 tablet (10 mg total) by mouth nightly. 90 tablet 0    triamcinolone acetonide 0.1% (KENALOG) 0.1 % cream Apply topically 2 (two) times daily. Apply to affected areas of skin for 14 days 80 g 0     No current facility-administered medications  for this visit.          ROS:  Review of Systems   HENT: Positive for facial swelling (lips).    Skin: Positive for rash (hives).   Allergic/Immunologic: Positive for environmental allergies.       Objective:       Physical Exam  Vitals:    09/11/20 0857   BP: 138/86   BP Location: Left arm   Patient Position: Sitting   BP Method: Large (Manual)   Pulse: 89   Temp: 98.3 °F (36.8 °C)   TempSrc: Oral   SpO2: 98%   Weight: 102.8 kg (226 lb 10.1 oz)   Height: 5' (1.524 m)    Body mass index is 44.26 kg/m².  Weight: 102.8 kg (226 lb 10.1 oz)   Height: 5' (152.4 cm)   Physical Exam  Vitals signs reviewed.   Constitutional:       General: She is not in acute distress.     Appearance: She is well-developed.   HENT:      Head: Normocephalic and atraumatic.   Eyes:      Conjunctiva/sclera: Conjunctivae normal.      Pupils: Pupils are equal, round, and reactive to light.   Neck:      Musculoskeletal: Normal range of motion and neck supple.      Thyroid: No thyromegaly.   Cardiovascular:      Rate and Rhythm: Normal rate.   Pulmonary:      Effort: Pulmonary effort is normal.   Musculoskeletal:         General: No deformity.   Lymphadenopathy:      Cervical: No cervical adenopathy.   Skin:     General: Skin is warm and dry.      Findings: Rash present.   Neurological:      Mental Status: She is alert.      Cranial Nerves: No cranial nerve deficit.   Psychiatric:         Behavior: Behavior normal.             Assessment:     1. Angioedema, sequela    2. Urticaria    3. Anxiety    4. Essential hypertension    5. Class 3 severe obesity due to excess calories with serious comorbidity and body mass index (BMI) of 40.0 to 44.9 in adult      Plan:     Asha was seen today for allergic reaction.    Diagnoses and all orders for this visit:    Angioedema, sequela  Urticaria  Discussed utilizing cetirizine daily to prevention of flares  Reviewed Allergy recommendations  May consider specific food allergen testing in the future   -      triamcinolone acetonide 0.1% (KENALOG) 0.1 % cream; Apply topically 2 (two) times daily. Apply to affected areas of skin for 14 days    Anxiety  Patient with recent psychosocial stressors as triggers  Will restart Lexapro presently for symptoms  -     escitalopram oxalate (LEXAPRO) 10 MG tablet; Take 1 tablet (10 mg total) by mouth nightly.    Essential hypertension  On metoprolol - BP controlled presently - reviewed anti-hypertensive regimen - continue current therapy      Class 3 severe obesity due to excess calories with serious comorbidity and body mass index (BMI) of 40.0 to 44.9 in adult  Body mass index is 44.26 kg/m².  We discussed dietary interventions for the management of weight and reduction of risk for chronic metabolic disease          Health Maintenance       Date Due Completion Date    Hepatitis C Screening 1975 ---    TETANUS VACCINE 09/21/1995 ---    Influenza Vaccine (1) 08/01/2020 ---    Cervical Cancer Screening 01/25/2022 1/25/2019    Mammogram 03/10/2022 3/10/2020            Health Maintenance reviewed and addressed as per orders    No follow-ups on file.    The patient expressed understanding and no barriers to adherence were identified.     1. The patient indicates understanding of these issues and agrees with the plan. Brief care plan is updated and reviewed with the patient as applicable.     2. The patient is given an After Visit Summary that lists all medications with directions, allergies, orders placed during this encounter and follow-up instructions.     3. I have reviewed the patient's medical information including past medical, family, and social history sections including the medications and allergies.     4. We discussed the patient's current medications. I reconciled the patient's medication list and prepared and supplied needed refills.       Geovanni Britton MD  Internal Medicine-Pediatrics

## 2020-10-16 ENCOUNTER — PATIENT MESSAGE (OUTPATIENT)
Dept: OBSTETRICS AND GYNECOLOGY | Facility: CLINIC | Age: 45
End: 2020-10-16

## 2020-11-10 ENCOUNTER — PATIENT OUTREACH (OUTPATIENT)
Dept: ADMINISTRATIVE | Facility: OTHER | Age: 45
End: 2020-11-10

## 2020-11-12 ENCOUNTER — TELEPHONE (OUTPATIENT)
Dept: ALLERGY | Facility: CLINIC | Age: 45
End: 2020-11-12

## 2020-11-12 ENCOUNTER — LAB VISIT (OUTPATIENT)
Dept: LAB | Facility: OTHER | Age: 45
End: 2020-11-12
Attending: OBSTETRICS & GYNECOLOGY
Payer: MEDICAID

## 2020-11-12 ENCOUNTER — OFFICE VISIT (OUTPATIENT)
Dept: OBSTETRICS AND GYNECOLOGY | Facility: CLINIC | Age: 45
End: 2020-11-12
Payer: MEDICAID

## 2020-11-12 VITALS
HEIGHT: 60 IN | BODY MASS INDEX: 44.58 KG/M2 | WEIGHT: 227.06 LBS | SYSTOLIC BLOOD PRESSURE: 151 MMHG | DIASTOLIC BLOOD PRESSURE: 97 MMHG

## 2020-11-12 DIAGNOSIS — N93.9 ABNORMAL UTERINE BLEEDING: ICD-10-CM

## 2020-11-12 DIAGNOSIS — E55.9 VITAMIN D DEFICIENCY: Primary | ICD-10-CM

## 2020-11-12 DIAGNOSIS — N93.9 ABNORMAL UTERINE BLEEDING: Primary | ICD-10-CM

## 2020-11-12 LAB
B-HCG UR QL: NEGATIVE
CTP QC/QA: YES
ESTRADIOL SERPL-MCNC: 354 PG/ML
FSH SERPL-ACNC: 4.4 MIU/ML
PROLACTIN SERPL IA-MCNC: 12.1 NG/ML (ref 5.2–26.5)

## 2020-11-12 PROCEDURE — 99999 PR PBB SHADOW E&M-EST. PATIENT-LVL III: CPT | Mod: PBBFAC,,, | Performed by: OBSTETRICS & GYNECOLOGY

## 2020-11-12 PROCEDURE — 84402 ASSAY OF FREE TESTOSTERONE: CPT

## 2020-11-12 PROCEDURE — 99214 PR OFFICE/OUTPT VISIT, EST, LEVL IV, 30-39 MIN: ICD-10-PCS | Mod: S$PBB,,, | Performed by: OBSTETRICS & GYNECOLOGY

## 2020-11-12 PROCEDURE — 83001 ASSAY OF GONADOTROPIN (FSH): CPT

## 2020-11-12 PROCEDURE — 99213 OFFICE O/P EST LOW 20 MIN: CPT | Mod: PBBFAC | Performed by: OBSTETRICS & GYNECOLOGY

## 2020-11-12 PROCEDURE — 99999 PR PBB SHADOW E&M-EST. PATIENT-LVL III: ICD-10-PCS | Mod: PBBFAC,,, | Performed by: OBSTETRICS & GYNECOLOGY

## 2020-11-12 PROCEDURE — 82670 ASSAY OF TOTAL ESTRADIOL: CPT

## 2020-11-12 PROCEDURE — 84146 ASSAY OF PROLACTIN: CPT

## 2020-11-12 PROCEDURE — 99214 OFFICE O/P EST MOD 30 MIN: CPT | Mod: S$PBB,,, | Performed by: OBSTETRICS & GYNECOLOGY

## 2020-11-12 RX ORDER — DOXYCYCLINE 100 MG/1
CAPSULE ORAL
COMMUNITY
Start: 2020-11-06 | End: 2024-03-29

## 2020-11-12 RX ORDER — HYDROCORTISONE 25 MG/G
OINTMENT TOPICAL
COMMUNITY
Start: 2020-11-06 | End: 2024-03-29

## 2020-11-12 NOTE — PROGRESS NOTES
CC: 46 yo here for absent cycles    HPI: She is overall well today.  Has two children, s/p  x 2. Last pap smear was in , NILM/HRHPV negative. Denies history of abnormal pap smears. Mammogram up to date, next due in 2021.     SA with male partner x 16 years. Cycles are regular, once per month. Not using contraception, sex is very infrequent. She has a history of PE.     No longer working. Weight stable. She is not exercising but is trying to eat healthy.    No cycle since August. Previously has always had regular cycles. No headaches, hot flashes, vaginal dryness. She does have some unwanted hair growth on her chin but this has always been present. No weight gain/weight loss. Some stress as her dad passed away in September from a stroke.     Past Medical History:   Diagnosis Date    Allergy     Angio-edema     Eczema     History of pulmonary embolism     Hypertension     Urticaria        Past Surgical History:   Procedure Laterality Date    FRACTURE SURGERY      Plate in right arm       VAGINAL DELIVERY         OB History        2    Para   2    Term   2            AB        Living           SAB        TAB        Ectopic        Multiple        Live Births                     Current Outpatient Medications on File Prior to Visit   Medication Sig Dispense Refill    doxycycline (VIBRAMYCIN) 100 MG Cap TK 1 C PO QD FOR ACNE      hydrocortisone 2.5 % ointment APPLY TOPICALLY TO FACE BID PRN UTD      cetirizine (ZYRTEC) 10 MG tablet Take 1 tablet (10 mg total) by mouth once daily. 30 tablet 2    escitalopram oxalate (LEXAPRO) 10 MG tablet Take 1 tablet (10 mg total) by mouth nightly. (Patient not taking: Reported on 2020) 90 tablet 0    metoprolol succinate (TOPROL-XL) 100 MG 24 hr tablet TAKE 1 TABLET BY MOUTH ONCE DAILY (Patient not taking: Reported on 2020) 90 tablet 0    triamcinolone acetonide 0.1% (KENALOG) 0.1 % cream Apply topically 2 (two) times daily. Apply  to affected areas of skin for 14 days 80 g 0     No current facility-administered medications on file prior to visit.          ROS:  GENERAL: Feeling well overall.   HEAD: Denies head injury or headache.   CHEST: Denies chest pain or shortness of breath.    ABDOMEN: No abdominal pain, .   REPRODUCTIVE: See HPI.    HEMATOLOGIC: No easy bruisability or excessive bleeding.   PSYCHIATRIC: some stress    Physical Exam:   BP (!) 151/97   Ht 5' (1.524 m)   Wt 103 kg (227 lb 1.2 oz)   LMP 2020 (Approximate)   BMI 44.35 kg/m²   General: No distress, well appearing, some terminal hair growth of chin  Heart: Regular rate  Lungs: No increased work of breathing  MS: lower extremeties symmetrical, no edema  Pelvic Exam: deferred    ASSESSMENT/PLAN: 46 yo  here for secondary amenorrhea. Previously had regular cycles. No cycles for 3 months. Could be perimenopausal. She is overall asymptomatic.     UPT negative in office    Abnormal uterine bleeding  -     POCT urine pregnancy  -     Prolactin; Future; Expected date: 2020  -     Estradiol; Future; Expected date: 2020  -     Mammo Digital Screening Bilat; Future; Expected date: 2020  -     Follicle Stimulating Hormone; Future; Expected date: 2020  -     Testosterone, free; Future; Expected date: 2020      If cycles become irregular, would recommend ultrasound and EMB    Offered flu shot, declines  Pap up to date  Reviewed contraception  Mammogram due in March, will schedule  Exercise encouraged    See me in March for WWE and mammogram. Will fu on cycles then. She knows to contact our office if has AUB in interim.        Ginny Wallace MD  Obstetrics and Gynecology  [facility]

## 2020-11-13 ENCOUNTER — PATIENT MESSAGE (OUTPATIENT)
Dept: ALLERGY | Facility: CLINIC | Age: 45
End: 2020-11-13

## 2020-11-17 LAB — TESTOST FREE SERPL-MCNC: <0.4 PG/ML

## 2020-11-24 DIAGNOSIS — I10 ESSENTIAL HYPERTENSION: Primary | ICD-10-CM

## 2020-11-24 RX ORDER — ERGOCALCIFEROL 1.25 MG/1
50000 CAPSULE ORAL
COMMUNITY
End: 2024-03-29

## 2020-11-25 ENCOUNTER — PATIENT MESSAGE (OUTPATIENT)
Dept: ALLERGY | Facility: CLINIC | Age: 45
End: 2020-11-25

## 2020-11-25 RX ORDER — ERGOCALCIFEROL 1.25 MG/1
50000 CAPSULE ORAL
OUTPATIENT
Start: 2020-11-25

## 2020-12-07 DIAGNOSIS — F41.9 ANXIETY: ICD-10-CM

## 2020-12-07 RX ORDER — ESCITALOPRAM OXALATE 10 MG/1
10 TABLET ORAL NIGHTLY
Qty: 90 TABLET | Refills: 0 | Status: SHIPPED | OUTPATIENT
Start: 2020-12-07 | End: 2021-05-03 | Stop reason: SDUPTHER

## 2021-02-17 DIAGNOSIS — I10 ESSENTIAL HYPERTENSION: ICD-10-CM

## 2021-02-17 RX ORDER — METOPROLOL SUCCINATE 100 MG/1
100 TABLET, EXTENDED RELEASE ORAL DAILY
Qty: 90 TABLET | Refills: 1 | Status: SHIPPED | OUTPATIENT
Start: 2021-02-17 | End: 2021-08-27 | Stop reason: SDUPTHER

## 2021-03-20 ENCOUNTER — PATIENT OUTREACH (OUTPATIENT)
Dept: ADMINISTRATIVE | Facility: OTHER | Age: 46
End: 2021-03-20

## 2021-04-01 ENCOUNTER — PATIENT MESSAGE (OUTPATIENT)
Dept: FAMILY MEDICINE | Facility: CLINIC | Age: 46
End: 2021-04-01

## 2021-04-01 DIAGNOSIS — F39 MOOD DISORDER: Primary | ICD-10-CM

## 2021-04-05 ENCOUNTER — TELEPHONE (OUTPATIENT)
Dept: BEHAVIORAL HEALTH | Facility: CLINIC | Age: 46
End: 2021-04-05

## 2021-04-05 ENCOUNTER — TELEPHONE (OUTPATIENT)
Dept: FAMILY MEDICINE | Facility: CLINIC | Age: 46
End: 2021-04-05

## 2021-04-05 ENCOUNTER — PATIENT MESSAGE (OUTPATIENT)
Dept: ADMINISTRATIVE | Facility: HOSPITAL | Age: 46
End: 2021-04-05

## 2021-04-06 ENCOUNTER — OFFICE VISIT (OUTPATIENT)
Dept: ORTHOPEDICS | Facility: CLINIC | Age: 46
End: 2021-04-06
Payer: MEDICAID

## 2021-04-06 VITALS
WEIGHT: 226 LBS | TEMPERATURE: 98 F | DIASTOLIC BLOOD PRESSURE: 90 MMHG | OXYGEN SATURATION: 96 % | BODY MASS INDEX: 44.37 KG/M2 | HEART RATE: 86 BPM | HEIGHT: 60 IN | RESPIRATION RATE: 18 BRPM | SYSTOLIC BLOOD PRESSURE: 148 MMHG

## 2021-04-06 DIAGNOSIS — M25.512 BILATERAL SHOULDER PAIN, UNSPECIFIED CHRONICITY: Primary | ICD-10-CM

## 2021-04-06 DIAGNOSIS — M25.511 BILATERAL SHOULDER PAIN, UNSPECIFIED CHRONICITY: Primary | ICD-10-CM

## 2021-04-06 PROCEDURE — 20610 DRAIN/INJ JOINT/BURSA W/O US: CPT | Mod: PBBFAC,PN | Performed by: ORTHOPAEDIC SURGERY

## 2021-04-06 PROCEDURE — 99213 PR OFFICE/OUTPT VISIT, EST, LEVL III, 20-29 MIN: ICD-10-PCS | Mod: 25,S$PBB,, | Performed by: ORTHOPAEDIC SURGERY

## 2021-04-06 PROCEDURE — 99999 PR PBB SHADOW E&M-EST. PATIENT-LVL IV: ICD-10-PCS | Mod: PBBFAC,,, | Performed by: ORTHOPAEDIC SURGERY

## 2021-04-06 PROCEDURE — 99214 OFFICE O/P EST MOD 30 MIN: CPT | Mod: PBBFAC,PN,25 | Performed by: ORTHOPAEDIC SURGERY

## 2021-04-06 PROCEDURE — 99999 PR PBB SHADOW E&M-EST. PATIENT-LVL IV: CPT | Mod: PBBFAC,,, | Performed by: ORTHOPAEDIC SURGERY

## 2021-04-06 PROCEDURE — 20610 LARGE JOINT ASPIRATION/INJECTION: L SUBACROMIAL BURSA: ICD-10-PCS | Mod: S$PBB,LT,, | Performed by: ORTHOPAEDIC SURGERY

## 2021-04-06 PROCEDURE — 99213 OFFICE O/P EST LOW 20 MIN: CPT | Mod: 25,S$PBB,, | Performed by: ORTHOPAEDIC SURGERY

## 2021-04-06 RX ORDER — TRIAMCINOLONE ACETONIDE 40 MG/ML
40 INJECTION, SUSPENSION INTRA-ARTICULAR; INTRAMUSCULAR
Status: DISCONTINUED | OUTPATIENT
Start: 2021-04-06 | End: 2021-04-06 | Stop reason: HOSPADM

## 2021-04-06 RX ORDER — ETODOLAC 200 MG/1
200 CAPSULE ORAL EVERY 8 HOURS PRN
Qty: 30 CAPSULE | Refills: 0 | Status: SHIPPED | OUTPATIENT
Start: 2021-04-06 | End: 2022-04-18

## 2021-04-06 RX ADMIN — TRIAMCINOLONE ACETONIDE 40 MG: 40 INJECTION, SUSPENSION INTRA-ARTICULAR; INTRAMUSCULAR at 10:04

## 2021-04-07 ENCOUNTER — TELEPHONE (OUTPATIENT)
Dept: ORTHOPEDICS | Facility: CLINIC | Age: 46
End: 2021-04-07

## 2021-04-07 RX ORDER — DICLOFENAC SODIUM 50 MG/1
50 TABLET, DELAYED RELEASE ORAL 2 TIMES DAILY
Qty: 60 TABLET | Refills: 0 | OUTPATIENT
Start: 2021-04-07 | End: 2022-04-03

## 2021-05-03 ENCOUNTER — CLINICAL SUPPORT (OUTPATIENT)
Dept: BEHAVIORAL HEALTH | Facility: CLINIC | Age: 46
End: 2021-05-03
Payer: MEDICAID

## 2021-05-03 DIAGNOSIS — F41.9 ANXIETY DISORDER, UNSPECIFIED TYPE: Primary | ICD-10-CM

## 2021-05-03 DIAGNOSIS — F41.9 ANXIETY: ICD-10-CM

## 2021-05-03 PROCEDURE — 90791 PR PSYCHIATRIC DIAGNOSTIC EVALUATION: ICD-10-PCS | Mod: AJ,HB,, | Performed by: SOCIAL WORKER

## 2021-05-03 PROCEDURE — 99212 OFFICE O/P EST SF 10 MIN: CPT | Mod: PBBFAC,PO | Performed by: SOCIAL WORKER

## 2021-05-03 PROCEDURE — 99999 PR PBB SHADOW E&M-EST. PATIENT-LVL II: CPT | Mod: PBBFAC,,, | Performed by: SOCIAL WORKER

## 2021-05-03 PROCEDURE — 99999 PR PBB SHADOW E&M-EST. PATIENT-LVL II: ICD-10-PCS | Mod: PBBFAC,,, | Performed by: SOCIAL WORKER

## 2021-05-03 PROCEDURE — 90791 PSYCH DIAGNOSTIC EVALUATION: CPT | Mod: AJ,HB,, | Performed by: SOCIAL WORKER

## 2021-05-03 RX ORDER — ESCITALOPRAM OXALATE 10 MG/1
10 TABLET ORAL NIGHTLY
Qty: 90 TABLET | Refills: 0 | Status: SHIPPED | OUTPATIENT
Start: 2021-05-03 | End: 2021-08-27 | Stop reason: SDUPTHER

## 2021-05-05 ENCOUNTER — DOCUMENTATION ONLY (OUTPATIENT)
Dept: REHABILITATION | Facility: HOSPITAL | Age: 46
End: 2021-05-05

## 2021-05-18 ENCOUNTER — CLINICAL SUPPORT (OUTPATIENT)
Dept: BEHAVIORAL HEALTH | Facility: CLINIC | Age: 46
End: 2021-05-18
Payer: MEDICAID

## 2021-05-18 DIAGNOSIS — F41.9 ANXIETY DISORDER, UNSPECIFIED TYPE: Primary | ICD-10-CM

## 2021-05-18 PROCEDURE — 90832 PR PSYCHOTHERAPY W/PATIENT, 30 MIN: ICD-10-PCS | Mod: AJ,HB,, | Performed by: SOCIAL WORKER

## 2021-05-18 PROCEDURE — 90832 PSYTX W PT 30 MINUTES: CPT | Mod: AJ,HB,, | Performed by: SOCIAL WORKER

## 2021-05-20 ENCOUNTER — PATIENT OUTREACH (OUTPATIENT)
Dept: ADMINISTRATIVE | Facility: OTHER | Age: 46
End: 2021-05-20

## 2021-08-15 DIAGNOSIS — I10 ESSENTIAL HYPERTENSION: ICD-10-CM

## 2021-08-15 RX ORDER — METOPROLOL SUCCINATE 100 MG/1
TABLET, EXTENDED RELEASE ORAL
Qty: 90 TABLET | Refills: 1 | OUTPATIENT
Start: 2021-08-15

## 2021-08-18 ENCOUNTER — TELEPHONE (OUTPATIENT)
Dept: FAMILY MEDICINE | Facility: CLINIC | Age: 46
End: 2021-08-18

## 2021-08-27 ENCOUNTER — OFFICE VISIT (OUTPATIENT)
Dept: FAMILY MEDICINE | Facility: CLINIC | Age: 46
End: 2021-08-27
Payer: MEDICAID

## 2021-08-27 DIAGNOSIS — F41.9 ANXIETY: ICD-10-CM

## 2021-08-27 DIAGNOSIS — N63.20 BREAST MASS, LEFT: ICD-10-CM

## 2021-08-27 DIAGNOSIS — Z11.59 NEED FOR HEPATITIS C SCREENING TEST: Primary | ICD-10-CM

## 2021-08-27 DIAGNOSIS — I10 ESSENTIAL HYPERTENSION: ICD-10-CM

## 2021-08-27 PROCEDURE — 99214 PR OFFICE/OUTPT VISIT, EST, LEVL IV, 30-39 MIN: ICD-10-PCS | Mod: 95,,, | Performed by: INTERNAL MEDICINE

## 2021-08-27 PROCEDURE — 99214 OFFICE O/P EST MOD 30 MIN: CPT | Mod: 95,,, | Performed by: INTERNAL MEDICINE

## 2021-08-27 RX ORDER — METOPROLOL SUCCINATE 100 MG/1
100 TABLET, EXTENDED RELEASE ORAL DAILY
Qty: 90 TABLET | Refills: 1 | Status: SHIPPED | OUTPATIENT
Start: 2021-08-27 | End: 2022-03-23 | Stop reason: SDUPTHER

## 2021-08-27 RX ORDER — ESCITALOPRAM OXALATE 20 MG/1
20 TABLET ORAL NIGHTLY
Qty: 90 TABLET | Refills: 1 | Status: SHIPPED | OUTPATIENT
Start: 2021-08-27 | End: 2022-03-22

## 2021-10-28 ENCOUNTER — PATIENT MESSAGE (OUTPATIENT)
Dept: BEHAVIORAL HEALTH | Facility: CLINIC | Age: 46
End: 2021-10-28
Payer: MEDICAID

## 2021-12-08 ENCOUNTER — PATIENT MESSAGE (OUTPATIENT)
Dept: ADMINISTRATIVE | Facility: HOSPITAL | Age: 46
End: 2021-12-08
Payer: MEDICAID

## 2021-12-15 ENCOUNTER — PATIENT MESSAGE (OUTPATIENT)
Dept: ADMINISTRATIVE | Facility: HOSPITAL | Age: 46
End: 2021-12-15
Payer: MEDICAID

## 2021-12-18 ENCOUNTER — CLINICAL SUPPORT (OUTPATIENT)
Dept: URGENT CARE | Facility: CLINIC | Age: 46
End: 2021-12-18
Payer: MEDICAID

## 2021-12-18 ENCOUNTER — TELEPHONE (OUTPATIENT)
Dept: URGENT CARE | Facility: CLINIC | Age: 46
End: 2021-12-18

## 2021-12-18 DIAGNOSIS — U07.1 COVID-19 VIRUS DETECTED: ICD-10-CM

## 2021-12-18 DIAGNOSIS — Z20.822 ENCOUNTER FOR LABORATORY TESTING FOR COVID-19 VIRUS: Primary | ICD-10-CM

## 2021-12-18 DIAGNOSIS — U07.1 COVID-19: Primary | ICD-10-CM

## 2021-12-18 LAB
CTP QC/QA: YES
SARS-COV-2 RDRP RESP QL NAA+PROBE: POSITIVE

## 2021-12-18 PROCEDURE — U0002 COVID-19 LAB TEST NON-CDC: HCPCS | Mod: QW,S$GLB,, | Performed by: FAMILY MEDICINE

## 2021-12-18 PROCEDURE — 99211 PR OFFICE/OUTPT VISIT, EST, LEVL I: ICD-10-PCS | Mod: S$GLB,CS,,

## 2021-12-18 PROCEDURE — 99211 OFF/OP EST MAY X REQ PHY/QHP: CPT | Mod: S$GLB,CS,,

## 2021-12-18 PROCEDURE — U0002: ICD-10-PCS | Mod: QW,S$GLB,, | Performed by: FAMILY MEDICINE

## 2022-01-04 ENCOUNTER — LAB VISIT (OUTPATIENT)
Dept: PRIMARY CARE CLINIC | Facility: OTHER | Age: 47
End: 2022-01-04
Attending: INTERNAL MEDICINE
Payer: MEDICAID

## 2022-01-04 DIAGNOSIS — Z20.822 ENCOUNTER FOR LABORATORY TESTING FOR COVID-19 VIRUS: ICD-10-CM

## 2022-01-04 PROCEDURE — U0003 INFECTIOUS AGENT DETECTION BY NUCLEIC ACID (DNA OR RNA); SEVERE ACUTE RESPIRATORY SYNDROME CORONAVIRUS 2 (SARS-COV-2) (CORONAVIRUS DISEASE [COVID-19]), AMPLIFIED PROBE TECHNIQUE, MAKING USE OF HIGH THROUGHPUT TECHNOLOGIES AS DESCRIBED BY CMS-2020-01-R: HCPCS | Performed by: INTERNAL MEDICINE

## 2022-01-08 LAB
SARS-COV-2 RNA RESP QL NAA+PROBE: NOT DETECTED
SARS-COV-2- CYCLE NUMBER: NORMAL

## 2022-02-09 ENCOUNTER — TELEPHONE (OUTPATIENT)
Dept: FAMILY MEDICINE | Facility: CLINIC | Age: 47
End: 2022-02-09
Payer: MEDICAID

## 2022-03-09 ENCOUNTER — PATIENT MESSAGE (OUTPATIENT)
Dept: FAMILY MEDICINE | Facility: CLINIC | Age: 47
End: 2022-03-09
Payer: MEDICAID

## 2022-03-14 DIAGNOSIS — I10 ESSENTIAL HYPERTENSION: ICD-10-CM

## 2022-03-14 DIAGNOSIS — F41.9 ANXIETY: ICD-10-CM

## 2022-03-14 NOTE — TELEPHONE ENCOUNTER
No new care gaps identified.  Powered by [employer]. Reference number: [id number].   3/14/2022 1:00:17 PM CDT

## 2022-03-22 RX ORDER — ESCITALOPRAM OXALATE 20 MG/1
20 TABLET ORAL NIGHTLY
Qty: 90 TABLET | Refills: 1 | Status: SHIPPED | OUTPATIENT
Start: 2022-03-22 | End: 2022-04-18

## 2022-03-22 NOTE — TELEPHONE ENCOUNTER
Refill Routing Note   Medication(s) are not appropriate for processing by [facility] for the following reason(s):      - Required vitals are abnormal    [facility] action(s):  Defer  Approve       Medication Therapy Plan: DEFER metoprolol (bp outside normal limits) APPROVE escitalopram  --->Care Gap information included in message below if applicable.   Medication reconciliation completed: No   Automatic Epic Generated Protocol Data:    Orders Placed This Encounter    EScitalopram oxalate (LEXAPRO) 20 MG tablet      Requested Prescriptions   Pending Prescriptions Disp Refills    metoprolol succinate (TOPROL-XL) 100 MG 24 hr tablet [Pharmacy Med Name: METOPROLOL ER SUCCINATE 100MG TABS] 90 tablet 1     Sig: Take 1 tablet (100 mg total) by mouth once daily.       Cardiovascular:  Beta Blockers Failed - 3/22/2022  7:56 AM        Failed - Last BP in normal range within 360 days     BP Readings from Last 3 Encounters:   04/06/21 (!) 148/90   11/12/20 (!) 151/97   09/11/20 138/86               Passed - Patient is at least 18 years old        Passed - Negative Pregnancy Status Check        Passed - Last Heart Rate in normal range within 360 days     Pulse Readings from Last 1 Encounters:   04/06/21 86              Passed - Valid encounter within last 15 months     Recent Visits  Date Type Provider Dept   08/27/21 Office Visit Geovanni Britton MD [facility] Family Med/ Internal Med/ Peds   09/11/20 Office Visit Geovanni Britton MD [facility] Family Med/ Internal Med/ Peds   07/24/20 Office Visit Geovanni Britton MD [facility] Family Med/ Internal Med/ Peds   04/03/20 Office Visit Geovanni Britton MD [facility] Family Med/ Internal Med/ Peds   Showing recent visits within past 720 days and meeting all other requirements  Future Appointments  No visits were found meeting these conditions.  Showing future appointments within next 150 days and meeting all other requirements                 Signed Prescriptions Disp Refills    EScitalopram  oxalate (LEXAPRO) 20 MG tablet 90 tablet 1     Sig: Take 1 tablet (20 mg total) by mouth every evening.       Psychiatry:  Antidepressants - SSRI Passed - 3/22/2022  7:56 AM        Passed - Patient is at least 18 years old        Passed - Negative Pregnancy Status Check        Passed - Valid encounter within last 15 months     Recent Visits  Date Type Provider Dept   08/27/21 Office Visit Geovanni Britton MD [facility] Family Med/ Internal Med/ Peds   09/11/20 Office Visit Geovanni Britton MD [facility] Family Med/ Internal Med/ Peds   07/24/20 Office Visit Geovanni Britton MD [facility] Family Med/ Internal Med/ Peds   04/03/20 Office Visit Geovanni Britton MD [facility] Family Med/ Internal Med/ Peds   Showing recent visits within past 720 days and meeting all other requirements  Future Appointments  No visits were found meeting these conditions.  Showing future appointments within next 150 days and meeting all other requirements                      Appointments  past 12m or future 3m with PCP    Date Provider   Last Visit   8/27/2021 Geovanni Britton MD   Next Visit   Visit date not found Geovanni Britton MD   ED visits in past 90 days: 0        Note composed:10:30 AM 03/22/2022

## 2022-03-22 NOTE — TELEPHONE ENCOUNTER
----- Message from Nicole Paige sent at 3/22/2022  7:39 AM CDT -----  Type:  Sooner Appointment Request    Patient is requesting a sooner appointment.  Patient declined first available appointment listed as well as another facility and provider .  Patient will not accept being placed on the waitlist and is requesting a message be sent to doctor.    Name of Caller:  Self     When is the first available appointment?  Epic is not offering any appt's with Dr. Britton and the next available with anyone in the office is April 12.     Symptoms:  Need Anxiety meds refilled     Would the patient rather a call back or a response via [facility]? Call     Best Call Back Number: .[phone] (home)

## 2022-03-23 DIAGNOSIS — I10 ESSENTIAL HYPERTENSION: ICD-10-CM

## 2022-03-23 RX ORDER — METOPROLOL SUCCINATE 100 MG/1
100 TABLET, EXTENDED RELEASE ORAL DAILY
Qty: 90 TABLET | Refills: 1 | OUTPATIENT
Start: 2022-03-23

## 2022-03-23 RX ORDER — METOPROLOL SUCCINATE 100 MG/1
100 TABLET, EXTENDED RELEASE ORAL DAILY
Qty: 30 TABLET | Refills: 0 | Status: SHIPPED | OUTPATIENT
Start: 2022-03-23 | End: 2022-03-24 | Stop reason: SDUPTHER

## 2022-03-23 NOTE — TELEPHONE ENCOUNTER
Please schedule patient for BP/HTN visit on April 18th at 8A (5 DAY CHANGE - ok to override). 30 day refill of BP medication sent

## 2022-03-23 NOTE — TELEPHONE ENCOUNTER
Spoke with patient informed that a 30 days supply of blood pressure medication was sent to pharmacy per Dr. Britton. Patient also informed that an office visit was needed for blood pressure follow-up. Patient scheduled as this time.

## 2022-03-23 NOTE — TELEPHONE ENCOUNTER
No new care gaps identified.  Powered by [employer]. Reference number: [id number].   3/23/2022 9:24:49 AM CDT

## 2022-03-23 NOTE — TELEPHONE ENCOUNTER
----- Message from Anh Colindres sent at 3/23/2022  9:09 AM CDT -----  Type: Patient Call Back    Who called: self     What is the request in detail: Patient would like to speak with a nurse regarding her refill for her metoprolol succinate (TOPROL-XL) 100 MG.     Can the clinic reply by [facility]? No     Would the patient rather a call back or a response via [facility]? Call back     Best call back number: [phone]

## 2022-03-24 DIAGNOSIS — I10 ESSENTIAL HYPERTENSION: ICD-10-CM

## 2022-03-24 RX ORDER — METOPROLOL SUCCINATE 100 MG/1
100 TABLET, EXTENDED RELEASE ORAL DAILY
Qty: 30 TABLET | Refills: 0 | Status: SHIPPED | OUTPATIENT
Start: 2022-03-24 | End: 2022-04-20

## 2022-03-24 NOTE — TELEPHONE ENCOUNTER
No new care gaps identified.  Powered by [employer]. Reference number: [id number].   3/24/2022 3:27:59 PM CDT

## 2022-03-25 NOTE — TELEPHONE ENCOUNTER
Could not leave a message for the Patient, voicemail was not setup.  Sent a message thru [employer] to inform  The Patient RX refill was approved and sent to the preferred pharmacy.

## 2022-04-03 ENCOUNTER — HOSPITAL ENCOUNTER (EMERGENCY)
Facility: HOSPITAL | Age: 47
Discharge: HOME | End: 2022-04-03
Attending: EMERGENCY MEDICINE
Payer: MEDICAID

## 2022-04-03 VITALS
TEMPERATURE: 98 F | DIASTOLIC BLOOD PRESSURE: 91 MMHG | BODY MASS INDEX: 46.13 KG/M2 | SYSTOLIC BLOOD PRESSURE: 201 MMHG | OXYGEN SATURATION: 98 % | HEART RATE: 85 BPM | HEIGHT: 60 IN | WEIGHT: 235 LBS | RESPIRATION RATE: 18 BRPM

## 2022-04-03 DIAGNOSIS — M25.561 KNEE PAIN, BILATERAL: Primary | ICD-10-CM

## 2022-04-03 DIAGNOSIS — M25.50 ARTHRALGIA OF MULTIPLE JOINTS: ICD-10-CM

## 2022-04-03 DIAGNOSIS — M25.511 CHRONIC PAIN OF BOTH SHOULDERS: ICD-10-CM

## 2022-04-03 DIAGNOSIS — M25.562 KNEE PAIN, BILATERAL: Primary | ICD-10-CM

## 2022-04-03 DIAGNOSIS — M25.512 CHRONIC PAIN OF BOTH SHOULDERS: ICD-10-CM

## 2022-04-03 DIAGNOSIS — R03.0 ELEVATED BLOOD PRESSURE READING: ICD-10-CM

## 2022-04-03 DIAGNOSIS — G89.29 CHRONIC PAIN OF BOTH SHOULDERS: ICD-10-CM

## 2022-04-03 LAB
B-HCG UR QL: NEGATIVE
CTP QC/QA: YES

## 2022-04-03 PROCEDURE — 63600175 PHARM REV CODE 636 W HCPCS: Performed by: PHYSICIAN ASSISTANT

## 2022-04-03 PROCEDURE — 81025 URINE PREGNANCY TEST: CPT | Performed by: EMERGENCY MEDICINE

## 2022-04-03 PROCEDURE — 96372 THER/PROPH/DIAG INJ SC/IM: CPT | Performed by: PHYSICIAN ASSISTANT

## 2022-04-03 PROCEDURE — 25000003 PHARM REV CODE 250: Performed by: PHYSICIAN ASSISTANT

## 2022-04-03 PROCEDURE — 99284 EMERGENCY DEPT VISIT MOD MDM: CPT

## 2022-04-03 RX ORDER — MELOXICAM 7.5 MG/1
15 TABLET ORAL DAILY
Qty: 7 TABLET | Refills: 0 | Status: SHIPPED | OUTPATIENT
Start: 2022-04-03 | End: 2022-04-18 | Stop reason: SDUPTHER

## 2022-04-03 RX ORDER — TRAMADOL HYDROCHLORIDE 50 MG/1
50 TABLET ORAL EVERY 6 HOURS PRN
Qty: 9 TABLET | Refills: 0 | Status: SHIPPED | OUTPATIENT
Start: 2022-04-03 | End: 2022-04-05

## 2022-04-03 RX ORDER — KETOROLAC TROMETHAMINE 30 MG/ML
15 INJECTION, SOLUTION INTRAMUSCULAR; INTRAVENOUS
Status: COMPLETED | OUTPATIENT
Start: 2022-04-03 | End: 2022-04-03

## 2022-04-03 RX ORDER — HYDROCODONE BITARTRATE AND ACETAMINOPHEN 5; 325 MG/1; MG/1
1 TABLET ORAL
Status: COMPLETED | OUTPATIENT
Start: 2022-04-03 | End: 2022-04-03

## 2022-04-03 RX ORDER — DEXAMETHASONE SODIUM PHOSPHATE 4 MG/ML
8 INJECTION, SOLUTION INTRA-ARTICULAR; INTRALESIONAL; INTRAMUSCULAR; INTRAVENOUS; SOFT TISSUE
Status: COMPLETED | OUTPATIENT
Start: 2022-04-03 | End: 2022-04-03

## 2022-04-03 RX ORDER — ACETAMINOPHEN 500 MG
1000 TABLET ORAL EVERY 8 HOURS
Refills: 0 | COMMUNITY
Start: 2022-04-03 | End: 2022-04-10

## 2022-04-03 RX ADMIN — DEXAMETHASONE SODIUM PHOSPHATE 8 MG: 4 INJECTION INTRA-ARTICULAR; INTRALESIONAL; INTRAMUSCULAR; INTRAVENOUS; SOFT TISSUE at 04:04

## 2022-04-03 RX ADMIN — HYDROCODONE BITARTRATE AND ACETAMINOPHEN 1 TABLET: 5; 325 TABLET ORAL at 04:04

## 2022-04-03 RX ADMIN — KETOROLAC TROMETHAMINE 15 MG: 30 INJECTION, SOLUTION INTRAMUSCULAR at 04:04

## 2022-04-03 NOTE — ED TRIAGE NOTES
Pt reports bilateral knee pain & bilateral shoulder pain x2 days. Denies injury. Pt took tylenol extra strength this morning at 9AM.

## 2022-04-03 NOTE — ED PROVIDER NOTES
Encounter Date: 4/3/2022    SCRIBE #1 NOTE: I, Luis Carlos Jiménez, am scribing for, and in the presence of,  NAN Green. I have scribed the following portions of the note - Other sections scribed: HPI, ROS.       History     Chief Complaint   Patient presents with    Generalized Body Aches     Patient reports pain to bilateral shoulders and knees that is  an ongoing problem, states pain increased today.      46 y.o. female, with a pertinent past medical history of pulmonary embolism and hypertension, presents to the ED with intermittent bilateral knee pain and bilateral shoulder pain that began 2 weeks ago. Pt states that the pain has become constant for both symptoms starting yesterday. Pt describes the pain as an aching sensation and rates the pain as a 10/10. Pt denies having any recent falls or trauma. Pt states that she has received shoulder X-rays previously and has received shoulder joint injections with relief in the past.    Today, pt states that she has taken a Tylenol prior to arrival with mild relief. No other exacerbating or alleviating factors. Patient denies other associated symptoms.       The history is provided by the patient. No  was used.     Review of patient's allergies indicates:   Allergen Reactions    Motrin ib [ibuprofen] Swelling     Swollen lips and knot on her head     Lisinopril Other (See Comments)     Angioedema     Past Medical History:   Diagnosis Date    Allergy     Angio-edema     Eczema     History of pulmonary embolism 2005    Hypertension     Urticaria      Past Surgical History:   Procedure Laterality Date    FRACTURE SURGERY      Plate in right arm       VAGINAL DELIVERY       Family History   Problem Relation Age of Onset    Cancer Maternal Grandmother     Allergic rhinitis Mother     Stroke Father     Breast cancer Neg Hx     Ovarian cancer Neg Hx      Social History     Tobacco Use    Smoking status: Never Smoker    Smokeless  tobacco: Never Used   Substance Use Topics    Alcohol use: Yes     Comment: Social     Drug use: No     Review of Systems   Constitutional: Negative for chills and fever.   HENT: Negative for congestion, rhinorrhea and sore throat.    Eyes: Negative for visual disturbance.   Respiratory: Negative for cough and shortness of breath.    Cardiovascular: Negative for chest pain.   Gastrointestinal: Negative for abdominal pain, diarrhea, nausea and vomiting.   Genitourinary: Negative for dysuria, frequency and hematuria.   Musculoskeletal: Negative for back pain.        (+) bilateral shoulder pain  (+) bilateral knee pain   Skin: Negative for rash.   Neurological: Negative for dizziness, weakness and headaches.       Physical Exam     Initial Vitals [04/03/22 1531]   BP Pulse Resp Temp SpO2   (!) 186/86 94 20 99 °F (37.2 °C) 99 %      MAP       --         Physical Exam    Nursing note and vitals reviewed.  Constitutional: She appears well-developed and well-nourished.   HENT:   Head: Atraumatic.   Eyes: Conjunctivae and EOM are normal. Pupils are equal, round, and reactive to light.   Neck: Neck supple.   Normal range of motion.  Cardiovascular: Normal rate, regular rhythm and intact distal pulses.   Pulmonary/Chest: Breath sounds normal. No respiratory distress. She has no wheezes. She has no rales.   Abdominal: Abdomen is soft. Bowel sounds are normal. There is no abdominal tenderness.   Musculoskeletal:      Right shoulder: No swelling or bony tenderness. Decreased range of motion ( secondary to pain). Normal strength.      Left shoulder: No swelling or bony tenderness. Decreased range of motion (secondary to pain). Normal strength.      Cervical back: Normal range of motion and neck supple.      Right knee: Effusion (mild suprapatellar swelling, no overlying erythema) present. Normal range of motion.      Left knee: Effusion (mild suprapatellar swelling . no overlying erythema) present. Normal range of motion.  "    Neurological: She is alert and oriented to person, place, and time. She has normal strength. GCS score is 15. GCS eye subscore is 4. GCS verbal subscore is 5. GCS motor subscore is 6.   Skin: No rash noted.   Psychiatric: She has a normal mood and affect.         ED Course   Procedures  Labs Reviewed   POCT URINE PREGNANCY          Imaging Results          X-Ray Knee 3 View Bilateral (Final result)  Result time 04/03/22 18:03:08    Final result by Steven Mckeon MD (04/03/22 18:03:08)                 Impression:      1. Small bilateral suprapatellar effusions, no acute displaced fracture or dislocation of either knee.      Electronically signed by: Steven Mckeon MD  Date:    04/03/2022  Time:    18:03             Narrative:    EXAMINATION:  XR KNEE 3 VIEW BILATERAL    CLINICAL HISTORY:  Pain in right knee    TECHNIQUE:  AP, lateral, and Merchant views of both knees were performed.    COMPARISON:  None    FINDINGS:  Five views bilateral knees.    No acute displaced fracture or dislocation of the left or right knee.  There are small bilateral suprapatellar effusions.  No radiopaque foreign body.                                 Medications   ketorolac injection 15 mg (15 mg Intramuscular Given 4/3/22 1656)   dexamethasone injection 8 mg (8 mg Intramuscular Given 4/3/22 1656)   HYDROcodone-acetaminophen 5-325 mg per tablet 1 tablet (1 tablet Oral Given 4/3/22 1656)     Medical Decision Making:   History:   Old Medical Records: I decided to obtain old medical records.       APC / Resident Notes:   Patient presents to the ER for evaluation due to bilateral shoulder and bilateral knee pain x 2 weeks.  No recent fall or trauma.  I reviewed the patients chart.      Patient seen in Orthopedic clinic most recently for bilateral shoulder pain 4/6/2021.  She had left shoulder xray on the same day, which showed " Chondrocalcinosis, calcific bursitis change superolateral glenohumeral joint with early DJD changes AC joint " "and glenohumeral joint."  Patient also had xray of right shoulder ordered by orthopedics 7/20/2020 which showed " No acute fractures, Question calcific tendonitis/bursitis as above, DJD of the acromioclavicular joint.     She reports shoulder pain for 2 weeks, no recent trauma. No overlying swelling..   I do not see an indication for repeat emergent shoulder xrays today, but will advise close f/u with Orthopedics in clinic due to relief with joint injections in the past.      Xrays of bilateral knees are obtained today and show Small bilateral suprapatellar effusions, no acute displaced fracture or dislocation of either knee.   Patient is given toradol, decadron and norco with improvement of her pain.  She has normal knee ROM, no significant pain with passive ROM of knee.  No overlying skin erythema or recent fever and I do not suspect infectious process or septic joint at this time.      Will send home with prescription for NSAID and tramadol and advise on close f/u with PCP and Orthopedics.  Patient is comfortable with this plan.  She is given ER return precautions.         Scribe Attestation:   Scribe #1: I performed the above scribed service and the documentation accurately describes the services I performed. I attest to the accuracy of the note.        ED Course as of 04/05/22 0101   Sun Apr 03, 2022   1904 BP elevated, no chest pain or SOB - due for BP medications this evening - will take when she returns home [LH]      ED Course User Index  [LH] NAN Green             Clinical Impression:   Final diagnoses:  [M25.561, M25.562] Knee pain, bilateral (Primary)  [R03.0] Elevated blood pressure reading  [M25.50] Arthralgia of multiple joints  [M25.511, G89.29, M25.512] Chronic pain of both shoulders          ED Disposition Condition    Discharge Stable        ED Prescriptions     Medication Sig Dispense Start Date End Date Auth. Provider    meloxicam (MOBIC) 7.5 MG tablet Take 2 tablets (15 mg total) by " mouth once daily. 7 tablet 4/3/2022  NAN Green    acetaminophen (TYLENOL) 500 MG tablet Take 2 tablets (1,000 mg total) by mouth every 8 (eight) hours. for 7 days  4/3/2022 4/10/2022 NAN Green    traMADoL (ULTRAM) 50 mg tablet (Expires today) Take 1 tablet (50 mg total) by mouth every 6 (six) hours as needed for Pain. 9 tablet 4/3/2022 4/5/2022 NAN Green        Follow-up Information     Follow up With Specialties Details Why Contact Info    Geovanni Britton MD Internal Medicine, Pediatrics Call in 1 day To discuss ER visit and schedule follow up appointment within 1 week [address] [facility]  [place] [address] [phone]      Caridad Agudelo MD Orthopedic Surgery Schedule an appointment as soon as possible for a visit in 1 week  [address] [facility] [facility]  [place] [id number] [phone]         ISophia PA-C, personally performed the services described in this documentation. All medical record entries made by the scribe were at my direction and in my presence. I have reviewed the chart and agree that the record reflects my personal performance and is accurate and complete.       NAN Green  04/05/22 0101

## 2022-04-04 NOTE — ED NOTES
NAN Cortes notified of pt's /91, pt asymptomatic and takes BP meds at night.   Offered to give pt a dose of BP medication, pt declined and stated she will take her medications when she gets home.

## 2022-04-18 ENCOUNTER — OFFICE VISIT (OUTPATIENT)
Dept: FAMILY MEDICINE | Facility: CLINIC | Age: 47
End: 2022-04-18
Payer: MEDICAID

## 2022-04-18 VITALS
TEMPERATURE: 99 F | HEART RATE: 75 BPM | SYSTOLIC BLOOD PRESSURE: 146 MMHG | BODY MASS INDEX: 45.25 KG/M2 | WEIGHT: 230.5 LBS | OXYGEN SATURATION: 96 % | DIASTOLIC BLOOD PRESSURE: 92 MMHG | HEIGHT: 60 IN

## 2022-04-18 DIAGNOSIS — J30.89 ALLERGIC RHINITIS DUE TO OTHER ALLERGIC TRIGGER, UNSPECIFIED SEASONALITY: ICD-10-CM

## 2022-04-18 DIAGNOSIS — M79.643 CHRONIC HAND PAIN, UNSPECIFIED LATERALITY: ICD-10-CM

## 2022-04-18 DIAGNOSIS — Z12.31 BREAST CANCER SCREENING BY MAMMOGRAM: ICD-10-CM

## 2022-04-18 DIAGNOSIS — I10 ESSENTIAL HYPERTENSION: ICD-10-CM

## 2022-04-18 DIAGNOSIS — Z23 NEED FOR TDAP VACCINATION: ICD-10-CM

## 2022-04-18 DIAGNOSIS — Z00.00 ROUTINE ADULT HEALTH MAINTENANCE: Primary | ICD-10-CM

## 2022-04-18 DIAGNOSIS — G89.29 CHRONIC HAND PAIN, UNSPECIFIED LATERALITY: ICD-10-CM

## 2022-04-18 DIAGNOSIS — M13.0 POLYARTHRITIS: ICD-10-CM

## 2022-04-18 DIAGNOSIS — Z11.59 NEED FOR HEPATITIS C SCREENING TEST: ICD-10-CM

## 2022-04-18 DIAGNOSIS — E66.01 CLASS 3 SEVERE OBESITY DUE TO EXCESS CALORIES WITH SERIOUS COMORBIDITY AND BODY MASS INDEX (BMI) OF 45.0 TO 49.9 IN ADULT: ICD-10-CM

## 2022-04-18 DIAGNOSIS — I10 PRIMARY HYPERTENSION: ICD-10-CM

## 2022-04-18 DIAGNOSIS — D50.9 MICROCYTIC ANEMIA: ICD-10-CM

## 2022-04-18 PROCEDURE — 3080F PR MOST RECENT DIASTOLIC BLOOD PRESSURE >= 90 MM HG: ICD-10-PCS | Mod: CPTII,,, | Performed by: INTERNAL MEDICINE

## 2022-04-18 PROCEDURE — 99999 PR PBB SHADOW E&M-EST. PATIENT-LVL III: CPT | Mod: PBBFAC,,, | Performed by: INTERNAL MEDICINE

## 2022-04-18 PROCEDURE — 3080F DIAST BP >= 90 MM HG: CPT | Mod: CPTII,,, | Performed by: INTERNAL MEDICINE

## 2022-04-18 PROCEDURE — 1159F MED LIST DOCD IN RCRD: CPT | Mod: CPTII,,, | Performed by: INTERNAL MEDICINE

## 2022-04-18 PROCEDURE — 3077F SYST BP >= 140 MM HG: CPT | Mod: CPTII,,, | Performed by: INTERNAL MEDICINE

## 2022-04-18 PROCEDURE — 99999 PR PBB SHADOW E&M-EST. PATIENT-LVL III: ICD-10-PCS | Mod: PBBFAC,,, | Performed by: INTERNAL MEDICINE

## 2022-04-18 PROCEDURE — 99214 OFFICE O/P EST MOD 30 MIN: CPT | Mod: 25,S$PBB,, | Performed by: INTERNAL MEDICINE

## 2022-04-18 PROCEDURE — 1159F PR MEDICATION LIST DOCUMENTED IN MEDICAL RECORD: ICD-10-PCS | Mod: CPTII,,, | Performed by: INTERNAL MEDICINE

## 2022-04-18 PROCEDURE — 3008F PR BODY MASS INDEX (BMI) DOCUMENTED: ICD-10-PCS | Mod: CPTII,,, | Performed by: INTERNAL MEDICINE

## 2022-04-18 PROCEDURE — 4010F ACE/ARB THERAPY RXD/TAKEN: CPT | Mod: CPTII,,, | Performed by: INTERNAL MEDICINE

## 2022-04-18 PROCEDURE — 3077F PR MOST RECENT SYSTOLIC BLOOD PRESSURE >= 140 MM HG: ICD-10-PCS | Mod: CPTII,,, | Performed by: INTERNAL MEDICINE

## 2022-04-18 PROCEDURE — 99214 PR OFFICE/OUTPT VISIT, EST, LEVL IV, 30-39 MIN: ICD-10-PCS | Mod: 25,S$PBB,, | Performed by: INTERNAL MEDICINE

## 2022-04-18 PROCEDURE — 3008F BODY MASS INDEX DOCD: CPT | Mod: CPTII,,, | Performed by: INTERNAL MEDICINE

## 2022-04-18 PROCEDURE — 90715 TDAP VACCINE 7 YRS/> IM: CPT | Mod: PBBFAC,PO

## 2022-04-18 PROCEDURE — 4010F PR ACE/ARB THEARPY RXD/TAKEN: ICD-10-PCS | Mod: CPTII,,, | Performed by: INTERNAL MEDICINE

## 2022-04-18 PROCEDURE — 99213 OFFICE O/P EST LOW 20 MIN: CPT | Mod: PBBFAC,PO | Performed by: INTERNAL MEDICINE

## 2022-04-18 RX ORDER — DULOXETIN HYDROCHLORIDE 30 MG/1
30 CAPSULE, DELAYED RELEASE ORAL DAILY
Qty: 30 CAPSULE | Refills: 2 | Status: SHIPPED | OUTPATIENT
Start: 2022-04-18 | End: 2022-08-26

## 2022-04-18 RX ORDER — LOSARTAN POTASSIUM 25 MG/1
25 TABLET ORAL DAILY
Qty: 90 TABLET | Refills: 0 | Status: SHIPPED | OUTPATIENT
Start: 2022-04-18 | End: 2023-01-24

## 2022-04-18 RX ORDER — FLUTICASONE PROPIONATE 50 MCG
1 SPRAY, SUSPENSION (ML) NASAL DAILY
Qty: 16 G | Refills: 2 | Status: SHIPPED | OUTPATIENT
Start: 2022-04-18 | End: 2023-01-24

## 2022-04-18 RX ORDER — MELOXICAM 15 MG/1
15 TABLET ORAL DAILY
Qty: 30 TABLET | Refills: 2 | Status: SHIPPED | OUTPATIENT
Start: 2022-04-18 | End: 2022-09-14 | Stop reason: SDUPTHER

## 2022-04-18 NOTE — Clinical Note
Please schedule patient for a nurse blood pressure visit in 1 week approximately to evaluate the effect of her new medication.

## 2022-04-18 NOTE — PROGRESS NOTES
Subjective:     Chief Complaint  Chief Complaint   Patient presents with    Hypertension       HPI  Asha Paige is a 46 y.o. female with medical diagnoses as listed in the medical history and problem list that presents for above complaint(s).    Arthritis of knees  Seen by Ortho for injections of bilateral shoulders in the past     Knowing that she needs a refill of her blood pressure medication with elevated blood pressure has been noted recently    Patient Care Team:  Geovanni Britton MD as PCP - General (Internal Medicine)  Valeria Noe LPN as Licensed Practical Nurse  Valeria Noe LPN as Care Coordinator      PAST MEDICAL HISTORY:  Past Medical History:   Diagnosis Date    Allergy     Angio-edema     Eczema     History of pulmonary embolism 2005    Hypertension     Urticaria        PAST SURGICAL HISTORY:  Past Surgical History:   Procedure Laterality Date    FRACTURE SURGERY      Plate in right arm       VAGINAL DELIVERY         SOCIAL HISTORY:  Social History     Socioeconomic History    Marital status: Single   Tobacco Use    Smoking status: Never Smoker    Smokeless tobacco: Never Used   Substance and Sexual Activity    Alcohol use: Yes     Comment: Social     Drug use: No    Sexual activity: Yes     Partners: Male       FAMILY HISTORY:  Family History   Problem Relation Age of Onset    Cancer Maternal Grandmother     Allergic rhinitis Mother     Stroke Father     Breast cancer Neg Hx     Ovarian cancer Neg Hx        ALLERGIES AND MEDICATIONS: updated and reviewed.  Review of patient's allergies indicates:   Allergen Reactions    Motrin ib [ibuprofen] Swelling     Swollen lips and knot on her head     Lisinopril Other (See Comments)     Angioedema     Current Outpatient Medications   Medication Sig Dispense Refill    cetirizine (ZYRTEC) 10 MG tablet Take 1 tablet (10 mg total) by mouth once daily. 30 tablet 2    doxycycline (VIBRAMYCIN) 100 MG Cap TK 1 C PO QD FOR  ACNE      DULoxetine (CYMBALTA) 30 MG capsule Take 1 capsule (30 mg total) by mouth once daily. 30 capsule 2    ergocalciferol (ERGOCALCIFEROL) 50,000 unit Cap Take 50,000 Units by mouth every 7 days.      fluticasone propionate (FLONASE) 50 mcg/actuation nasal spray 1 spray (50 mcg total) by Each Nostril route once daily. 16 g 2    hydrocortisone 2.5 % ointment APPLY TOPICALLY TO FACE BID PRN UTD      losartan (COZAAR) 25 MG tablet Take 1 tablet (25 mg total) by mouth once daily. 90 tablet 0    meloxicam (MOBIC) 15 MG tablet Take 1 tablet (15 mg total) by mouth once daily. 30 tablet 2    metoprolol succinate (TOPROL-XL) 100 MG 24 hr tablet TAKE 1 TABLET(100 MG) BY MOUTH EVERY DAY 30 tablet 0    triamcinolone acetonide 0.1% (KENALOG) 0.1 % cream Apply topically 2 (two) times daily. Apply to affected areas of skin for 14 days 80 g 0     No current facility-administered medications for this visit.       Review of Systems   Constitutional: Negative for appetite change, chills, fever and unexpected weight change.   Respiratory: Negative for cough and shortness of breath.    Cardiovascular: Negative for chest pain, palpitations and leg swelling.   Gastrointestinal: Negative for abdominal pain, constipation, diarrhea, nausea and vomiting.   Musculoskeletal: Negative.    Skin: Negative.    Neurological: Negative for dizziness, light-headedness and headaches.   Psychiatric/Behavioral: Negative for dysphoric mood. The patient is not nervous/anxious.        Objective:       Physical Exam  Vitals:    04/18/22 0756   BP: (!) 146/92   BP Location: Left arm   Patient Position: Sitting   BP Method: X-Large (Manual)   Pulse: 75   Temp: 99 °F (37.2 °C)   TempSrc: Oral   SpO2: 96%   Weight: 104.5 kg (230 lb 7.9 oz)   Height: 5' (1.524 m)    Body mass index is 45.01 kg/m².  Weight: 104.5 kg (230 lb 7.9 oz)   Height: 5' (152.4 cm)   Physical Exam  Vitals reviewed.   Constitutional:       General: She is not in acute distress.      Appearance: She is well-developed.   HENT:      Head: Normocephalic and atraumatic.   Eyes:      Conjunctiva/sclera: Conjunctivae normal.      Pupils: Pupils are equal, round, and reactive to light.   Neck:      Thyroid: No thyromegaly.   Cardiovascular:      Rate and Rhythm: Normal rate.      Heart sounds: Normal heart sounds. No murmur heard.  Pulmonary:      Effort: Pulmonary effort is normal.      Breath sounds: Normal breath sounds.   Musculoskeletal:         General: Tenderness and deformity present.      Cervical back: Normal range of motion and neck supple.      Comments: Multiple PIP and D IP joints of bilateral hands   Lymphadenopathy:      Cervical: No cervical adenopathy.   Skin:     General: Skin is warm and dry.   Neurological:      Mental Status: She is alert.      Cranial Nerves: No cranial nerve deficit.   Psychiatric:         Behavior: Behavior normal.             Assessment:     1. Routine adult health maintenance    2. Primary hypertension    3. Need for Tdap vaccination    4. Breast cancer screening by mammogram    5. Chronic hand pain, unspecified laterality    6. Microcytic anemia    7. Polyarthritis    8. Need for hepatitis C screening test    9. Essential hypertension    10. Class 3 severe obesity due to excess calories with serious comorbidity and body mass index (BMI) of 45.0 to 49.9 in adult    11. Allergic rhinitis due to other allergic trigger, unspecified seasonality      Plan:     Asha was seen today for hypertension.    Diagnoses and all orders for this visit:    CPE  Discussed healthy diet, regular exercise, necessary labs, age appropriate cancer screening, and routine vaccinations.      Primary hypertension  Class 3 severe obesity due to excess calories with serious comorbidity  Blood pressure is elevated and she is only on beta-blocker therapy which is likely insufficient  Discussed starting losartan/ARB at this time  We discussed weight loss and dietary interventions as  well  -     losartan (COZAAR) 25 MG tablet; Take 1 tablet (25 mg total) by mouth once daily.      Need for Tdap vaccination  Counseled regarding Tdap vaccination - administered  -     (In Office Administered) Tdap Vaccine    Breast cancer screening by mammogram  Counseled regarding risks and benefits of routine breast cancer screening. Referral for mammo placed.  -     Mammo Digital Screening Bilat w/ Corky; Future    Chronic hand pain, unspecified laterality  Polyarthritis  Discussed evaluation for osteoarthritis versus inflammatory arthritis  -     X-Ray Hand 3 View Bilateral; Future  -     DULoxetine (CYMBALTA) 30 MG capsule; Take 1 capsule (30 mg total) by mouth once daily.  -     Rheumatoid Factor; Future  -     CYCLIC CITRUL PEPTIDE ANTIBODY, IGG; Future  -     meloxicam (MOBIC) 15 MG tablet; Take 1 tablet (15 mg total) by mouth once daily.    Microcytic anemia  -     C-reactive protein; Future  -     Sedimentation rate; Future  -     CBC Auto Differential; Future  -     Ferritin; Future    Need for hepatitis C screening test  Ordered per USPSTF guidelines  -     Hepatitis C Antibody; Futur      Allergic rhinitis due to other allergic trigger, unspecified seasonality  Discussed symptoms of allergic rhinitis and reviewed treatment modalities, including antihistamines, nasal steroids and advantages/limitations of OTC products (i.e., OTC decongestants, sinus rinse kits). Therapy as noted/per orders.  -     fluticasone propionate (FLONASE) 50 mcg/actuation nasal spray; 1 spray (50 mcg total) by Each Nostril route once daily.        Health Maintenance reviewed, addressed as per orders    F/u in 1 week (nurse BP visit)    1. The patient indicates understanding of these issues and agrees with the plan. Brief care plan is updated and reviewed with the patient as applicable.   2. The patient is given an After Visit Summary that lists all medications with directions, allergies, orders placed during this encounter and  follow-up instructions.   3. I have reviewed the patient's medical information including past medical, family, and social history sections including the medications and allergies.   4. We discussed the patient's current medications. I reconciled the patient's medication list and prepared and supplied needed refills.       Geovanni Britton MD  Internal Medicine-Pediatrics

## 2022-04-19 DIAGNOSIS — I10 ESSENTIAL HYPERTENSION: ICD-10-CM

## 2022-04-20 RX ORDER — METOPROLOL SUCCINATE 100 MG/1
TABLET, EXTENDED RELEASE ORAL
Qty: 30 TABLET | Refills: 0 | Status: SHIPPED | OUTPATIENT
Start: 2022-04-20 | End: 2022-06-29 | Stop reason: SDUPTHER

## 2022-04-20 NOTE — TELEPHONE ENCOUNTER
Refill Routing Note   Medication(s) are not appropriate for processing by [facility] for the following reason(s):      - Required vitals are abnormal    [facility] action(s):  Defer          Medication reconciliation completed: No     Appointments  past 12m or future 3m with PCP    Date Provider   Last Visit   4/18/2022 Geovanni Britton MD   Next Visit   Visit date not found Geovanni Britton MD   ED visits in past 90 days: 1        Note composed:8:03 PM 04/19/2022

## 2022-04-20 NOTE — TELEPHONE ENCOUNTER
Care Due:                  Date            Visit Type   Department     Provider  --------------------------------------------------------------------------------                                [facility]                              PRIMARY      MED/ INTERNAL  Last Visit: 04-      CARE ([facility])   MED/ PEDS      Geovanni Britton  Next Visit: None Scheduled  None         None Found                                                            Last  Test          Frequency    Reason                     Performed    Due Date  --------------------------------------------------------------------------------    CMP.........  12 months..  DULoxetine, losartan.....  Not Found    Overdue    Powered by [employer]. Reference number: [id number].   4/19/2022 7:20:49 PM CDT

## 2022-04-21 ENCOUNTER — HOSPITAL ENCOUNTER (OUTPATIENT)
Dept: RADIOLOGY | Facility: HOSPITAL | Age: 47
Discharge: HOME | End: 2022-04-21
Attending: INTERNAL MEDICINE
Payer: MEDICAID

## 2022-04-21 DIAGNOSIS — M79.643 CHRONIC HAND PAIN, UNSPECIFIED LATERALITY: ICD-10-CM

## 2022-04-21 DIAGNOSIS — M13.0 POLYARTHRITIS: ICD-10-CM

## 2022-04-21 DIAGNOSIS — G89.29 CHRONIC HAND PAIN, UNSPECIFIED LATERALITY: ICD-10-CM

## 2022-04-21 PROCEDURE — 73130 XR HAND COMPLETE 3 VIEWS BILATERAL: ICD-10-PCS | Mod: 26,50,, | Performed by: RADIOLOGY

## 2022-04-21 PROCEDURE — 73130 X-RAY EXAM OF HAND: CPT | Mod: 26,50,, | Performed by: RADIOLOGY

## 2022-04-21 PROCEDURE — 73130 X-RAY EXAM OF HAND: CPT | Mod: TC,50,FY,PO

## 2022-04-21 RX ORDER — DULOXETIN HYDROCHLORIDE 30 MG/1
30 CAPSULE, DELAYED RELEASE ORAL DAILY
Qty: 30 CAPSULE | Refills: 2 | Status: CANCELLED | OUTPATIENT
Start: 2022-04-21 | End: 2023-04-21

## 2022-04-21 NOTE — TELEPHONE ENCOUNTER
No new care gaps identified.  Powered by [employer]. Reference number: [id number].   4/21/2022 8:46:04 AM CDT

## 2022-04-21 NOTE — TELEPHONE ENCOUNTER
----- Message from Amanda Arauz sent at 4/21/2022  8:37 AM CDT -----  Type: Patient Call Back       What is the request in detail:  pt states that she wants her medication tablets not capsule. Pt would also like to know around what time should she take this med.       DULoxetine (CYMBALTA) 30 MG capsule     Can the clinic reply by [facility]? No       Would the patient rather a call back or a response via [facility]? Call back       Best call back number: [phone]        Thank you.

## 2022-04-22 NOTE — TELEPHONE ENCOUNTER
----- Message from Anh Colindres sent at 4/22/2022  1:03 PM CDT -----  Type: Patient Call Back    Who called: self     What is the request in detail: Patient needs to speak with a nurse regarding her DULoxetine (CYMBALTA) 30 MG capsule. Patient would prefer if the medication was a tablet, and would also like to know what time she should take this medication.     Can the clinic reply by [facility]? No     Would the patient rather a call back or a response via [facility]? Call back     Best call back number: [phone]

## 2022-04-22 NOTE — TELEPHONE ENCOUNTER
Patient asking to change  Med to tablet form and it not available in tablet form , can capsule be open and taken.(CYMBALTA)

## 2022-04-26 ENCOUNTER — TELEPHONE (OUTPATIENT)
Dept: FAMILY MEDICINE | Facility: CLINIC | Age: 47
End: 2022-04-26
Payer: MEDICAID

## 2022-04-26 NOTE — TELEPHONE ENCOUNTER
----- Message from Geovanni Britton MD sent at 4/20/2022  6:47 AM CDT -----  Please schedule patient for a nurse blood pressure visit in 1 week approximately to evaluate the effect of her new medication.

## 2022-05-18 ENCOUNTER — TELEPHONE (OUTPATIENT)
Dept: RADIOLOGY | Facility: HOSPITAL | Age: 47
End: 2022-05-18
Payer: MEDICAID

## 2022-05-20 ENCOUNTER — OFFICE VISIT (OUTPATIENT)
Dept: HEMATOLOGY/ONCOLOGY | Facility: CLINIC | Age: 47
End: 2022-05-20
Payer: MEDICAID

## 2022-05-20 ENCOUNTER — LAB VISIT (OUTPATIENT)
Dept: LAB | Facility: HOSPITAL | Age: 47
End: 2022-05-20
Payer: MEDICAID

## 2022-05-20 ENCOUNTER — TELEPHONE (OUTPATIENT)
Dept: HEMATOLOGY/ONCOLOGY | Facility: CLINIC | Age: 47
End: 2022-05-20
Payer: MEDICAID

## 2022-05-20 ENCOUNTER — TELEPHONE (OUTPATIENT)
Dept: HEMATOLOGY/ONCOLOGY | Facility: CLINIC | Age: 47
End: 2022-05-20

## 2022-05-20 ENCOUNTER — TELEPHONE (OUTPATIENT)
Dept: FAMILY MEDICINE | Facility: CLINIC | Age: 47
End: 2022-05-20
Payer: MEDICAID

## 2022-05-20 VITALS
HEART RATE: 88 BPM | HEIGHT: 60 IN | DIASTOLIC BLOOD PRESSURE: 84 MMHG | WEIGHT: 229.94 LBS | BODY MASS INDEX: 45.14 KG/M2 | TEMPERATURE: 99 F | SYSTOLIC BLOOD PRESSURE: 178 MMHG | OXYGEN SATURATION: 96 %

## 2022-05-20 DIAGNOSIS — M13.0 POLYARTHRITIS: ICD-10-CM

## 2022-05-20 DIAGNOSIS — D64.9 SEVERE ANEMIA: ICD-10-CM

## 2022-05-20 DIAGNOSIS — D50.9 MICROCYTIC ANEMIA: Primary | ICD-10-CM

## 2022-05-20 DIAGNOSIS — D50.9 IRON DEFICIENCY ANEMIA, UNSPECIFIED IRON DEFICIENCY ANEMIA TYPE: Primary | ICD-10-CM

## 2022-05-20 DIAGNOSIS — D50.9 MICROCYTIC ANEMIA: ICD-10-CM

## 2022-05-20 DIAGNOSIS — I10 PRIMARY HYPERTENSION: ICD-10-CM

## 2022-05-20 DIAGNOSIS — D64.9 SEVERE ANEMIA: Primary | ICD-10-CM

## 2022-05-20 LAB
ABO + RH BLD: NORMAL
ALBUMIN SERPL BCP-MCNC: 3.1 G/DL (ref 3.5–5.2)
ALP SERPL-CCNC: 68 U/L (ref 55–135)
ALT SERPL W/O P-5'-P-CCNC: 7 U/L (ref 10–44)
ANION GAP SERPL CALC-SCNC: 7 MMOL/L (ref 8–16)
AST SERPL-CCNC: 12 U/L (ref 10–40)
BASOPHILS # BLD AUTO: 0.08 K/UL (ref 0–0.2)
BASOPHILS NFR BLD: 0.9 % (ref 0–1.9)
BILIRUB SERPL-MCNC: 0.8 MG/DL (ref 0.1–1)
BLD GP AB SCN CELLS X3 SERPL QL: NORMAL
BUN SERPL-MCNC: 12 MG/DL (ref 6–20)
CALCIUM SERPL-MCNC: 8.6 MG/DL (ref 8.7–10.5)
CHLORIDE SERPL-SCNC: 105 MMOL/L (ref 95–110)
CO2 SERPL-SCNC: 25 MMOL/L (ref 23–29)
CREAT SERPL-MCNC: 0.7 MG/DL (ref 0.5–1.4)
CRP SERPL-MCNC: 50.9 MG/L (ref 0–8.2)
DIFFERENTIAL METHOD: ABNORMAL
EOSINOPHIL # BLD AUTO: 0.2 K/UL (ref 0–0.5)
EOSINOPHIL NFR BLD: 2 % (ref 0–8)
ERYTHROCYTE [DISTWIDTH] IN BLOOD BY AUTOMATED COUNT: 23.6 % (ref 11.5–14.5)
ERYTHROCYTE [SEDIMENTATION RATE] IN BLOOD BY WESTERGREN METHOD: 40 MM/HR (ref 0–20)
EST. GFR  (AFRICAN AMERICAN): >60 ML/MIN/1.73 M^2
EST. GFR  (NON AFRICAN AMERICAN): >60 ML/MIN/1.73 M^2
FERRITIN SERPL-MCNC: 8 NG/ML (ref 20–300)
GLUCOSE SERPL-MCNC: 93 MG/DL (ref 70–110)
HCT VFR BLD AUTO: 26.7 % (ref 37–48.5)
HGB BLD-MCNC: 7 G/DL (ref 12–16)
IMM GRANULOCYTES # BLD AUTO: 0.03 K/UL (ref 0–0.04)
IMM GRANULOCYTES NFR BLD AUTO: 0.3 % (ref 0–0.5)
IRON SERPL-MCNC: 13 UG/DL (ref 30–160)
LYMPHOCYTES # BLD AUTO: 2 K/UL (ref 1–4.8)
LYMPHOCYTES NFR BLD: 22.6 % (ref 18–48)
MCH RBC QN AUTO: 14.6 PG (ref 27–31)
MCHC RBC AUTO-ENTMCNC: 26.2 G/DL (ref 32–36)
MCV RBC AUTO: 56 FL (ref 82–98)
MONOCYTES # BLD AUTO: 0.7 K/UL (ref 0.3–1)
MONOCYTES NFR BLD: 8 % (ref 4–15)
NEUTROPHILS # BLD AUTO: 6 K/UL (ref 1.8–7.7)
NEUTROPHILS NFR BLD: 66.2 % (ref 38–73)
NRBC BLD-RTO: 0 /100 WBC
PLATELET # BLD AUTO: 404 K/UL (ref 150–450)
PMV BLD AUTO: 8.6 FL (ref 9.2–12.9)
POTASSIUM SERPL-SCNC: 4 MMOL/L (ref 3.5–5.1)
PROT SERPL-MCNC: 7.6 G/DL (ref 6–8.4)
RBC # BLD AUTO: 4.79 M/UL (ref 4–5.4)
SATURATED IRON: 3 % (ref 20–50)
SODIUM SERPL-SCNC: 137 MMOL/L (ref 136–145)
TOTAL IRON BINDING CAPACITY: 487 UG/DL (ref 250–450)
TRANSFERRIN SERPL-MCNC: 329 MG/DL (ref 200–375)
WBC # BLD AUTO: 9 K/UL (ref 3.9–12.7)

## 2022-05-20 PROCEDURE — 82728 ASSAY OF FERRITIN: CPT

## 2022-05-20 PROCEDURE — 84466 ASSAY OF TRANSFERRIN: CPT

## 2022-05-20 PROCEDURE — 99214 OFFICE O/P EST MOD 30 MIN: CPT | Mod: PBBFAC

## 2022-05-20 PROCEDURE — 36415 COLL VENOUS BLD VENIPUNCTURE: CPT

## 2022-05-20 PROCEDURE — 3008F PR BODY MASS INDEX (BMI) DOCUMENTED: ICD-10-PCS | Mod: CPTII,,,

## 2022-05-20 PROCEDURE — 1159F PR MEDICATION LIST DOCUMENTED IN MEDICAL RECORD: ICD-10-PCS | Mod: CPTII,,,

## 2022-05-20 PROCEDURE — 1159F MED LIST DOCD IN RCRD: CPT | Mod: CPTII,,,

## 2022-05-20 PROCEDURE — 3077F PR MOST RECENT SYSTOLIC BLOOD PRESSURE >= 140 MM HG: ICD-10-PCS | Mod: CPTII,,,

## 2022-05-20 PROCEDURE — 3077F SYST BP >= 140 MM HG: CPT | Mod: CPTII,,,

## 2022-05-20 PROCEDURE — 99999 PR PBB SHADOW E&M-EST. PATIENT-LVL IV: ICD-10-PCS | Mod: PBBFAC,,,

## 2022-05-20 PROCEDURE — 1160F PR REVIEW ALL MEDS BY PRESCRIBER/CLIN PHARMACIST DOCUMENTED: ICD-10-PCS | Mod: CPTII,,,

## 2022-05-20 PROCEDURE — 85652 RBC SED RATE AUTOMATED: CPT

## 2022-05-20 PROCEDURE — 3044F PR MOST RECENT HEMOGLOBIN A1C LEVEL <7.0%: ICD-10-PCS | Mod: CPTII,,,

## 2022-05-20 PROCEDURE — 4010F PR ACE/ARB THEARPY RXD/TAKEN: ICD-10-PCS | Mod: CPTII,,,

## 2022-05-20 PROCEDURE — 99205 PR OFFICE/OUTPT VISIT, NEW, LEVL V, 60-74 MIN: ICD-10-PCS | Mod: S$PBB,,,

## 2022-05-20 PROCEDURE — 99999 PR PBB SHADOW E&M-EST. PATIENT-LVL IV: CPT | Mod: PBBFAC,,,

## 2022-05-20 PROCEDURE — 3044F HG A1C LEVEL LT 7.0%: CPT | Mod: CPTII,,,

## 2022-05-20 PROCEDURE — 3079F DIAST BP 80-89 MM HG: CPT | Mod: CPTII,,,

## 2022-05-20 PROCEDURE — 3008F BODY MASS INDEX DOCD: CPT | Mod: CPTII,,,

## 2022-05-20 PROCEDURE — 4010F ACE/ARB THERAPY RXD/TAKEN: CPT | Mod: CPTII,,,

## 2022-05-20 PROCEDURE — 85025 COMPLETE CBC W/AUTO DIFF WBC: CPT

## 2022-05-20 PROCEDURE — 86850 RBC ANTIBODY SCREEN: CPT

## 2022-05-20 PROCEDURE — 86140 C-REACTIVE PROTEIN: CPT

## 2022-05-20 PROCEDURE — 3079F PR MOST RECENT DIASTOLIC BLOOD PRESSURE 80-89 MM HG: ICD-10-PCS | Mod: CPTII,,,

## 2022-05-20 PROCEDURE — 80053 COMPREHEN METABOLIC PANEL: CPT

## 2022-05-20 PROCEDURE — 99205 OFFICE O/P NEW HI 60 MIN: CPT | Mod: S$PBB,,,

## 2022-05-20 PROCEDURE — 1160F RVW MEDS BY RX/DR IN RCRD: CPT | Mod: CPTII,,,

## 2022-05-20 RX ORDER — FERROUS SULFATE 325(65) MG
325 TABLET ORAL DAILY
Qty: 30 TABLET | Refills: 1 | Status: SHIPPED | OUTPATIENT
Start: 2022-05-20 | End: 2023-01-24

## 2022-05-20 NOTE — TELEPHONE ENCOUNTER
Urgent referral received on pt this am from PCP with a HBG of 6.8 from April 20th 22  Tc to pt requesting she present to lab immediately for urgent labs to be drawn and to present to hematology clinic as soon as labs are drawn . Informed her that if labs are critical she may  be sent to ER  She acknowledged understanding and agreed to do as advised

## 2022-05-20 NOTE — PROGRESS NOTES
Section of Hematology and Oncology  New Patient Consult     Referred by:  Dr. Geovanni Britton  Date of visit: 5/20/22    Reason for visit: Iron deficiency anemia, unspecified iron deficiency anemia type [D50.9]    History of Present Ilness:   Asha Paige (Asha) is a pleasant 46 y.o.female with HTN here today for microcytic anemia. Pt presents with boyfriend.    She reports anemia dating back to her 30s. She has never taken iron PO nor IV in the past. She has not required blood transfusions in the past. She denies menorrhagia. She reports menses lasts 5 days, not heavy, and occur monthly. She denies breakthrough bleeding. She reports she last saw her OBGYN last year. She denies epistaxis, hemoptysis, hematemesis, melena, BRBPR, and hematuria. She denies abdominal pain, dyspepsia, and changes in her bowel habits. She has not had a colonoscopy.    She reports mild fatigue. She reports pica. She denies CP, SOB, lightheadedness, dizziness. She denies cold intolerance, brittle hair/nails. She denies fever, chills, night sweats, early satiety, weight loss, lymphadenopathy.     She denies other hx of blood disorders. She denies family hx of blood disorders.    BP elevated at this visit. She explicitly denies CP, SOB, HA, lightheadedness, dizziness. She reports taking her BP medication this morning but skipped her medication the past 2 mornings.    PAST MEDICAL HISTORY:   Past Medical History:   Diagnosis Date    Allergy     Angio-edema     Eczema     History of pulmonary embolism 2005    Hypertension     Urticaria        PAST SURGICAL HISTORY:   Past Surgical History:   Procedure Laterality Date    FRACTURE SURGERY      Plate in right arm       VAGINAL DELIVERY         PAST SOCIAL HISTORY:  Social History     Tobacco Use    Smoking status: Never Smoker    Smokeless tobacco: Never Used   Substance Use Topics    Alcohol use: Yes     Comment: Social     Drug use: No       FAMILY HISTORY:  Family History    Problem Relation Age of Onset    Cancer Maternal Grandmother     Allergic rhinitis Mother     Stroke Father     Breast cancer Neg Hx     Ovarian cancer Neg Hx        CURRENT MEDICATIONS:   Current Outpatient Medications   Medication Sig    doxycycline (VIBRAMYCIN) 100 MG Cap TK 1 C PO QD FOR ACNE    DULoxetine (CYMBALTA) 30 MG capsule Take 1 capsule (30 mg total) by mouth once daily.    ergocalciferol (ERGOCALCIFEROL) 50,000 unit Cap Take 50,000 Units by mouth every 7 days.    fluticasone propionate (FLONASE) 50 mcg/actuation nasal spray 1 spray (50 mcg total) by Each Nostril route once daily.    hydrocortisone 2.5 % ointment APPLY TOPICALLY TO FACE BID PRN UTD    losartan (COZAAR) 25 MG tablet Take 1 tablet (25 mg total) by mouth once daily.    meloxicam (MOBIC) 15 MG tablet Take 1 tablet (15 mg total) by mouth once daily.    metoprolol succinate (TOPROL-XL) 100 MG 24 hr tablet TAKE 1 TABLET(100 MG) BY MOUTH EVERY DAY    cetirizine (ZYRTEC) 10 MG tablet Take 1 tablet (10 mg total) by mouth once daily.    ferrous sulfate (FEOSOL) 325 mg (65 mg iron) Tab tablet Take 1 tablet (325 mg total) by mouth once daily.    triamcinolone acetonide 0.1% (KENALOG) 0.1 % cream Apply topically 2 (two) times daily. Apply to affected areas of skin for 14 days     No current facility-administered medications for this visit.       ALLERGIES:   Review of patient's allergies indicates:   Allergen Reactions    Motrin ib [ibuprofen] Swelling     Swollen lips and knot on her head     Lisinopril Other (See Comments)     Angioedema       Review of Systems:     Review of Systems   Constitutional: Positive for malaise/fatigue. Negative for chills, diaphoresis, fever and weight loss.   HENT: Negative for nosebleeds.    Respiratory: Negative for cough, hemoptysis and shortness of breath.    Cardiovascular: Negative for chest pain and leg swelling.   Gastrointestinal: Negative for abdominal pain, blood in stool, constipation,  diarrhea, melena, nausea and vomiting.   Genitourinary: Negative for hematuria.   Neurological: Negative for dizziness and weakness.         Physical Exam:     Vitals:    05/20/22 1038   BP: (!) 178/84   Pulse: 88   Temp: 98.9 °F (37.2 °C)       Physical Exam  Constitutional:       Appearance: Normal appearance.   HENT:      Head: Normocephalic and atraumatic.   Eyes:      Extraocular Movements: Extraocular movements intact.      Conjunctiva/sclera: Conjunctivae normal.      Pupils: Pupils are equal, round, and reactive to light.   Cardiovascular:      Rate and Rhythm: Normal rate and regular rhythm.      Heart sounds: Normal heart sounds.   Pulmonary:      Effort: Pulmonary effort is normal.      Breath sounds: Normal breath sounds.   Chest:   Breasts:      Right: No supraclavicular adenopathy.      Left: No supraclavicular adenopathy.       Abdominal:      General: Abdomen is flat. There is no distension.      Palpations: Abdomen is soft. There is no hepatomegaly or splenomegaly.      Tenderness: There is no abdominal tenderness. There is no guarding.   Musculoskeletal:      Right lower leg: No edema.      Left lower leg: No edema.   Lymphadenopathy:      Head:      Right side of head: No submental, submandibular or tonsillar adenopathy.      Left side of head: No submental, submandibular or tonsillar adenopathy.      Cervical: No cervical adenopathy.      Right cervical: No superficial, deep or posterior cervical adenopathy.     Left cervical: No superficial, deep or posterior cervical adenopathy.      Upper Body:      Right upper body: No supraclavicular adenopathy.      Left upper body: No supraclavicular adenopathy.   Skin:     General: Skin is warm and dry.   Neurological:      General: No focal deficit present.      Mental Status: She is alert and oriented to person, place, and time. Mental status is at baseline.   Psychiatric:         Mood and Affect: Mood normal.         Thought Content: Thought content  normal.         Judgment: Judgment normal.          Labs:   Lab Results   Component Value Date    WBC 9.00 05/20/2022    HGB 7.0 (L) 05/20/2022    HCT 26.7 (L) 05/20/2022    MCV 56 (L) 05/20/2022     05/20/2022       Lab Results   Component Value Date     05/20/2022    K 4.0 05/20/2022     05/20/2022    CO2 25 05/20/2022    BUN 12 05/20/2022    CREATININE 0.7 05/20/2022    ALBUMIN 3.1 (L) 05/20/2022    BILITOT 0.8 05/20/2022    ALKPHOS 68 05/20/2022    AST 12 05/20/2022    ALT 7 (L) 05/20/2022       Lab Results   Component Value Date    IRON 13 (L) 05/20/2022    TIBC 487 (H) 05/20/2022    FERRITIN 8 (L) 05/20/2022       No components found for: RETICULOCYTES    Lab Results   Component Value Date     10/29/2010       Assessment and Plan:   Asha Paige (Asha) is a pleasant 46 y.o.female here today for anemia.    1. Iron deficiency anemia, unspecified iron deficiency anemia type    2. Severe anemia    3. Primary hypertension      1-2.  Hgb 6.8, ferritin 18 on 4/21/2022  Pt did not receive blood transfusion at that time.  STAT CBC and Type and Screen today: Hgb 7.0. Pt explicitly denies CP, SOB, lightheadedness, dizziness. Discussed with Dr. Jin who agrees pt can withhold transfusion at this time given pt only endorsing mild fatigue. Strict ED precautions given. Pt and boyfriend both verbalized understanding.  Will repeat Fe studies today.  Discussed PO vs IV iron risks vs benefits. Pt wishes to proceed with IV iron. Orders for Injectafer placed. Pt requesting PO iron while awaiting IV iron. Orders placed for PO iron daily. Instructed pt to stop taking PO iron when she receives her first dose of IV iron.   Encouraged pt to follow up with OBGYN.  Referral placed to GI for further evaluation and colonoscopy.    3.  BP elevated at this visit. Pt explicitly denies CP, SOB, HA, lightheadedness, dizziness. Pt reports intermittently skipping medication. Encouraged pillbox as a reminder.      Pt and boyfriend were educated on symptoms that warrant emergent evaluation in the ED. They both verbalized understanding.    Orders/Follow Up:      Orders Placed This Encounter    CBC Auto Differential    Ferritin    Iron and TIBC    Ambulatory referral/consult to Gastroenterology    ferrous sulfate (FEOSOL) 325 mg (65 mg iron) Tab tablet     Follow up 1 mo after completion of IV iron with CBC, ferritin, iron/TIBC prior.    Patient is in agreement with the proposed treatment plan. All questions were answered to the patient's satisfaction. Patient knows to call clinic for any new or worsening symptoms and if anything is needed before the next clinic visit.    Britt Wellington NP  Hematology/ Oncology    Pt discussed with collaborating physician, Dr. Kem Jin.

## 2022-05-20 NOTE — TELEPHONE ENCOUNTER
Called pt to inquire if she has ability to check BP at home. She does not have access to a cuff. Informed pt to contact PCP for further management of elevated BP today. Strict ED precautions given. Pt verbalized understanding.

## 2022-05-20 NOTE — TELEPHONE ENCOUNTER
----- Message from Geovanni Britton MD sent at 5/20/2022  6:37 AM CDT -----  Please call the patient regarding the following results:    Blood counts are severely low (Hgb less than 7) I am referring her urgently to Hematology for evaluation at this time.     Also, her other blood tests are abnormal (showing inflammation in her body) so I am also referring her to Rheumatology for evaluation.    Please contact our Referrals Coordinator at [phone] if you have not received a call within 2 business days to check on the status

## 2022-05-23 ENCOUNTER — TELEPHONE (OUTPATIENT)
Dept: FAMILY MEDICINE | Facility: CLINIC | Age: 47
End: 2022-05-23
Payer: MEDICAID

## 2022-05-24 ENCOUNTER — TELEPHONE (OUTPATIENT)
Dept: FAMILY MEDICINE | Facility: CLINIC | Age: 47
End: 2022-05-24
Payer: MEDICAID

## 2022-05-24 DIAGNOSIS — D50.9 IRON DEFICIENCY ANEMIA, UNSPECIFIED IRON DEFICIENCY ANEMIA TYPE: Primary | ICD-10-CM

## 2022-05-25 ENCOUNTER — TELEPHONE (OUTPATIENT)
Dept: HEMATOLOGY/ONCOLOGY | Facility: CLINIC | Age: 47
End: 2022-05-25
Payer: MEDICAID

## 2022-05-25 NOTE — TELEPHONE ENCOUNTER
Patient has referral placed for rheumatology by Mrs. Britt Wellington NP. Can you please assist with scheduling?     Thanks,   PARMINDER Kamara

## 2022-05-25 NOTE — TELEPHONE ENCOUNTER
----- Message from Xiomara Herron RN sent at 5/25/2022  1:35 PM CDT -----    ----- Message -----  From: Britt Wellington NP  Sent: 5/25/2022  12:23 PM CDT  To: [facility] Hem Onc Clinical Staff    Please schedule pt for follow up after injectafer with labs prior. Injectafer scheduled, thanks!

## 2022-05-26 ENCOUNTER — LAB VISIT (OUTPATIENT)
Dept: LAB | Facility: HOSPITAL | Age: 47
End: 2022-05-26
Attending: INTERNAL MEDICINE
Payer: MEDICAID

## 2022-05-26 ENCOUNTER — TELEPHONE (OUTPATIENT)
Dept: HEMATOLOGY/ONCOLOGY | Facility: CLINIC | Age: 47
End: 2022-05-26
Payer: MEDICAID

## 2022-05-26 DIAGNOSIS — D50.9 IRON DEFICIENCY ANEMIA, UNSPECIFIED IRON DEFICIENCY ANEMIA TYPE: ICD-10-CM

## 2022-05-26 LAB
BASOPHILS # BLD AUTO: 0.07 K/UL (ref 0–0.2)
BASOPHILS NFR BLD: 0.7 % (ref 0–1.9)
DIFFERENTIAL METHOD: ABNORMAL
EOSINOPHIL # BLD AUTO: 0.4 K/UL (ref 0–0.5)
EOSINOPHIL NFR BLD: 4 % (ref 0–8)
ERYTHROCYTE [DISTWIDTH] IN BLOOD BY AUTOMATED COUNT: 25.5 % (ref 11.5–14.5)
HCT VFR BLD AUTO: 27.8 % (ref 37–48.5)
HGB BLD-MCNC: 7.3 G/DL (ref 12–16)
IMM GRANULOCYTES # BLD AUTO: 0.06 K/UL (ref 0–0.04)
IMM GRANULOCYTES NFR BLD AUTO: 0.6 % (ref 0–0.5)
LYMPHOCYTES # BLD AUTO: 1.8 K/UL (ref 1–4.8)
LYMPHOCYTES NFR BLD: 18.8 % (ref 18–48)
MCH RBC QN AUTO: 15.3 PG (ref 27–31)
MCHC RBC AUTO-ENTMCNC: 26.3 G/DL (ref 32–36)
MCV RBC AUTO: 58 FL (ref 82–98)
MONOCYTES # BLD AUTO: 0.8 K/UL (ref 0.3–1)
MONOCYTES NFR BLD: 8.1 % (ref 4–15)
NEUTROPHILS # BLD AUTO: 6.3 K/UL (ref 1.8–7.7)
NEUTROPHILS NFR BLD: 67.8 % (ref 38–73)
NRBC BLD-RTO: 0 /100 WBC
PLATELET # BLD AUTO: 435 K/UL (ref 150–450)
PMV BLD AUTO: 8.9 FL (ref 9.2–12.9)
RBC # BLD AUTO: 4.78 M/UL (ref 4–5.4)
WBC # BLD AUTO: 9.35 K/UL (ref 3.9–12.7)

## 2022-05-26 PROCEDURE — 36415 COLL VENOUS BLD VENIPUNCTURE: CPT

## 2022-05-26 PROCEDURE — 85025 COMPLETE CBC W/AUTO DIFF WBC: CPT

## 2022-05-26 NOTE — TELEPHONE ENCOUNTER
Notified patient of HGB 7.3 results. Patient made aware that she still needs to have IV FE on 5/30 and 6/6/2022 and does not need to have blood transfusion and to proceed with removing bracelet. Patient acknowledge all the above.

## 2022-05-26 NOTE — TELEPHONE ENCOUNTER
----- Message from Britt Wellington NP sent at 5/26/2022  9:02 AM CDT -----  Can you let this pt know her Hgb is 7.3, so she does not need a blood transfusion please? She can remove the bracelet. Thanks!

## 2022-05-28 ENCOUNTER — LAB VISIT (OUTPATIENT)
Dept: LAB | Facility: HOSPITAL | Age: 47
End: 2022-05-28
Payer: MEDICAID

## 2022-05-28 DIAGNOSIS — D50.9 IRON DEFICIENCY ANEMIA, UNSPECIFIED: Primary | ICD-10-CM

## 2022-05-28 LAB
ABO + RH BLD: NORMAL
BASOPHILS # BLD AUTO: 0.05 K/UL (ref 0–0.2)
BASOPHILS NFR BLD: 0.7 % (ref 0–1.9)
BLD GP AB SCN CELLS X3 SERPL QL: NORMAL
DIFFERENTIAL METHOD: ABNORMAL
EOSINOPHIL # BLD AUTO: 0.4 K/UL (ref 0–0.5)
EOSINOPHIL NFR BLD: 5 % (ref 0–8)
ERYTHROCYTE [DISTWIDTH] IN BLOOD BY AUTOMATED COUNT: 27.2 % (ref 11.5–14.5)
FERRITIN SERPL-MCNC: 25 NG/ML (ref 20–300)
HCT VFR BLD AUTO: 30.4 % (ref 37–48.5)
HGB BLD-MCNC: 7.9 G/DL (ref 12–16)
IMM GRANULOCYTES # BLD AUTO: 0.05 K/UL (ref 0–0.04)
IMM GRANULOCYTES NFR BLD AUTO: 0.7 % (ref 0–0.5)
IRON SERPL-MCNC: 15 UG/DL (ref 30–160)
LYMPHOCYTES # BLD AUTO: 1.1 K/UL (ref 1–4.8)
LYMPHOCYTES NFR BLD: 14.6 % (ref 18–48)
MCH RBC QN AUTO: 15.1 PG (ref 27–31)
MCHC RBC AUTO-ENTMCNC: 26 G/DL (ref 32–36)
MCV RBC AUTO: 58 FL (ref 82–98)
MONOCYTES # BLD AUTO: 0.8 K/UL (ref 0.3–1)
MONOCYTES NFR BLD: 11.4 % (ref 4–15)
NEUTROPHILS # BLD AUTO: 4.9 K/UL (ref 1.8–7.7)
NEUTROPHILS NFR BLD: 67.6 % (ref 38–73)
NRBC BLD-RTO: 0 /100 WBC
PLATELET # BLD AUTO: 400 K/UL (ref 150–450)
PMV BLD AUTO: 8.3 FL (ref 9.2–12.9)
RBC # BLD AUTO: 5.22 M/UL (ref 4–5.4)
SATURATED IRON: 3 % (ref 20–50)
TOTAL IRON BINDING CAPACITY: 462 UG/DL (ref 250–450)
TRANSFERRIN SERPL-MCNC: 312 MG/DL (ref 200–375)
WBC # BLD AUTO: 7.18 K/UL (ref 3.9–12.7)

## 2022-05-28 PROCEDURE — 84466 ASSAY OF TRANSFERRIN: CPT

## 2022-05-28 PROCEDURE — 86850 RBC ANTIBODY SCREEN: CPT

## 2022-05-28 PROCEDURE — 85025 COMPLETE CBC W/AUTO DIFF WBC: CPT

## 2022-05-28 PROCEDURE — 82728 ASSAY OF FERRITIN: CPT

## 2022-05-28 PROCEDURE — 36415 COLL VENOUS BLD VENIPUNCTURE: CPT

## 2022-05-30 ENCOUNTER — INFUSION (OUTPATIENT)
Dept: INFUSION THERAPY | Facility: HOSPITAL | Age: 47
End: 2022-05-30
Attending: NURSE PRACTITIONER
Payer: MEDICAID

## 2022-05-30 VITALS
RESPIRATION RATE: 16 BRPM | OXYGEN SATURATION: 99 % | TEMPERATURE: 98 F | SYSTOLIC BLOOD PRESSURE: 135 MMHG | DIASTOLIC BLOOD PRESSURE: 70 MMHG | HEART RATE: 88 BPM

## 2022-05-30 DIAGNOSIS — D50.9 IRON DEFICIENCY ANEMIA, UNSPECIFIED IRON DEFICIENCY ANEMIA TYPE: Primary | ICD-10-CM

## 2022-05-30 PROCEDURE — 25000003 PHARM REV CODE 250

## 2022-05-30 PROCEDURE — 63600175 PHARM REV CODE 636 W HCPCS: Mod: JG

## 2022-05-30 PROCEDURE — 96365 THER/PROPH/DIAG IV INF INIT: CPT

## 2022-05-30 RX ADMIN — FERRIC CARBOXYMALTOSE INJECTION 750 MG: 50 INJECTION, SOLUTION INTRAVENOUS at 10:05

## 2022-05-30 NOTE — PLAN OF CARE
Pt received first initial dose of Injectafer. Two total doses ordered. Endorsing generalized fatigue today along with cravings for ice. Tolerated Injectafer well. No complaints voiced or issues noted during visit. Pt instructed to return next week for her second dose. Verbalized understanding. Left unit in NAD at time of discharge.

## 2022-06-06 ENCOUNTER — INFUSION (OUTPATIENT)
Dept: INFUSION THERAPY | Facility: HOSPITAL | Age: 47
End: 2022-06-06
Attending: NURSE PRACTITIONER
Payer: MEDICAID

## 2022-06-06 VITALS
RESPIRATION RATE: 16 BRPM | SYSTOLIC BLOOD PRESSURE: 172 MMHG | TEMPERATURE: 98 F | HEART RATE: 71 BPM | DIASTOLIC BLOOD PRESSURE: 77 MMHG | OXYGEN SATURATION: 98 %

## 2022-06-06 DIAGNOSIS — D50.9 IRON DEFICIENCY ANEMIA, UNSPECIFIED IRON DEFICIENCY ANEMIA TYPE: Primary | ICD-10-CM

## 2022-06-06 PROCEDURE — 25000003 PHARM REV CODE 250

## 2022-06-06 PROCEDURE — 96365 THER/PROPH/DIAG IV INF INIT: CPT

## 2022-06-06 PROCEDURE — 63600175 PHARM REV CODE 636 W HCPCS: Mod: JG

## 2022-06-06 RX ADMIN — FERRIC CARBOXYMALTOSE INJECTION 750 MG: 50 INJECTION, SOLUTION INTRAVENOUS at 10:06

## 2022-06-06 NOTE — PLAN OF CARE
Pt received second ordered dose of Injectafer. No issues to report after receiving her first dose last week. Injectafer tolerated well. Pt receives appointments through [facility]. Ambulated off unit in [facility].   VIEW ALL VIEW ALL

## 2022-06-07 ENCOUNTER — PATIENT OUTREACH (OUTPATIENT)
Dept: ADMINISTRATIVE | Facility: HOSPITAL | Age: 47
End: 2022-06-07
Payer: MEDICAID

## 2022-06-25 DIAGNOSIS — I10 ESSENTIAL HYPERTENSION: ICD-10-CM

## 2022-06-26 NOTE — TELEPHONE ENCOUNTER
Refill Routing Note   Medication(s) are not appropriate for processing by [facility] for the following reason(s):      - Required vitals are abnormal    [facility] action(s):  Defer          Medication reconciliation completed: No     Appointments  past 12m or future 3m with PCP    Date Provider   Last Visit   4/18/2022 Geovanni Britton MD   Next Visit   Visit date not found Geovanni Britton MD   ED visits in past 90 days: 1        Note composed:12:01 PM 06/26/2022

## 2022-06-26 NOTE — TELEPHONE ENCOUNTER
No new care gaps identified.  [facility] Embedded Care Gaps. Reference number: [id number]. 6/25/2022   9:15:28 PM CDT

## 2022-06-27 RX ORDER — METOPROLOL SUCCINATE 100 MG/1
TABLET, EXTENDED RELEASE ORAL
Qty: 30 TABLET | Refills: 0 | OUTPATIENT
Start: 2022-06-27

## 2022-06-27 NOTE — TELEPHONE ENCOUNTER
Reviewed patient's medication refill request. Patient needs to provide a recent home BP reading since per our records the last recorded blood pressure was uncontrolled. Please document in 'remote BP reading' section in Epic.      Refill pending above

## 2022-06-28 DIAGNOSIS — I10 ESSENTIAL HYPERTENSION: ICD-10-CM

## 2022-06-28 NOTE — TELEPHONE ENCOUNTER
No new care gaps identified.  [facility] Embedded Care Gaps. Reference number: [id number]. 6/28/2022   2:14:24 PM CDT

## 2022-06-28 NOTE — TELEPHONE ENCOUNTER
----- Message from Bassam Hitchcock sent at 6/28/2022  1:45 PM CDT -----  Regarding: refill  Type: RX Refill Request    Who Called: Asha     Refill or New Rx: refill     RX Name and Strength: metoprolol succinate (TOPROL-XL) 100 MG 24 hr tablet    Is this a 30 day or 90 day RX:90 day    Pref[facility] Pharm[facility] with phone [id number] [place] [address] AT [address]      Would the patient rather a call back or a response via [facility]? Call back     Best Call Back Number:[phone] (M)

## 2022-06-29 DIAGNOSIS — I10 ESSENTIAL HYPERTENSION: ICD-10-CM

## 2022-06-29 RX ORDER — METOPROLOL SUCCINATE 100 MG/1
100 TABLET, EXTENDED RELEASE ORAL DAILY
Qty: 30 TABLET | Refills: 0 | Status: SHIPPED | OUTPATIENT
Start: 2022-06-29 | End: 2022-08-07 | Stop reason: SDUPTHER

## 2022-06-29 RX ORDER — METOPROLOL SUCCINATE 100 MG/1
100 TABLET, EXTENDED RELEASE ORAL DAILY
Qty: 30 TABLET | Refills: 0 | OUTPATIENT
Start: 2022-06-29

## 2022-06-29 NOTE — TELEPHONE ENCOUNTER
No new care gaps identified.  [facility] Embedded Care Gaps. Reference number: [id number]. 6/29/2022   2:49:18 PM CDT

## 2022-06-29 NOTE — TELEPHONE ENCOUNTER
----- Message from Jovi Hdez sent at 6/29/2022  2:21 PM CDT -----  .Type: RX Refill Request    Who Called: self    Have you contacted your pharmacy:no    Refill or New Rx:refill    RX Name and Strength:metoprolol succinate (TOPROL-XL) 100 MG 24 hr tablet    Preferred Pharmacy with phone number:[id number] [place] [address]   Phone:  [phone]  Fax:  [phone]    Local or Mail Order:local    Ordering Provider:Dr. lamb    Would the patient rather a call back or a response [facility]? call    Best Call Back Number:.[phone] (home)

## 2022-06-29 NOTE — TELEPHONE ENCOUNTER
Called patient and patient states she does not have a blood pressure cuff at home and that's why she haven't been checking her pressure. Patient agreed to make appointment with PCP. PCP next available date 12/20 patient states that's to long. Offered another provider patient agreed next availability is in August patient declined stating it to long also. Patient is asking can she do a nurse visit so she can get her medication refilled and make appointment with PCP.  Please advise.

## 2022-06-29 NOTE — TELEPHONE ENCOUNTER
Patient requesting refills on Metoprolol. Patient is out of medication. Patient is unable to provide a current blood pressure due to not having a home blood pressure cuff. Medication pended and pharmacy verified.

## 2022-06-30 ENCOUNTER — CLINICAL SUPPORT (OUTPATIENT)
Dept: FAMILY MEDICINE | Facility: CLINIC | Age: 47
End: 2022-06-30
Payer: MEDICAID

## 2022-06-30 VITALS — HEART RATE: 79 BPM | OXYGEN SATURATION: 99 % | SYSTOLIC BLOOD PRESSURE: 130 MMHG | DIASTOLIC BLOOD PRESSURE: 86 MMHG

## 2022-06-30 DIAGNOSIS — I10 PRIMARY HYPERTENSION: Primary | ICD-10-CM

## 2022-06-30 PROCEDURE — 99211 OFF/OP EST MAY X REQ PHY/QHP: CPT | Mod: PBBFAC,PO

## 2022-06-30 PROCEDURE — 99999 PR PBB SHADOW E&M-EST. PATIENT-LVL I: CPT | Mod: PBBFAC,,,

## 2022-06-30 PROCEDURE — 99999 PR PBB SHADOW E&M-EST. PATIENT-LVL I: ICD-10-PCS | Mod: PBBFAC,,,

## 2022-06-30 NOTE — PROGRESS NOTES
Asha Paige 46 y.o. female is here today for Blood Pressure check.   History of HTN yes.    Review of patient's allergies indicates:   Allergen Reactions    Motrin ib [ibuprofen] Swelling     Swollen lips and knot on her head     Lisinopril Other (See Comments)     Angioedema     Creatinine   Date Value Ref Range Status   05/20/2022 0.7 0.5 - 1.4 mg/dL Final     Sodium   Date Value Ref Range Status   05/20/2022 137 136 - 145 mmol/L Final     Potassium   Date Value Ref Range Status   05/20/2022 4.0 3.5 - 5.1 mmol/L Final   ]  Patient verifies taking blood pressure medications on a regular basis at the same time of the day.     Current Outpatient Medications:     cetirizine (ZYRTEC) 10 MG tablet, Take 1 tablet (10 mg total) by mouth once daily., Disp: 30 tablet, Rfl: 2    doxycycline (VIBRAMYCIN) 100 MG Cap, TK 1 C PO QD FOR ACNE, Disp: , Rfl:     DULoxetine (CYMBALTA) 30 MG capsule, Take 1 capsule (30 mg total) by mouth once daily., Disp: 30 capsule, Rfl: 2    ergocalciferol (ERGOCALCIFEROL) 50,000 unit Cap, Take 50,000 Units by mouth every 7 days., Disp: , Rfl:     ferrous sulfate (FEOSOL) 325 mg (65 mg iron) Tab tablet, Take 1 tablet (325 mg total) by mouth once daily., Disp: 30 tablet, Rfl: 1    fluticasone propionate (FLONASE) 50 mcg/actuation nasal spray, 1 spray (50 mcg total) by Each Nostril route once daily., Disp: 16 g, Rfl: 2    hydrocortisone 2.5 % ointment, APPLY TOPICALLY TO FACE BID PRN UTD, Disp: , Rfl:     losartan (COZAAR) 25 MG tablet, Take 1 tablet (25 mg total) by mouth once daily., Disp: 90 tablet, Rfl: 0    meloxicam (MOBIC) 15 MG tablet, Take 1 tablet (15 mg total) by mouth once daily., Disp: 30 tablet, Rfl: 2    metoprolol succinate (TOPROL-XL) 100 MG 24 hr tablet, Take 1 tablet (100 mg total) by mouth once daily., Disp: 30 tablet, Rfl: 0    triamcinolone acetonide 0.1% (KENALOG) 0.1 % cream, Apply topically 2 (two) times daily. Apply to affected areas of skin for 14 days, Disp:  80 g, Rfl: 0  Does patient have record of home blood pressure readings no.  Last dose of blood pressure medication was taken at 8am  Patient is asymptomatic.   Complains of none    Vitals:    06/30/22 1352   BP: 130/86   BP Location: Left arm   Patient Position: Sitting   BP Method: Large (Manual)   Pulse: 79   SpO2: 99%         Dr. Britton informed of nurse visit.

## 2022-07-09 ENCOUNTER — PATIENT MESSAGE (OUTPATIENT)
Dept: FAMILY MEDICINE | Facility: CLINIC | Age: 47
End: 2022-07-09
Payer: MEDICAID

## 2022-07-09 DIAGNOSIS — M17.0 PRIMARY OSTEOARTHRITIS OF BOTH KNEES: Primary | ICD-10-CM

## 2022-08-09 ENCOUNTER — PATIENT MESSAGE (OUTPATIENT)
Dept: FAMILY MEDICINE | Facility: CLINIC | Age: 47
End: 2022-08-09
Payer: MEDICAID

## 2022-08-24 DIAGNOSIS — M25.562 PAIN IN BOTH KNEES, UNSPECIFIED CHRONICITY: Primary | ICD-10-CM

## 2022-08-24 DIAGNOSIS — M25.561 PAIN IN BOTH KNEES, UNSPECIFIED CHRONICITY: Primary | ICD-10-CM

## 2022-09-14 ENCOUNTER — HOSPITAL ENCOUNTER (OUTPATIENT)
Dept: RADIOLOGY | Facility: HOSPITAL | Age: 47
Discharge: HOME | End: 2022-09-14
Attending: INTERNAL MEDICINE
Payer: MEDICAID

## 2022-09-14 ENCOUNTER — OFFICE VISIT (OUTPATIENT)
Dept: RHEUMATOLOGY | Facility: CLINIC | Age: 47
End: 2022-09-14
Payer: MEDICAID

## 2022-09-14 VITALS
HEART RATE: 78 BPM | BODY MASS INDEX: 46.79 KG/M2 | WEIGHT: 238.31 LBS | OXYGEN SATURATION: 99 % | SYSTOLIC BLOOD PRESSURE: 175 MMHG | DIASTOLIC BLOOD PRESSURE: 95 MMHG | HEIGHT: 60 IN | RESPIRATION RATE: 20 BRPM

## 2022-09-14 DIAGNOSIS — M06.9 RHEUMATOID ARTHRITIS, INVOLVING UNSPECIFIED SITE, UNSPECIFIED WHETHER RHEUMATOID FACTOR PRESENT: Primary | ICD-10-CM

## 2022-09-14 DIAGNOSIS — E66.01 MORBID OBESITY: ICD-10-CM

## 2022-09-14 DIAGNOSIS — M06.9 RHEUMATOID ARTHRITIS, INVOLVING UNSPECIFIED SITE, UNSPECIFIED WHETHER RHEUMATOID FACTOR PRESENT: ICD-10-CM

## 2022-09-14 DIAGNOSIS — Z71.89 COUNSELING AND COORDINATION OF CARE: ICD-10-CM

## 2022-09-14 DIAGNOSIS — Z79.899 ENCOUNTER FOR LONG-TERM (CURRENT) USE OF OTHER MEDICATIONS: ICD-10-CM

## 2022-09-14 PROCEDURE — 3080F PR MOST RECENT DIASTOLIC BLOOD PRESSURE >= 90 MM HG: ICD-10-PCS | Mod: CPTII,,, | Performed by: INTERNAL MEDICINE

## 2022-09-14 PROCEDURE — 4010F PR ACE/ARB THEARPY RXD/TAKEN: ICD-10-PCS | Mod: CPTII,,, | Performed by: INTERNAL MEDICINE

## 2022-09-14 PROCEDURE — 4010F ACE/ARB THERAPY RXD/TAKEN: CPT | Mod: CPTII,,, | Performed by: INTERNAL MEDICINE

## 2022-09-14 PROCEDURE — 1159F MED LIST DOCD IN RCRD: CPT | Mod: CPTII,,, | Performed by: INTERNAL MEDICINE

## 2022-09-14 PROCEDURE — 3080F DIAST BP >= 90 MM HG: CPT | Mod: CPTII,,, | Performed by: INTERNAL MEDICINE

## 2022-09-14 PROCEDURE — 3077F SYST BP >= 140 MM HG: CPT | Mod: CPTII,,, | Performed by: INTERNAL MEDICINE

## 2022-09-14 PROCEDURE — 99214 OFFICE O/P EST MOD 30 MIN: CPT | Mod: S$PBB,,, | Performed by: INTERNAL MEDICINE

## 2022-09-14 PROCEDURE — 3008F BODY MASS INDEX DOCD: CPT | Mod: CPTII,,, | Performed by: INTERNAL MEDICINE

## 2022-09-14 PROCEDURE — 99214 OFFICE O/P EST MOD 30 MIN: CPT | Mod: PBBFAC,PN | Performed by: INTERNAL MEDICINE

## 2022-09-14 PROCEDURE — 3044F PR MOST RECENT HEMOGLOBIN A1C LEVEL <7.0%: ICD-10-PCS | Mod: CPTII,,, | Performed by: INTERNAL MEDICINE

## 2022-09-14 PROCEDURE — 99999 PR PBB SHADOW E&M-EST. PATIENT-LVL IV: CPT | Mod: PBBFAC,,, | Performed by: INTERNAL MEDICINE

## 2022-09-14 PROCEDURE — 99214 PR OFFICE/OUTPT VISIT, EST, LEVL IV, 30-39 MIN: ICD-10-PCS | Mod: S$PBB,,, | Performed by: INTERNAL MEDICINE

## 2022-09-14 PROCEDURE — 77077 JOINT SURVEY SINGLE VIEW: CPT | Mod: TC

## 2022-09-14 PROCEDURE — 3008F PR BODY MASS INDEX (BMI) DOCUMENTED: ICD-10-PCS | Mod: CPTII,,, | Performed by: INTERNAL MEDICINE

## 2022-09-14 PROCEDURE — 1159F PR MEDICATION LIST DOCUMENTED IN MEDICAL RECORD: ICD-10-PCS | Mod: CPTII,,, | Performed by: INTERNAL MEDICINE

## 2022-09-14 PROCEDURE — 99999 PR PBB SHADOW E&M-EST. PATIENT-LVL IV: ICD-10-PCS | Mod: PBBFAC,,, | Performed by: INTERNAL MEDICINE

## 2022-09-14 PROCEDURE — 3077F PR MOST RECENT SYSTOLIC BLOOD PRESSURE >= 140 MM HG: ICD-10-PCS | Mod: CPTII,,, | Performed by: INTERNAL MEDICINE

## 2022-09-14 PROCEDURE — 77077 JOINT SURVEY SINGLE VIEW: CPT | Mod: 26,,, | Performed by: RADIOLOGY

## 2022-09-14 PROCEDURE — 3044F HG A1C LEVEL LT 7.0%: CPT | Mod: CPTII,,, | Performed by: INTERNAL MEDICINE

## 2022-09-14 PROCEDURE — 77077 XR ARTHRITIS SURVEY: ICD-10-PCS | Mod: 26,,, | Performed by: RADIOLOGY

## 2022-09-14 RX ORDER — HYDROXYCHLOROQUINE SULFATE 200 MG/1
200 TABLET, FILM COATED ORAL 2 TIMES DAILY
Qty: 60 TABLET | Refills: 6 | Status: SHIPPED | OUTPATIENT
Start: 2022-09-14 | End: 2023-09-08 | Stop reason: SDUPTHER

## 2022-09-14 RX ORDER — MELOXICAM 15 MG/1
15 TABLET ORAL DAILY
Qty: 30 TABLET | Refills: 6 | Status: SHIPPED | OUTPATIENT
Start: 2022-09-14 | End: 2022-12-20 | Stop reason: SDUPTHER

## 2022-09-14 NOTE — PROGRESS NOTES
RHEUMATOLOGY OUTPATIENT CLINIC NOTE    9/14/2022    Attending Rheumatologist: Harsh Damian  Primary Care Provider: Geovanni Britton MD   Physician Requesting Consultation: Geovanni Britton MD  [address] [facility]  [place] [address]  Chief Complaint/Reason For Consultation:  No chief complaint on file.      Subjective:       HPI  Asha Paige is a 46 y.o. Black or  female with medical history noted below who presents for evaluation of joint pain.     Patient presents for evaluation of joint pain. She notes the onset of joint pain over 3 years ago. It started in the shoulders and has progressed to affect most of her joints. Notes trying CSI to shoulder with relief when it first started. She c/o swelling in the ankles and hands. Morning stiffness lasting all day. She notes as she moves pain lighten up a bit, when she is still or laying down she stiffens up. Has tried OTC NSAIDs and Tylenol with no relief. On Meloxicam and Cymbalta with little relief. +Fatigue. No wt loss, chills or fevers, rash. Dad with RA.     Review of Systems   Constitutional:  Positive for fatigue. Negative for chills, fever and unexpected weight change.   HENT:  Negative for mouth sores.    Eyes:  Negative for redness and eye dryness.   Respiratory:  Negative for cough and shortness of breath.    Cardiovascular:  Negative for chest pain.   Gastrointestinal:  Negative for abdominal distention, constipation, diarrhea, nausea and vomiting.   Genitourinary:  Negative for vaginal dryness.   Musculoskeletal:  Positive for arthralgias, joint swelling, leg pain and myalgias. Negative for back pain, gait problem, neck pain, neck stiffness and joint deformity.   Integumentary:  Negative for rash.   Neurological:  Negative for weakness, numbness and headaches.   Hematological:  Negative for adenopathy. Does not bruise/bleed easily.   Psychiatric/Behavioral:  Negative for confusion, decreased concentration and sleep disturbance.  The patient is not nervous/anxious.    All other systems reviewed and are negative.     Chronic comorbid conditions affecting medical decision making today:  Past Medical History:   Diagnosis Date    Allergy     Angio-edema     Eczema     History of pulmonary embolism 2005    Hypertension     Urticaria      Past Surgical History:   Procedure Laterality Date    FRACTURE SURGERY      Plate in right arm       VAGINAL DELIVERY       Family History   Problem Relation Age of Onset    Cancer Maternal Grandmother     Allergic rhinitis Mother     Stroke Father     Breast cancer Neg Hx     Ovarian cancer Neg Hx      Social History     Substance and Sexual Activity   Alcohol Use Yes    Comment: Social      Social History     Tobacco Use   Smoking Status Never   Smokeless Tobacco Never     Social History     Substance and Sexual Activity   Drug Use No       Current Outpatient Medications:     cetirizine (ZYRTEC) 10 MG tablet, Take 1 tablet (10 mg total) by mouth once daily., Disp: 30 tablet, Rfl: 2    doxycycline (VIBRAMYCIN) 100 MG Cap, TK 1 C PO QD FOR ACNE, Disp: , Rfl:     DULoxetine (CYMBALTA) 30 MG capsule, TAKE 1 CAPSULE(30 MG) BY MOUTH EVERY DAY, Disp: 30 capsule, Rfl: 2    ergocalciferol (ERGOCALCIFEROL) 50,000 unit Cap, Take 50,000 Units by mouth every 7 days., Disp: , Rfl:     ferrous sulfate (FEOSOL) 325 mg (65 mg iron) Tab tablet, Take 1 tablet (325 mg total) by mouth once daily., Disp: 30 tablet, Rfl: 1    fluticasone propionate (FLONASE) 50 mcg/actuation nasal spray, 1 spray (50 mcg total) by Each Nostril route once daily., Disp: 16 g, Rfl: 2    hydrocortisone 2.5 % ointment, APPLY TOPICALLY TO FACE BID PRN UTD, Disp: , Rfl:     hydrOXYchloroQUINE (PLAQUENIL) 200 mg tablet, Take 1 tablet (200 mg total) by mouth 2 (two) times daily., Disp: 60 tablet, Rfl: 6    losartan (COZAAR) 25 MG tablet, Take 1 tablet (25 mg total) by mouth once daily., Disp: 90 tablet, Rfl: 0    meloxicam (MOBIC) 15 MG tablet, Take 1 tablet  (15 mg total) by mouth once daily., Disp: 30 tablet, Rfl: 6    metoprolol succinate (TOPROL-XL) 100 MG 24 hr tablet, TAKE 1 TABLET(100 MG) BY MOUTH EVERY DAY, Disp: 90 tablet, Rfl: 2    triamcinolone acetonide 0.1% (KENALOG) 0.1 % cream, Apply topically 2 (two) times daily. Apply to affected areas of skin for 14 days, Disp: 80 g, Rfl: 0     Objective:         Vitals:    09/14/22 0957   BP: (!) 175/95   Pulse: 78   Resp: 20     Physical Exam  Can make fist, no synovitis  Shoulder ROM ok  Knee crepitus, no effusion  Negative ankle/MTP  No tender points     Reviewed old and all outside pertinent medical records available.    All lab results personally reviewed and interpreted by me.  Lab Results   Component Value Date    WBC 7.18 05/28/2022    HGB 7.9 (L) 05/28/2022    HCT 30.4 (L) 05/28/2022    MCV 58 (L) 05/28/2022    MCH 15.1 (L) 05/28/2022    MCHC 26.0 (L) 05/28/2022    RDW 27.2 (H) 05/28/2022     05/28/2022    MPV 8.3 (L) 05/28/2022    PLTEST Increased (A) 07/26/2006       Lab Results   Component Value Date     05/20/2022    K 4.0 05/20/2022     05/20/2022    CO2 25 05/20/2022    GLU 93 05/20/2022    BUN 12 05/20/2022    CALCIUM 8.6 (L) 05/20/2022    PROT 7.6 05/20/2022    ALBUMIN 3.1 (L) 05/20/2022    BILITOT 0.8 05/20/2022    AST 12 05/20/2022    ALKPHOS 68 05/20/2022    ALT 7 (L) 05/20/2022       Lab Results   Component Value Date    COLORU Yellow 01/25/2019    APPEARANCEUA Clear 01/25/2019    SPECGRAV 1.020 01/25/2019    PHUR 8.0 01/25/2019    PROTEINUA Negative 01/25/2019    KETONESU Negative 01/25/2019    LEUKOCYTESUR Trace (A) 01/25/2019    NITRITE Negative 01/25/2019    UROBILINOGEN Negative 10/06/2015       Lab Results   Component Value Date    CRP 50.9 (H) 05/20/2022       Lab Results   Component Value Date    SEDRATE 40 (H) 05/20/2022       Lab Results   Component Value Date    RF 14.0 04/21/2022    SEDRATE 40 (H) 05/20/2022       No components found for: 25OHVITDTOT, 05NABOCJ1,  52VGZBBE1, METHODNOTE    Lab Results   Component Value Date    URICACID 6.7 (H) 10/29/2010       No components found for: TSPOTTB        Imaging:  All imaging reviewed and independently interpreted by me.         ASSESSMENT / PLAN:     Asha Paige is a 46 y.o. Black or  female with:      1. Rheumatoid arthritis, involving unspecified site, unspecified whether rheumatoid factor present  - patient with RA (+CCP, FHX of RA, Joint Pain and swelling)  - discussed diagnosis and management  - start HCQ 400mg daily  - continue Meloxicam   - labs and imaging as below  - reassurance   - C-Reactive Protein; Future  - CBC Auto Differential; Future  - Comprehensive Metabolic Panel; Future  - Sedimentation rate; Future  - Hepatitis B Surface Ab, Qualitative; Future  - Vitamin D; Future  - Uric Acid; Future  - Hepatitis C Antibody; Future  - Hepatitis B Surface Antigen; Future  - CK; Future  - XR Arthritis Survey; Future  - Quantiferon Gold TB; Future  - HIV 1/2 Ag/Ab (4th Gen); Future  - hydrOXYchloroQUINE (PLAQUENIL) 200 mg tablet; Take 1 tablet (200 mg total) by mouth 2 (two) times daily.  Dispense: 60 tablet; Refill: 6    2. Encounter for long-term (current) use of other medications  - annual EYE exam     3. Other specified counseling  - over 10 minutes spent regarding below topics:  - Immunization counseling done.  - Weight loss counseling done.  - Nutrition and exercise counseling.  - Limitation of alcohol consumption.  - Regular exercise:  Aerobic and resistance.  - Medication counseling provided.    4. Morbid Obesity  - would benefit from decreasing at least 10% of body weight.  - recommended goal of losing 1 lb per week.  - consider nutritionist evaluation.      Follow up in about 3 months (around 12/14/2022).    Method of contact with patient concerns: Kin attn Rheumatology    Disclaimer:  This note is prepared using voice recognition software and as such is likely to have errors and has not been  proof read. Please contact me for questions.     Time spent: 45 minutes in face to face discussion concerning diagnosis, prognosis, review of lab and test results, benefits of treatment as well as management of disease, counseling of patient and coordination of care between various health care providers.  Greater than half the time spent was used for coordination of care and counseling of patient.    Harsh Damian M.D.  Rheumatology Department   [facility]

## 2022-10-11 ENCOUNTER — PATIENT MESSAGE (OUTPATIENT)
Dept: ADMINISTRATIVE | Facility: HOSPITAL | Age: 47
End: 2022-10-11
Payer: MEDICAID

## 2022-10-26 ENCOUNTER — PATIENT MESSAGE (OUTPATIENT)
Dept: FAMILY MEDICINE | Facility: CLINIC | Age: 47
End: 2022-10-26
Payer: MEDICAID

## 2022-10-27 ENCOUNTER — PATIENT MESSAGE (OUTPATIENT)
Dept: FAMILY MEDICINE | Facility: CLINIC | Age: 47
End: 2022-10-27
Payer: MEDICAID

## 2022-10-27 RX ORDER — ESCITALOPRAM OXALATE 20 MG/1
20 TABLET ORAL DAILY
Qty: 90 TABLET | Refills: 1 | Status: SHIPPED | OUTPATIENT
Start: 2022-10-27 | End: 2022-12-20 | Stop reason: SDUPTHER

## 2022-10-27 RX ORDER — ESCITALOPRAM OXALATE 20 MG/1
TABLET ORAL
COMMUNITY
Start: 2022-06-17 | End: 2022-10-27 | Stop reason: SDUPTHER

## 2022-10-27 NOTE — TELEPHONE ENCOUNTER
No new care gaps identified.  [facility] Embedded Care Gaps. Reference number: [id number]. 10/27/2022   7:06:21 AM CDT

## 2022-11-22 ENCOUNTER — PATIENT MESSAGE (OUTPATIENT)
Dept: ADMINISTRATIVE | Facility: HOSPITAL | Age: 47
End: 2022-11-22
Payer: MEDICAID

## 2022-12-20 DIAGNOSIS — Z79.899 ENCOUNTER FOR LONG-TERM (CURRENT) USE OF OTHER MEDICATIONS: ICD-10-CM

## 2022-12-20 DIAGNOSIS — I10 ESSENTIAL HYPERTENSION: ICD-10-CM

## 2022-12-20 RX ORDER — METOPROLOL SUCCINATE 100 MG/1
100 TABLET, EXTENDED RELEASE ORAL DAILY
Qty: 90 TABLET | Refills: 0 | Status: SHIPPED | OUTPATIENT
Start: 2022-12-20 | End: 2023-04-10

## 2022-12-20 RX ORDER — ESCITALOPRAM OXALATE 20 MG/1
20 TABLET ORAL DAILY
Qty: 90 TABLET | Refills: 0 | Status: SHIPPED | OUTPATIENT
Start: 2022-12-20 | End: 2023-01-24

## 2022-12-20 RX ORDER — MELOXICAM 15 MG/1
15 TABLET ORAL DAILY PRN
Qty: 30 TABLET | Refills: 0 | Status: SHIPPED | OUTPATIENT
Start: 2022-12-20 | End: 2024-03-12

## 2022-12-20 NOTE — TELEPHONE ENCOUNTER
Patient was called and rescheduled appointment for 12/20/22 ;due to provider call out ; to 1/24/2023. Patient office visit for today was to get medications refill and would need office visit to get refill. Patient will be out of medication before 1/24/2023.

## 2022-12-20 NOTE — TELEPHONE ENCOUNTER
No new care gaps identified.  [facility] Embedded Care Gaps. Reference number: [id number]. 12/20/2022   11:47:31 AM CST

## 2023-01-09 ENCOUNTER — PATIENT MESSAGE (OUTPATIENT)
Dept: ADMINISTRATIVE | Facility: HOSPITAL | Age: 48
End: 2023-01-09
Payer: MEDICAID

## 2023-01-24 ENCOUNTER — OFFICE VISIT (OUTPATIENT)
Dept: FAMILY MEDICINE | Facility: CLINIC | Age: 48
End: 2023-01-24
Payer: MEDICAID

## 2023-01-24 ENCOUNTER — LAB VISIT (OUTPATIENT)
Dept: LAB | Facility: HOSPITAL | Age: 48
End: 2023-01-24
Attending: INTERNAL MEDICINE
Payer: MEDICAID

## 2023-01-24 VITALS
DIASTOLIC BLOOD PRESSURE: 84 MMHG | HEART RATE: 60 BPM | BODY MASS INDEX: 45.28 KG/M2 | TEMPERATURE: 98 F | HEIGHT: 60 IN | WEIGHT: 230.63 LBS | SYSTOLIC BLOOD PRESSURE: 138 MMHG | OXYGEN SATURATION: 95 %

## 2023-01-24 DIAGNOSIS — M13.0 POLYARTHRITIS: ICD-10-CM

## 2023-01-24 DIAGNOSIS — Z12.11 COLON CANCER SCREENING: ICD-10-CM

## 2023-01-24 DIAGNOSIS — M22.2X2 PATELLOFEMORAL PAIN SYNDROME OF BOTH KNEES: ICD-10-CM

## 2023-01-24 DIAGNOSIS — M22.2X1 PATELLOFEMORAL PAIN SYNDROME OF BOTH KNEES: ICD-10-CM

## 2023-01-24 DIAGNOSIS — N39.46 MIXED INCONTINENCE URGE AND STRESS: ICD-10-CM

## 2023-01-24 DIAGNOSIS — E66.01 CLASS 3 OBESITY: ICD-10-CM

## 2023-01-24 DIAGNOSIS — F41.9 ANXIETY: ICD-10-CM

## 2023-01-24 DIAGNOSIS — Z00.00 ROUTINE ADULT HEALTH MAINTENANCE: Primary | ICD-10-CM

## 2023-01-24 DIAGNOSIS — I10 ESSENTIAL HYPERTENSION: ICD-10-CM

## 2023-01-24 DIAGNOSIS — Z13.220 SCREENING FOR LIPID DISORDERS: ICD-10-CM

## 2023-01-24 DIAGNOSIS — F51.04 PSYCHOPHYSIOLOGICAL INSOMNIA: ICD-10-CM

## 2023-01-24 DIAGNOSIS — Z79.899 LONG-TERM USE OF PLAQUENIL: ICD-10-CM

## 2023-01-24 LAB
CHOLEST SERPL-MCNC: 140 MG/DL (ref 120–199)
CHOLEST/HDLC SERPL: 3.3 {RATIO} (ref 2–5)
HDLC SERPL-MCNC: 42 MG/DL (ref 40–75)
HDLC SERPL: 30 % (ref 20–50)
LDLC SERPL CALC-MCNC: 87.8 MG/DL (ref 63–159)
NONHDLC SERPL-MCNC: 98 MG/DL
TRIGL SERPL-MCNC: 51 MG/DL (ref 30–150)

## 2023-01-24 PROCEDURE — 99999 PR PBB SHADOW E&M-EST. PATIENT-LVL V: ICD-10-PCS | Mod: PBBFAC,,, | Performed by: INTERNAL MEDICINE

## 2023-01-24 PROCEDURE — 3075F PR MOST RECENT SYSTOLIC BLOOD PRESS GE 130-139MM HG: ICD-10-PCS | Mod: CPTII,,, | Performed by: INTERNAL MEDICINE

## 2023-01-24 PROCEDURE — 36415 COLL VENOUS BLD VENIPUNCTURE: CPT | Mod: PO | Performed by: INTERNAL MEDICINE

## 2023-01-24 PROCEDURE — 3075F SYST BP GE 130 - 139MM HG: CPT | Mod: CPTII,,, | Performed by: INTERNAL MEDICINE

## 2023-01-24 PROCEDURE — 3008F BODY MASS INDEX DOCD: CPT | Mod: CPTII,,, | Performed by: INTERNAL MEDICINE

## 2023-01-24 PROCEDURE — 99396 PR PREVENTIVE VISIT,EST,40-64: ICD-10-PCS | Mod: S$PBB,,, | Performed by: INTERNAL MEDICINE

## 2023-01-24 PROCEDURE — 99999 PR PBB SHADOW E&M-EST. PATIENT-LVL V: CPT | Mod: PBBFAC,,, | Performed by: INTERNAL MEDICINE

## 2023-01-24 PROCEDURE — 99214 OFFICE O/P EST MOD 30 MIN: CPT | Mod: 25,S$PBB,, | Performed by: INTERNAL MEDICINE

## 2023-01-24 PROCEDURE — 3008F PR BODY MASS INDEX (BMI) DOCUMENTED: ICD-10-PCS | Mod: CPTII,,, | Performed by: INTERNAL MEDICINE

## 2023-01-24 PROCEDURE — 99214 PR OFFICE/OUTPT VISIT, EST, LEVL IV, 30-39 MIN: ICD-10-PCS | Mod: 25,S$PBB,, | Performed by: INTERNAL MEDICINE

## 2023-01-24 PROCEDURE — 99396 PREV VISIT EST AGE 40-64: CPT | Mod: S$PBB,,, | Performed by: INTERNAL MEDICINE

## 2023-01-24 PROCEDURE — 99215 OFFICE O/P EST HI 40 MIN: CPT | Mod: PBBFAC,PO | Performed by: INTERNAL MEDICINE

## 2023-01-24 PROCEDURE — 3079F DIAST BP 80-89 MM HG: CPT | Mod: CPTII,,, | Performed by: INTERNAL MEDICINE

## 2023-01-24 PROCEDURE — 1159F PR MEDICATION LIST DOCUMENTED IN MEDICAL RECORD: ICD-10-PCS | Mod: CPTII,,, | Performed by: INTERNAL MEDICINE

## 2023-01-24 PROCEDURE — 1159F MED LIST DOCD IN RCRD: CPT | Mod: CPTII,,, | Performed by: INTERNAL MEDICINE

## 2023-01-24 PROCEDURE — 4010F PR ACE/ARB THEARPY RXD/TAKEN: ICD-10-PCS | Mod: CPTII,,, | Performed by: INTERNAL MEDICINE

## 2023-01-24 PROCEDURE — 3079F PR MOST RECENT DIASTOLIC BLOOD PRESSURE 80-89 MM HG: ICD-10-PCS | Mod: CPTII,,, | Performed by: INTERNAL MEDICINE

## 2023-01-24 PROCEDURE — 4010F ACE/ARB THERAPY RXD/TAKEN: CPT | Mod: CPTII,,, | Performed by: INTERNAL MEDICINE

## 2023-01-24 PROCEDURE — 80061 LIPID PANEL: CPT | Performed by: INTERNAL MEDICINE

## 2023-01-24 RX ORDER — IRBESARTAN 150 MG/1
150 TABLET ORAL NIGHTLY
Qty: 30 TABLET | Refills: 2 | Status: SHIPPED | OUTPATIENT
Start: 2023-01-24 | End: 2024-03-29

## 2023-01-24 NOTE — PROGRESS NOTES
Chief Complaint  Chief Complaint   Patient presents with    Follow-up     Routine check up        HPI  Asha Paige is a 47 y.o. female with multiple medical diagnoses as listed in the medical history and problem list that presents for annual exam.  Their last appointment with primary care was 4/18/2022.    Ms. Paige reports she no longer takes losartan due to a sharp pain sensation in her tongue whenever she takes it. She last took the medication months ago. She currently takes only metoprolol at night for hypertension.     Ms. Pagie saw rheumatology 9/14/2022 and diagnosed with rheumatoid arthritis. She states hydroxychloroquine helps with all her joint pain, except for her knees. She rates the knee pain as 6/10 and describes the pain as aching. The pain does not get better or worse with rest or activity. Ms. Paige states the knee pain is located above and behind the patella. She reports no pain upon palpation along the joint line. Ms. Paige reports taking meloxicam occasionally as it does not help with the pain.    Ms. Paige has insomnia. She finds it difficult to fall asleep and stay asleep. She reports sleeping about 4-5 hours a day, and typically sleeps during the day. She states she takes her lexapro at night. Lexapro will be switched to daytime. Trazodone was considered; however, there is interaction with hydroxychloroquine.     In addition to trouble sleeping, she states she has urinary incontinence for years. Ms. Paige has some urinary leakage when trying to get to the bathroom and when she coughs or sneezes. She does not use a pad. Ms. Paige reports going to the bathroom 3-4 times at night.     Ms. Paige states she eats about 1 meal a day and goes to the gym Monday - Friday. She reports not wanting the flu and COVID vaccine due to her youngest son being hospitalized shortly after receiving the flu vaccine.       PAST MEDICAL HISTORY:  Past Medical History:   Diagnosis Date    Allergy     Angio-edema      Eczema     History of pulmonary embolism 2005    Hypertension     Urticaria        PAST SURGICAL HISTORY:  Past Surgical History:   Procedure Laterality Date    FRACTURE SURGERY      Plate in right arm       VAGINAL DELIVERY         SOCIAL HISTORY:  Social History     Socioeconomic History    Marital status: Single   Tobacco Use    Smoking status: Never    Smokeless tobacco: Never   Substance and Sexual Activity    Alcohol use: Yes     Comment: Social     Drug use: No    Sexual activity: Yes     Partners: Male       FAMILY HISTORY:  Family History   Problem Relation Age of Onset    Cancer Maternal Grandmother     Allergic rhinitis Mother     Stroke Father     Breast cancer Neg Hx     Ovarian cancer Neg Hx        ALLERGIES AND MEDICATIONS: updated and reviewed.  Review of patient's allergies indicates:   Allergen Reactions    Motrin ib [ibuprofen] Swelling     Swollen lips and knot on her head     Lisinopril Other (See Comments)     Angioedema     Current Outpatient Medications   Medication Sig Dispense Refill    EScitalopram oxalate (LEXAPRO) 20 MG tablet TAKE 1 TABLET(20 MG) BY MOUTH EVERY EVENING 90 tablet 1    hydrOXYchloroQUINE (PLAQUENIL) 200 mg tablet Take 1 tablet (200 mg total) by mouth 2 (two) times daily. 60 tablet 6    meloxicam (MOBIC) 15 MG tablet Take 1 tablet (15 mg total) by mouth daily as needed for Pain. 30 tablet 0    metoprolol succinate (TOPROL-XL) 100 MG 24 hr tablet Take 1 tablet (100 mg total) by mouth once daily. 90 tablet 0    cetirizine (ZYRTEC) 10 MG tablet Take 1 tablet (10 mg total) by mouth once daily. 30 tablet 2    doxycycline (VIBRAMYCIN) 100 MG Cap TK 1 C PO QD FOR ACNE      ergocalciferol (ERGOCALCIFEROL) 50,000 unit Cap Take 50,000 Units by mouth every 7 days.      hydrocortisone 2.5 % ointment APPLY TOPICALLY TO FACE BID PRN UTD      irbesartan (AVAPRO) 150 MG tablet Take 1 tablet (150 mg total) by mouth every evening. 30 tablet 2    triamcinolone acetonide 0.1% (KENALOG) 0.1 %  cream Apply topically 2 (two) times daily. Apply to affected areas of skin for 14 days 80 g 0     No current facility-administered medications for this visit.         ROS  Review of Systems   Constitutional:  Negative for activity change, appetite change and fever.   HENT:  Negative for congestion and ear pain.    Eyes:  Negative for pain.   Respiratory:  Negative for cough and shortness of breath.    Cardiovascular:  Negative for chest pain, palpitations and leg swelling.   Gastrointestinal:  Negative for abdominal pain, constipation and diarrhea.   Genitourinary:  Positive for urgency. Negative for difficulty urinating, dyspareunia and dysuria.   Musculoskeletal:  Positive for arthralgias.   Skin:  Negative for rash.   Psychiatric/Behavioral:  Positive for sleep disturbance.          Physical Exam  Vitals:    01/24/23 0848   BP: 138/84   Pulse: 60   Temp: 98.1 °F (36.7 °C)   TempSrc: Oral   SpO2: 95%   Weight: 104.6 kg (230 lb 9.6 oz)   Height: 5' (1.524 m)    Body mass index is 45.04 kg/m².  Weight: 104.6 kg (230 lb 9.6 oz)   Height: 5' (152.4 cm)   Physical Exam  Constitutional:       General: She is not in acute distress.     Appearance: Normal appearance. She is not ill-appearing.   HENT:      Head: Normocephalic and atraumatic.      Right Ear: Tympanic membrane and external ear normal.      Left Ear: Tympanic membrane and external ear normal.      Nose: Nose normal. No congestion.      Mouth/Throat:      Mouth: Mucous membranes are moist.      Pharynx: Oropharynx is clear.   Eyes:      Extraocular Movements: Extraocular movements intact.      Conjunctiva/sclera: Conjunctivae normal.      Pupils: Pupils are equal, round, and reactive to light.   Cardiovascular:      Rate and Rhythm: Normal rate and regular rhythm.      Pulses: Normal pulses.      Heart sounds: No murmur heard.  Pulmonary:      Effort: Pulmonary effort is normal. No respiratory distress.      Breath sounds: Normal breath sounds. No stridor. No  wheezing.   Abdominal:      General: Bowel sounds are normal.      Palpations: Abdomen is soft.      Tenderness: There is no abdominal tenderness.   Musculoskeletal:         General: No swelling. Normal range of motion.      Cervical back: Normal range of motion.      Right knee: Tenderness present.      Left knee: Tenderness present.   Neurological:      General: No focal deficit present.      Mental Status: She is alert and oriented to person, place, and time. Mental status is at baseline.   Psychiatric:         Mood and Affect: Mood normal.         Behavior: Behavior normal.         Thought Content: Thought content normal.         Judgment: Judgment normal.         Health Maintenance         Date Due Completion Date    COVID-19 Vaccine (1) Never done ---    Lipid Panel 01/05/2020 1/5/2015    Mammogram 03/10/2021 3/10/2020    Influenza Vaccine (1) Never done ---    Colorectal Cancer Screening Never done ---    Cervical Cancer Screening 01/25/2024 1/25/2019    Hemoglobin A1c (Diabetic Prevention Screening) 04/21/2025 4/21/2022    TETANUS VACCINE 04/18/2032 4/18/2022              Assessment and Plan:    Routine Adult Health Maintenance  Discussed healthy diet, regular exercise, necessary labs, age appropriate cancer screening, and routine vaccinations.      Essential hypertension  /84  -     Start irbesartan (AVAPRO) 150 MG tablet; Take 1 tablet (150 mg total) by mouth every evening.  Dispense: 30 tablet; Refill: 2        -     Continue metoprolol 100 mg PO   -     Hypertension Digital Medicine (HDMP) Enrollment Order  -     Hypertension Digital Medicine (HDMP): Assign Onboarding Questionnaires    Morbid obesity  Body mass index is 45.04 kg/m².  We discussed physical activity interventions for the management of weight and reduction of risk for chronic metabolic disease    Mixed incontinence urge and stress  Discussed referral to discuss urodynamic studies vs other interventions for condition  -     Ambulatory  referral/consult to Urogynecology; Future; Expected date: 01/31/2023    Polyarthritis  Serology negative for RA currently being managed on DMARD   - seeing Rheumatology    - Currently on hydroxychloroquine 200mg    Screening for lipid disorders  Counseled regarding routine lipid screening and lipid profile ordered  -     Lipid Panel; Future; Expected date: 01/24/2023    Anxiety  Psychophysiological insomnia   - Anxiety controlled with Lexapro 20mg PO   - Continue Lexapro, but switch taking lexapro from nighttime to daytime    Patellofemoral pain syndrome of both knees  -     Ambulatory referral/consult to Physical/Occupational Therapy; Future; Expected date: 01/31/2023    Long-term use of Plaquenil  Needs eye examination  -     Ambulatory referral/consult to Optometry; Future; Expected date: 01/31/2023    Colon cancer screening  -     Ambulatory referral/consult to Endo Procedure ; Future; Expected date: 01/25/2023    Sheri Boateng MS4    I hereby acknowledge that I am relying upon documentation authored by a medical student working under my supervision and further I hereby attest that I have verified the student documentation or findings by personally performing the physical exam and medical decision making activities of the Evaluation and Management service to be billed.    Geovanni Britton MD

## 2023-01-24 NOTE — PATIENT INSTRUCTIONS
Please call  [phone] to schedule an appointment with Physical/Occupational Therapy ([facility] [place] - [address] [place] [address])

## 2023-01-28 ENCOUNTER — PATIENT MESSAGE (OUTPATIENT)
Dept: RHEUMATOLOGY | Facility: CLINIC | Age: 48
End: 2023-01-28
Payer: MEDICAID

## 2023-01-30 PROBLEM — F41.9 ANXIETY: Status: ACTIVE | Noted: 2023-01-30

## 2023-01-31 ENCOUNTER — TELEPHONE (OUTPATIENT)
Dept: FAMILY MEDICINE | Facility: CLINIC | Age: 48
End: 2023-01-31
Payer: MEDICAID

## 2023-01-31 NOTE — TELEPHONE ENCOUNTER
Spoke with patient and informed her to get her recent office visit from her patient portal. Patient verbalized understanding.

## 2023-01-31 NOTE — TELEPHONE ENCOUNTER
----- Message from Nery Maharaj LPN sent at 1/31/2023 10:23 AM CST -----  Regarding: FW: Patient Call Back  Can you please take care of this thank you.  ----- Message -----  From: Anyi Alfaro  Sent: 1/30/2023   2:38 PM CST  To: Tobi Galarza Staff  Subject: Patient Call Back                                .Type: Patient Call Back    Who called: self     What is the request in detail: states she needs documentation of her diagnosis with anxiety along with date diagnosed.  Needs physical copy can we call when completed     Can the clinic reply by [facility]?    Would the patient rather a call back or a response via [facility]? Call     Best call back number: [phone]

## 2023-02-09 NOTE — PROGRESS NOTES
[facility]   Physical Therapy Initial Evaluation     Date: 2/10/2023   Name: Asha Paige  Clinic Number: [id number]    Therapy Diagnosis:   Encounter Diagnoses   Name Primary?    Patellofemoral pain syndrome of both knees     Muscle weakness of lower extremity      Physician: Geovanni Britton MD    Physician Orders: PT Eval and Treat   Medical Diagnosis from Referral: M22.2X1,M22.2X2 (ICD-10-CM) - Patellofemoral pain syndrome of both knees  Evaluation Date: 2/10/2023  Authorization Period Expiration: 1/24/2024  Plan of Care Expiration: 3/24/2023  Progress Note Due: 3/10/2023  Visit # / Visits authorized: 1/ 1       Precautions: Standard     Time In: 930a  Time Out: 1015a  Total Appointment Time (timed & untimed codes): 45 minutes      SUBJECTIVE     Pt presents to PT w/ reports of devin, R>L, knee pain. States she has knee pain for years, can not tell when symptoms first started. Was diagnoses over the past year with having rheumatoid arthritis. States that the R knee bothers her more with pain being located along the anterior inferior knee as well as posterior knee(primarily when lying supine). States she constantly feels an aching pain within her knees so is not sure of aggravating factors.  She will have more difficulty with household chores that involve crouching/getting on the floor, as well as going up/down stairs and rising from a chair.  If she is having to do a lot of work throughout the day then her symptoms will increase.  Has not had much success finding easing factors besides recent arthritis medication prescribed.  Pt has an elliptical to which she will ride for about 60min every other day. Does not have any symptoms while on elliptical.     Imaging, none:       Pain:  Current 5/10, worst 10/10, best 5/10     Patients goals: To feel better     Medical History:   Past Medical History:   Diagnosis Date    Allergy     Angio-edema     Eczema     History of pulmonary embolism  2005    Hypertension     Urticaria        Surgical History:   Asha Paige  has a past surgical history that includes Plate in right arm ; Vaginal delivery; and Fracture surgery.    Medications:   Asha has a current medication list which includes the following prescription(s): cetirizine, doxycycline, ergocalciferol, escitalopram oxalate, hydrocortisone, hydroxychloroquine, irbesartan, meloxicam, metoprolol succinate, and triamcinolone acetonide 0.1%.    Allergies:   Review of patient's allergies indicates:   Allergen Reactions    Motrin ib [ibuprofen] Swelling     Swollen lips and knot on her head     Lisinopril Other (See Comments)     Angioedema          OBJECTIVE     Observation: gait, R>L trendelenberg  Squat: quad strategy, unable to reach parallel, devin ant knee pain      Range of Motion (Active):   Knee Right  Left    Flexion 110 130   Extension -3 0   Pain with R knee flexion along ant R knee    Lower Extremity Strength  Right LE  Left LE    Quadriceps: 5/5 Quadriceps: 5/5   Hamstrings: 4+/5 Hamstrings: 4/5   Hip flexion (seated): 3+/5 Hip flexion (seated): 3+/5   Hip extension:  4/5 Hip extension: 4/5   PGM: 4/5 PGM:  4+/5   30sec STS: 9 reps, R>L knee pain  SLS: devin 30sec- inc hip strategy with L LE, wt shift over support leg    Special Tests:   Kj test: -devin  Joint line tenderness: R lat joint line, L med joint line    Joint Mobility: Hypombile L PFJ inf/med glide, R TFJ AP glide     Palpation: TTP devin knees, R>L, along the inferior knee.  R TTP quad tendon.  Inc muscle tone along post knee and devin calf      Limitation/Restriction for FOTO knees Survey    Therapist reviewed FOTO scores for Asha Paige on 2/10/2023.   FOTO documents entered into [employer] - see Media section.    Limitation Score: 65%         TREATMENT     Total Treatment time (time-based codes) separate from Evaluation: 30 minutes      Asha received the treatments listed below:      therapeutic exercises to develop strength and ROM  for 20 minutes including:  SLR  Bridge (painful)  Clamshell  HS str    manual therapy techniques: Joint mobilizations were applied to the: Braden Knee for 10 minutes, including:  Braden PFJ inf glide gr3  R TFJ AP mob gr3  R TFJ IR mob w/ MET gr3      PATIENT EDUCATION AND HOME EXERCISES     Education provided:   - Pt educated in dx, prognosis, POC, and HEP to include activity modification to aide in symptom modulation    Written Home Exercises Provided: yes. Exercises were reviewed and Asha was able to demonstrate them prior to the end of the session.  Asha demonstrated good  understanding of the education provided. See EMR under Patient Instructions for exercises provided during therapy sessions.    ASSESSMENT     Ms. Paige is a 46 y/o F presenting to PT w/ reports of Braden, R>L, knee pain.  Objective examination revealed decreased knee ROM and strength, decreased joint mobility, antalgic gait, aberrant squat mechanics, and tenderness to palpation consistent with braden knee pain with movement coordination impairments.  Pt currently limited in ability to complete household chores, activities with her children, and chair mobility without pain.  She would benefit form skilled PT to address above limitations to return to PLOF.     Patient prognosis is Good.   Patient will benefit from skilled outpatient Physical Therapy to address the deficits stated above and in the chart below, provide patient /family education, and to maximize patientt's level of independence.     Plan of care discussed with patient: Yes  Patient's spiritual, cultural and educational needs considered and patient is agreeable to the plan of care and goals as stated below:     Anticipated Barriers for therapy: none    Medical Necessity is demonstrated by the following  History  Co-morbidities and personal factors that may impact the plan of care Co-morbidities:   HTN    Personal Factors:   no deficits     low   Examination  Body Structures and Functions,  activity limitations and participation restrictions that may impact the plan of care Body Regions:   lower extremities    Body Systems:    strength  gross coordinated movement  motor control    Participation Restrictions:   none    Activity limitations:   Learning and applying knowledge  no deficits    General Tasks and Commands  no deficits    Communication  no deficits    Mobility  walking    Self care  no deficits    Domestic Life  doing house work (cleaning house, washing dishes, laundry)    Interactions/Relationships  no deficits    Life Areas  no deficits    Community and Social Life  no deficits         low   Clinical Presentation stable and uncomplicated low   Decision Making/ Complexity Score: low     Goals:  Short Term Goals:  Pt will be ind w/ HEP w/in 2 wks  Pt will report a dec of at least 2pts on 0-10 scale (per MCID) in knee pain within 3 wks for symptom modulation  3.   Pt will demonstrate improved R knee AROM flexion to at least 130deg pain free for improved mobility within 3 wks.     Long Term Goals:  Pt will demonstrate an inc of at least 1 MMT grade in hip strength within 6 wks for improved weight bearing support  Pt will demonstrate ability to step up onto an 8in step 20times w/o onset of knee pain for improved symptoms with stairs within 6 wks.   Pt will demonstrate inc in LE strength evident by at least 12 repetitions on 30sec STS test within 6 wks.        PLAN   Plan of care Certification: 2/10/2023 to 3/24/2023.    Outpatient Physical Therapy 2 times weekly for 6 weeks to include the following interventions: Manual Therapy, Neuromuscular Re-ed, Patient Education, Therapeutic Activities, and Therapeutic Exercise.     Jerod Mccormick, PT, DPT, OCS      I CERTIFY THE NEED FOR THESE SERVICES FURNISHED UNDER THIS PLAN OF TREATMENT AND WHILE UNDER MY CARE   Physician's comments:     Physician's Signature: ___________________________________________________

## 2023-02-10 ENCOUNTER — CLINICAL SUPPORT (OUTPATIENT)
Dept: REHABILITATION | Facility: HOSPITAL | Age: 48
End: 2023-02-10
Attending: INTERNAL MEDICINE
Payer: MEDICAID

## 2023-02-10 DIAGNOSIS — M22.2X1 PATELLOFEMORAL PAIN SYNDROME OF BOTH KNEES: ICD-10-CM

## 2023-02-10 DIAGNOSIS — M62.81 MUSCLE WEAKNESS OF LOWER EXTREMITY: ICD-10-CM

## 2023-02-10 DIAGNOSIS — M22.2X2 PATELLOFEMORAL PAIN SYNDROME OF BOTH KNEES: ICD-10-CM

## 2023-02-10 PROCEDURE — 97110 THERAPEUTIC EXERCISES: CPT | Mod: PN

## 2023-02-10 PROCEDURE — 97161 PT EVAL LOW COMPLEX 20 MIN: CPT | Mod: PN

## 2023-02-10 NOTE — PLAN OF CARE
[facility]   Physical Therapy Initial Evaluation     Date: 2/10/2023   Name: Asha Paige  Clinic Number: [id number]    Therapy Diagnosis:   Encounter Diagnoses   Name Primary?    Patellofemoral pain syndrome of both knees     Muscle weakness of lower extremity      Physician: Geovanni Britton MD    Physician Orders: PT Eval and Treat   Medical Diagnosis from Referral: M22.2X1,M22.2X2 (ICD-10-CM) - Patellofemoral pain syndrome of both knees  Evaluation Date: 2/10/2023  Authorization Period Expiration: 1/24/2024  Plan of Care Expiration: 3/24/2023  Progress Note Due: 3/10/2023  Visit # / Visits authorized: 1/ 1       Precautions: Standard     Time In: 930a  Time Out: 1015a  Total Appointment Time (timed & untimed codes): 45 minutes      SUBJECTIVE     Pt presents to PT w/ reports of devin, R>L, knee pain. States she has knee pain for years, can not tell when symptoms first started. Was diagnoses over the past year with having rheumatoid arthritis. States that the R knee bothers her more with pain being located along the anterior inferior knee as well as posterior knee(primarily when lying supine). States she constantly feels an aching pain within her knees so is not sure of aggravating factors.  She will have more difficulty with household chores that involve crouching/getting on the floor, as well as going up/down stairs and rising from a chair.  If she is having to do a lot of work throughout the day then her symptoms will increase.  Has not had much success finding easing factors besides recent arthritis medication prescribed.  Pt has an elliptical to which she will ride for about 60min every other day. Does not have any symptoms while on elliptical.     Imaging, none:       Pain:  Current 5/10, worst 10/10, best 5/10     Patients goals: To feel better     Medical History:   Past Medical History:   Diagnosis Date    Allergy     Angio-edema     Eczema     History of pulmonary embolism  2005    Hypertension     Urticaria        Surgical History:   Asha Paige  has a past surgical history that includes Plate in right arm ; Vaginal delivery; and Fracture surgery.    Medications:   Asha has a current medication list which includes the following prescription(s): cetirizine, doxycycline, ergocalciferol, escitalopram oxalate, hydrocortisone, hydroxychloroquine, irbesartan, meloxicam, metoprolol succinate, and triamcinolone acetonide 0.1%.    Allergies:   Review of patient's allergies indicates:   Allergen Reactions    Motrin ib [ibuprofen] Swelling     Swollen lips and knot on her head     Lisinopril Other (See Comments)     Angioedema          OBJECTIVE     Observation: gait, R>L trendelenberg  Squat: quad strategy, unable to reach parallel, devin ant knee pain      Range of Motion (Active):   Knee Right  Left    Flexion 110 130   Extension -3 0   Pain with R knee flexion along ant R knee    Lower Extremity Strength  Right LE  Left LE    Quadriceps: 5/5 Quadriceps: 5/5   Hamstrings: 4+/5 Hamstrings: 4/5   Hip flexion (seated): 3+/5 Hip flexion (seated): 3+/5   Hip extension:  4/5 Hip extension: 4/5   PGM: 4/5 PGM:  4+/5   30sec STS: 9 reps, R>L knee pain  SLS: devin 30sec- inc hip strategy with L LE, wt shift over support leg    Special Tests:   Kj test: -devin  Joint line tenderness: R lat joint line, L med joint line    Joint Mobility: Hypombile L PFJ inf/med glide, R TFJ AP glide     Palpation: TTP devin knees, R>L, along the inferior knee.  R TTP quad tendon.  Inc muscle tone along post knee and devin calf      Limitation/Restriction for FOTO knees Survey    Therapist reviewed FOTO scores for Asha Paige on 2/10/2023.   FOTO documents entered into [employer] - see Media section.    Limitation Score: 65%         TREATMENT     Total Treatment time (time-based codes) separate from Evaluation: 30 minutes      Asha received the treatments listed below:      therapeutic exercises to develop strength and ROM  for 20 minutes including:  SLR  Bridge (painful)  Clamshell  HS str    manual therapy techniques: Joint mobilizations were applied to the: Braden Knee for 10 minutes, including:  Braden PFJ inf glide gr3  R TFJ AP mob gr3  R TFJ IR mob w/ MET gr3      PATIENT EDUCATION AND HOME EXERCISES     Education provided:   - Pt educated in dx, prognosis, POC, and HEP to include activity modification to aide in symptom modulation    Written Home Exercises Provided: yes. Exercises were reviewed and Asha was able to demonstrate them prior to the end of the session.  Asha demonstrated good  understanding of the education provided. See EMR under Patient Instructions for exercises provided during therapy sessions.    ASSESSMENT     Ms. Paige is a 48 y/o F presenting to PT w/ reports of Braden, R>L, knee pain.  Objective examination revealed decreased knee ROM and strength, decreased joint mobility, antalgic gait, aberrant squat mechanics, and tenderness to palpation consistent with braden knee pain with movement coordination impairments.  Pt currently limited in ability to complete household chores, activities with her children, and chair mobility without pain.  She would benefit form skilled PT to address above limitations to return to PLOF.     Patient prognosis is Good.   Patient will benefit from skilled outpatient Physical Therapy to address the deficits stated above and in the chart below, provide patient /family education, and to maximize patientt's level of independence.     Plan of care discussed with patient: Yes  Patient's spiritual, cultural and educational needs considered and patient is agreeable to the plan of care and goals as stated below:     Anticipated Barriers for therapy: none    Medical Necessity is demonstrated by the following  History  Co-morbidities and personal factors that may impact the plan of care Co-morbidities:   HTN    Personal Factors:   no deficits     low   Examination  Body Structures and Functions,  activity limitations and participation restrictions that may impact the plan of care Body Regions:   lower extremities    Body Systems:    strength  gross coordinated movement  motor control    Participation Restrictions:   none    Activity limitations:   Learning and applying knowledge  no deficits    General Tasks and Commands  no deficits    Communication  no deficits    Mobility  walking    Self care  no deficits    Domestic Life  doing house work (cleaning house, washing dishes, laundry)    Interactions/Relationships  no deficits    Life Areas  no deficits    Community and Social Life  no deficits         low   Clinical Presentation stable and uncomplicated low   Decision Making/ Complexity Score: low     Goals:  Short Term Goals:  Pt will be ind w/ HEP w/in 2 wks  Pt will report a dec of at least 2pts on 0-10 scale (per MCID) in knee pain within 3 wks for symptom modulation  3.   Pt will demonstrate improved R knee AROM flexion to at least 130deg pain free for improved mobility within 3 wks.     Long Term Goals:  Pt will demonstrate an inc of at least 1 MMT grade in hip strength within 6 wks for improved weight bearing support  Pt will demonstrate ability to step up onto an 8in step 20times w/o onset of knee pain for improved symptoms with stairs within 6 wks.   Pt will demonstrate inc in LE strength evident by at least 12 repetitions on 30sec STS test within 6 wks.        PLAN   Plan of care Certification: 2/10/2023 to 3/24/2023.    Outpatient Physical Therapy 2 times weekly for 6 weeks to include the following interventions: Manual Therapy, Neuromuscular Re-ed, Patient Education, Therapeutic Activities, and Therapeutic Exercise.     Jerod Mccormick, PT, DPT, OCS      I CERTIFY THE NEED FOR THESE SERVICES FURNISHED UNDER THIS PLAN OF TREATMENT AND WHILE UNDER MY CARE   Physician's comments:     Physician's Signature: ___________________________________________________

## 2023-03-01 ENCOUNTER — DOCUMENTATION ONLY (OUTPATIENT)
Dept: REHABILITATION | Facility: HOSPITAL | Age: 48
End: 2023-03-01
Payer: MEDICAID

## 2023-03-01 PROBLEM — M62.81 MUSCLE WEAKNESS OF LOWER EXTREMITY: Status: RESOLVED | Noted: 2023-02-10 | Resolved: 2023-03-01

## 2023-03-01 NOTE — PROGRESS NOTES
Physical Therapy Discharge     Pt was seen in PT for IE only on 2/10. She cancelled or no showed each of her scheduled follow up visits.  Pt is to be DC due to non compliance.

## 2023-03-07 ENCOUNTER — PATIENT OUTREACH (OUTPATIENT)
Dept: ADMINISTRATIVE | Facility: HOSPITAL | Age: 48
End: 2023-03-07
Payer: MEDICAID

## 2023-04-27 ENCOUNTER — PATIENT MESSAGE (OUTPATIENT)
Dept: ENDOSCOPY | Facility: HOSPITAL | Age: 48
End: 2023-04-27
Payer: MEDICAID

## 2023-04-28 ENCOUNTER — OFFICE VISIT (OUTPATIENT)
Dept: OBSTETRICS AND GYNECOLOGY | Facility: CLINIC | Age: 48
End: 2023-04-28
Payer: MEDICAID

## 2023-04-28 VITALS
SYSTOLIC BLOOD PRESSURE: 130 MMHG | WEIGHT: 232.13 LBS | BODY MASS INDEX: 45.34 KG/M2 | DIASTOLIC BLOOD PRESSURE: 80 MMHG

## 2023-04-28 DIAGNOSIS — L73.2 HIDRADENITIS SUPPURATIVA: Primary | ICD-10-CM

## 2023-04-28 DIAGNOSIS — Z01.419 GYNECOLOGIC EXAM NORMAL: ICD-10-CM

## 2023-04-28 PROCEDURE — 3008F PR BODY MASS INDEX (BMI) DOCUMENTED: ICD-10-PCS | Mod: CPTII,,, | Performed by: OBSTETRICS & GYNECOLOGY

## 2023-04-28 PROCEDURE — 99396 PR PREVENTIVE VISIT,EST,40-64: ICD-10-PCS | Mod: S$PBB,,, | Performed by: OBSTETRICS & GYNECOLOGY

## 2023-04-28 PROCEDURE — 4010F PR ACE/ARB THEARPY RXD/TAKEN: ICD-10-PCS | Mod: CPTII,,, | Performed by: OBSTETRICS & GYNECOLOGY

## 2023-04-28 PROCEDURE — 3075F SYST BP GE 130 - 139MM HG: CPT | Mod: CPTII,,, | Performed by: OBSTETRICS & GYNECOLOGY

## 2023-04-28 PROCEDURE — 4010F ACE/ARB THERAPY RXD/TAKEN: CPT | Mod: CPTII,,, | Performed by: OBSTETRICS & GYNECOLOGY

## 2023-04-28 PROCEDURE — 99999 PR PBB SHADOW E&M-EST. PATIENT-LVL III: CPT | Mod: PBBFAC,,, | Performed by: OBSTETRICS & GYNECOLOGY

## 2023-04-28 PROCEDURE — 3079F DIAST BP 80-89 MM HG: CPT | Mod: CPTII,,, | Performed by: OBSTETRICS & GYNECOLOGY

## 2023-04-28 PROCEDURE — 87624 HPV HI-RISK TYP POOLED RSLT: CPT | Performed by: OBSTETRICS & GYNECOLOGY

## 2023-04-28 PROCEDURE — 1159F MED LIST DOCD IN RCRD: CPT | Mod: CPTII,,, | Performed by: OBSTETRICS & GYNECOLOGY

## 2023-04-28 PROCEDURE — 1159F PR MEDICATION LIST DOCUMENTED IN MEDICAL RECORD: ICD-10-PCS | Mod: CPTII,,, | Performed by: OBSTETRICS & GYNECOLOGY

## 2023-04-28 PROCEDURE — 99213 OFFICE O/P EST LOW 20 MIN: CPT | Mod: PBBFAC | Performed by: OBSTETRICS & GYNECOLOGY

## 2023-04-28 PROCEDURE — 3008F BODY MASS INDEX DOCD: CPT | Mod: CPTII,,, | Performed by: OBSTETRICS & GYNECOLOGY

## 2023-04-28 PROCEDURE — 3075F PR MOST RECENT SYSTOLIC BLOOD PRESS GE 130-139MM HG: ICD-10-PCS | Mod: CPTII,,, | Performed by: OBSTETRICS & GYNECOLOGY

## 2023-04-28 PROCEDURE — 88175 CYTOPATH C/V AUTO FLUID REDO: CPT | Performed by: OBSTETRICS & GYNECOLOGY

## 2023-04-28 PROCEDURE — 3079F PR MOST RECENT DIASTOLIC BLOOD PRESSURE 80-89 MM HG: ICD-10-PCS | Mod: CPTII,,, | Performed by: OBSTETRICS & GYNECOLOGY

## 2023-04-28 PROCEDURE — 99999 PR PBB SHADOW E&M-EST. PATIENT-LVL III: ICD-10-PCS | Mod: PBBFAC,,, | Performed by: OBSTETRICS & GYNECOLOGY

## 2023-04-28 PROCEDURE — 99396 PREV VISIT EST AGE 40-64: CPT | Mod: S$PBB,,, | Performed by: OBSTETRICS & GYNECOLOGY

## 2023-05-03 ENCOUNTER — OFFICE VISIT (OUTPATIENT)
Dept: RHEUMATOLOGY | Facility: CLINIC | Age: 48
End: 2023-05-03
Payer: MEDICAID

## 2023-05-03 ENCOUNTER — LAB VISIT (OUTPATIENT)
Dept: LAB | Facility: HOSPITAL | Age: 48
End: 2023-05-03
Attending: INTERNAL MEDICINE
Payer: MEDICAID

## 2023-05-03 VITALS
WEIGHT: 230.63 LBS | OXYGEN SATURATION: 98 % | BODY MASS INDEX: 45.28 KG/M2 | HEIGHT: 60 IN | HEART RATE: 81 BPM | DIASTOLIC BLOOD PRESSURE: 100 MMHG | SYSTOLIC BLOOD PRESSURE: 165 MMHG

## 2023-05-03 DIAGNOSIS — E66.01 MORBID OBESITY: ICD-10-CM

## 2023-05-03 DIAGNOSIS — M05.79 RHEUMATOID ARTHRITIS INVOLVING MULTIPLE SITES WITH POSITIVE RHEUMATOID FACTOR: ICD-10-CM

## 2023-05-03 DIAGNOSIS — M05.79 RHEUMATOID ARTHRITIS INVOLVING MULTIPLE SITES WITH POSITIVE RHEUMATOID FACTOR: Primary | ICD-10-CM

## 2023-05-03 DIAGNOSIS — M15.9 PRIMARY OSTEOARTHRITIS INVOLVING MULTIPLE JOINTS: ICD-10-CM

## 2023-05-03 DIAGNOSIS — Z79.899 IMMUNODEFICIENCY DUE TO TREATMENT WITH IMMUNOSUPPRESSIVE MEDICATION: ICD-10-CM

## 2023-05-03 DIAGNOSIS — Z71.89 COUNSELING AND COORDINATION OF CARE: ICD-10-CM

## 2023-05-03 DIAGNOSIS — D84.821 IMMUNODEFICIENCY DUE TO TREATMENT WITH IMMUNOSUPPRESSIVE MEDICATION: ICD-10-CM

## 2023-05-03 LAB
25(OH)D3+25(OH)D2 SERPL-MCNC: 12 NG/ML (ref 30–96)
ALBUMIN SERPL BCP-MCNC: 3.4 G/DL (ref 3.5–5.2)
ALP SERPL-CCNC: 76 U/L (ref 55–135)
ALT SERPL W/O P-5'-P-CCNC: 12 U/L (ref 10–44)
ANION GAP SERPL CALC-SCNC: 8 MMOL/L (ref 8–16)
AST SERPL-CCNC: 17 U/L (ref 10–40)
BASOPHILS # BLD AUTO: 0.07 K/UL (ref 0–0.2)
BASOPHILS NFR BLD: 0.7 % (ref 0–1.9)
BILIRUB SERPL-MCNC: 0.6 MG/DL (ref 0.1–1)
BUN SERPL-MCNC: 7 MG/DL (ref 6–20)
CALCIUM SERPL-MCNC: 9 MG/DL (ref 8.7–10.5)
CHLORIDE SERPL-SCNC: 103 MMOL/L (ref 95–110)
CO2 SERPL-SCNC: 28 MMOL/L (ref 23–29)
CREAT SERPL-MCNC: 0.7 MG/DL (ref 0.5–1.4)
CRP SERPL-MCNC: 28.2 MG/L (ref 0–8.2)
DIFFERENTIAL METHOD: ABNORMAL
EOSINOPHIL # BLD AUTO: 0.1 K/UL (ref 0–0.5)
EOSINOPHIL NFR BLD: 0.8 % (ref 0–8)
ERYTHROCYTE [DISTWIDTH] IN BLOOD BY AUTOMATED COUNT: 15.4 % (ref 11.5–14.5)
ERYTHROCYTE [SEDIMENTATION RATE] IN BLOOD BY PHOTOMETRIC METHOD: 42 MM/HR (ref 0–36)
EST. GFR  (NO RACE VARIABLE): >60 ML/MIN/1.73 M^2
GLUCOSE SERPL-MCNC: 79 MG/DL (ref 70–110)
HCT VFR BLD AUTO: 44.8 % (ref 37–48.5)
HGB BLD-MCNC: 13.9 G/DL (ref 12–16)
IMM GRANULOCYTES # BLD AUTO: 0.03 K/UL (ref 0–0.04)
IMM GRANULOCYTES NFR BLD AUTO: 0.3 % (ref 0–0.5)
LYMPHOCYTES # BLD AUTO: 2.1 K/UL (ref 1–4.8)
LYMPHOCYTES NFR BLD: 21.8 % (ref 18–48)
MCH RBC QN AUTO: 25.9 PG (ref 27–31)
MCHC RBC AUTO-ENTMCNC: 31 G/DL (ref 32–36)
MCV RBC AUTO: 84 FL (ref 82–98)
MONOCYTES # BLD AUTO: 0.6 K/UL (ref 0.3–1)
MONOCYTES NFR BLD: 6.3 % (ref 4–15)
NEUTROPHILS # BLD AUTO: 6.6 K/UL (ref 1.8–7.7)
NEUTROPHILS NFR BLD: 70.1 % (ref 38–73)
NRBC BLD-RTO: 0 /100 WBC
PLATELET # BLD AUTO: 315 K/UL (ref 150–450)
PMV BLD AUTO: 10.5 FL (ref 9.2–12.9)
POTASSIUM SERPL-SCNC: 4 MMOL/L (ref 3.5–5.1)
PROT SERPL-MCNC: 8.1 G/DL (ref 6–8.4)
RBC # BLD AUTO: 5.36 M/UL (ref 4–5.4)
SODIUM SERPL-SCNC: 139 MMOL/L (ref 136–145)
WBC # BLD AUTO: 9.49 K/UL (ref 3.9–12.7)

## 2023-05-03 PROCEDURE — 3080F PR MOST RECENT DIASTOLIC BLOOD PRESSURE >= 90 MM HG: ICD-10-PCS | Mod: CPTII,,, | Performed by: INTERNAL MEDICINE

## 2023-05-03 PROCEDURE — 4010F PR ACE/ARB THEARPY RXD/TAKEN: ICD-10-PCS | Mod: CPTII,,, | Performed by: INTERNAL MEDICINE

## 2023-05-03 PROCEDURE — 3008F BODY MASS INDEX DOCD: CPT | Mod: CPTII,,, | Performed by: INTERNAL MEDICINE

## 2023-05-03 PROCEDURE — 3077F PR MOST RECENT SYSTOLIC BLOOD PRESSURE >= 140 MM HG: ICD-10-PCS | Mod: CPTII,,, | Performed by: INTERNAL MEDICINE

## 2023-05-03 PROCEDURE — 20610 LARGE JOINT ASPIRATION/INJECTION: BILATERAL KNEE: ICD-10-PCS | Mod: 50,S$PBB,, | Performed by: INTERNAL MEDICINE

## 2023-05-03 PROCEDURE — 1159F PR MEDICATION LIST DOCUMENTED IN MEDICAL RECORD: ICD-10-PCS | Mod: CPTII,,, | Performed by: INTERNAL MEDICINE

## 2023-05-03 PROCEDURE — 36415 COLL VENOUS BLD VENIPUNCTURE: CPT | Mod: PO | Performed by: INTERNAL MEDICINE

## 2023-05-03 PROCEDURE — 85025 COMPLETE CBC W/AUTO DIFF WBC: CPT | Performed by: INTERNAL MEDICINE

## 2023-05-03 PROCEDURE — 99999 PR PBB SHADOW E&M-EST. PATIENT-LVL III: ICD-10-PCS | Mod: PBBFAC,,, | Performed by: INTERNAL MEDICINE

## 2023-05-03 PROCEDURE — 99215 OFFICE O/P EST HI 40 MIN: CPT | Mod: S$PBB,25,, | Performed by: INTERNAL MEDICINE

## 2023-05-03 PROCEDURE — 1159F MED LIST DOCD IN RCRD: CPT | Mod: CPTII,,, | Performed by: INTERNAL MEDICINE

## 2023-05-03 PROCEDURE — 3077F SYST BP >= 140 MM HG: CPT | Mod: CPTII,,, | Performed by: INTERNAL MEDICINE

## 2023-05-03 PROCEDURE — 99215 PR OFFICE/OUTPT VISIT, EST, LEVL V, 40-54 MIN: ICD-10-PCS | Mod: S$PBB,25,, | Performed by: INTERNAL MEDICINE

## 2023-05-03 PROCEDURE — 86140 C-REACTIVE PROTEIN: CPT | Performed by: INTERNAL MEDICINE

## 2023-05-03 PROCEDURE — 82306 VITAMIN D 25 HYDROXY: CPT | Performed by: INTERNAL MEDICINE

## 2023-05-03 PROCEDURE — 3080F DIAST BP >= 90 MM HG: CPT | Mod: CPTII,,, | Performed by: INTERNAL MEDICINE

## 2023-05-03 PROCEDURE — 80053 COMPREHEN METABOLIC PANEL: CPT | Performed by: INTERNAL MEDICINE

## 2023-05-03 PROCEDURE — 3008F PR BODY MASS INDEX (BMI) DOCUMENTED: ICD-10-PCS | Mod: CPTII,,, | Performed by: INTERNAL MEDICINE

## 2023-05-03 PROCEDURE — 99999 PR PBB SHADOW E&M-EST. PATIENT-LVL III: CPT | Mod: PBBFAC,,, | Performed by: INTERNAL MEDICINE

## 2023-05-03 PROCEDURE — 85652 RBC SED RATE AUTOMATED: CPT | Performed by: INTERNAL MEDICINE

## 2023-05-03 PROCEDURE — 4010F ACE/ARB THERAPY RXD/TAKEN: CPT | Mod: CPTII,,, | Performed by: INTERNAL MEDICINE

## 2023-05-03 PROCEDURE — 99213 OFFICE O/P EST LOW 20 MIN: CPT | Mod: PBBFAC,PO,25 | Performed by: INTERNAL MEDICINE

## 2023-05-03 PROCEDURE — 20610 DRAIN/INJ JOINT/BURSA W/O US: CPT | Mod: 50,PBBFAC,PO | Performed by: INTERNAL MEDICINE

## 2023-05-03 RX ORDER — LIDOCAINE HYDROCHLORIDE 10 MG/ML
2 INJECTION INFILTRATION; PERINEURAL
Status: DISCONTINUED | OUTPATIENT
Start: 2023-05-03 | End: 2023-05-03 | Stop reason: HOSPADM

## 2023-05-03 RX ORDER — TRIAMCINOLONE ACETONIDE 40 MG/ML
40 INJECTION, SUSPENSION INTRA-ARTICULAR; INTRAMUSCULAR
Status: DISCONTINUED | OUTPATIENT
Start: 2023-05-03 | End: 2023-05-03 | Stop reason: HOSPADM

## 2023-05-03 RX ORDER — NAPROXEN 500 MG/1
500 TABLET ORAL 2 TIMES DAILY WITH MEALS
Qty: 60 TABLET | Refills: 6 | Status: SHIPPED | OUTPATIENT
Start: 2023-05-03 | End: 2024-03-12

## 2023-05-03 RX ADMIN — LIDOCAINE HYDROCHLORIDE 2 ML: 10 INJECTION, SOLUTION INFILTRATION; PERINEURAL at 11:05

## 2023-05-03 RX ADMIN — TRIAMCINOLONE ACETONIDE 40 MG: 40 INJECTION, SUSPENSION INTRA-ARTICULAR; INTRAMUSCULAR at 11:05

## 2023-05-03 NOTE — PROCEDURES
Large Joint Aspiration/Injection: bilateral knee    Date/Time: 5/3/2023 11:30 AM  Performed by: Harsh Damian MD  Authorized by: Harsh Damian MD     Consent Done?:  Yes (Verbal)  Indications:  Arthritis and pain  Site marked: the procedure site was marked    Timeout: prior to procedure the correct patient, procedure, and site was verified    Prep: patient was prepped and draped in usual sterile fashion    Local anesthesia used?: No      Details:  Needle Size:  25 G  Ultrasonic Guidance for needle placement?: No    Approach:  Anteromedial  Location:  Knee  Laterality:  Bilateral  Site:  Bilateral knee  Medications (Right):  2 mL LIDOcaine HCL 10 mg/ml (1%) 10 mg/mL (1 %); 40 mg triamcinolone acetonide 40 mg/mL  Medications (Left):  2 mL LIDOcaine HCL 10 mg/ml (1%) 10 mg/mL (1 %); 40 mg triamcinolone acetonide 40 mg/mL  Patient tolerance:  Patient tolerated the procedure well with no immediate complications

## 2023-05-03 NOTE — PROGRESS NOTES
RHEUMATOLOGY OUTPATIENT CLINIC NOTE    5/3/2023    Attending Rheumatologist: Harsh Damian  Primary Care Provider: Geovanni Britton MD   Physician Requesting Consultation: No referring provider defined for this encounter.  Chief Complaint/Reason For Consultation:  Rheumatoid Arthritis and Pain      Subjective:       HPI  Asha Paige is a 47 y.o. Black or  female with medical history noted below who presents for evaluation of joint pain.   Patient presents for evaluation of joint pain. She notes the onset of joint pain over 3 years ago. It started in the shoulders and has progressed to affect most of her joints. Notes trying CSI to shoulder with relief when it first started. She c/o swelling in the ankles and hands. Morning stiffness lasting all day. She notes as she moves pain lighten up a bit, when she is still or laying down she stiffens up. Has tried OTC NSAIDs and Tylenol with no relief. On Meloxicam and Cymbalta with little relief. +Fatigue. No wt loss, chills or fevers, rash. Dad with RA.     Today  Patient here for follow up.   Last visit presented for evaluation of joint pain, diagnosed with RA and started on HCQ. She notes the medications helps her joints. Interesting has developed facial rash since starting med. She also endorses knee pain, which medication or NSAIDs not helping. Tolerating med.     Review of Systems   Constitutional:  Positive for fatigue. Negative for chills, fever and unexpected weight change.   HENT:  Negative for mouth sores.    Eyes:  Negative for redness and eye dryness.   Respiratory:  Negative for cough and shortness of breath.    Cardiovascular:  Negative for chest pain.   Gastrointestinal:  Negative for abdominal distention, constipation, diarrhea, nausea and vomiting.   Genitourinary:  Negative for vaginal dryness.   Musculoskeletal:  Positive for arthralgias, joint swelling, leg pain and myalgias. Negative for back pain, gait problem, neck pain,  neck stiffness and joint deformity.   Integumentary:  Negative for rash.   Neurological:  Negative for weakness, numbness and headaches.   Hematological:  Negative for adenopathy. Does not bruise/bleed easily.   Psychiatric/Behavioral:  Negative for confusion, decreased concentration and sleep disturbance. The patient is not nervous/anxious.    All other systems reviewed and are negative.     Chronic comorbid conditions affecting medical decision making today:  Past Medical History:   Diagnosis Date    Allergy     Angio-edema     Eczema     History of pulmonary embolism 2005    Hypertension     Urticaria      Past Surgical History:   Procedure Laterality Date    FRACTURE SURGERY      Plate in right arm       VAGINAL DELIVERY       Family History   Problem Relation Age of Onset    Cancer Maternal Grandmother     Allergic rhinitis Mother     Stroke Father     Breast cancer Neg Hx     Ovarian cancer Neg Hx      Social History     Substance and Sexual Activity   Alcohol Use Yes    Comment: Social      Social History     Tobacco Use   Smoking Status Never   Smokeless Tobacco Never     Social History     Substance and Sexual Activity   Drug Use No       Current Outpatient Medications:     cetirizine (ZYRTEC) 10 MG tablet, Take 1 tablet (10 mg total) by mouth once daily., Disp: 30 tablet, Rfl: 2    EScitalopram oxalate (LEXAPRO) 20 MG tablet, TAKE 1 TABLET(20 MG) BY MOUTH EVERY EVENING, Disp: 90 tablet, Rfl: 1    hydrOXYchloroQUINE (PLAQUENIL) 200 mg tablet, Take 1 tablet (200 mg total) by mouth 2 (two) times daily., Disp: 60 tablet, Rfl: 6    irbesartan (AVAPRO) 150 MG tablet, Take 1 tablet (150 mg total) by mouth every evening., Disp: 30 tablet, Rfl: 2    meloxicam (MOBIC) 15 MG tablet, Take 1 tablet (15 mg total) by mouth daily as needed for Pain., Disp: 30 tablet, Rfl: 0    metoprolol succinate (TOPROL-XL) 100 MG 24 hr tablet, TAKE 1 TABLET(100 MG) BY MOUTH EVERY DAY, Disp: 90 tablet, Rfl: 3    doxycycline (VIBRAMYCIN)  100 MG Cap, TK 1 C PO QD FOR ACNE, Disp: , Rfl:     ergocalciferol (ERGOCALCIFEROL) 50,000 unit Cap, Take 50,000 Units by mouth every 7 days., Disp: , Rfl:     hydrocortisone 2.5 % ointment, APPLY TOPICALLY TO FACE BID PRN UTD, Disp: , Rfl:     naproxen (NAPROSYN) 500 MG tablet, Take 1 tablet (500 mg total) by mouth 2 (two) times daily with meals., Disp: 60 tablet, Rfl: 6    triamcinolone acetonide 0.1% (KENALOG) 0.1 % cream, Apply topically 2 (two) times daily. Apply to affected areas of skin for 14 days (Patient not taking: Reported on 5/3/2023), Disp: 80 g, Rfl: 0     Objective:         Vitals:    05/03/23 1126   BP: (!) 165/100   Pulse: 81     Physical Exam  Can make fist, no synovitis  Shoulder ROM ok  Knee crepitus, no effusion  Negative ankle/MTP  No tender points     Reviewed old and all outside pertinent medical records available.    All lab results personally reviewed and interpreted by me.  Lab Results   Component Value Date    WBC 9.37 09/14/2022    HGB 14.0 09/14/2022    HCT 41.7 09/14/2022    MCV 82 09/14/2022    MCH 27.6 09/14/2022    MCHC 33.6 09/14/2022    RDW 15.5 (H) 09/14/2022     09/14/2022    MPV 9.6 09/14/2022    PLTEST Increased (A) 07/26/2006       Lab Results   Component Value Date     09/14/2022    K 4.0 09/14/2022     09/14/2022    CO2 28 09/14/2022    GLU 78 09/14/2022    BUN 7 09/14/2022    CALCIUM 9.2 09/14/2022    PROT 8.6 (H) 09/14/2022    ALBUMIN 3.4 (L) 09/14/2022    BILITOT 0.6 09/14/2022    AST 15 09/14/2022    ALKPHOS 73 09/14/2022    ALT 12 09/14/2022       Lab Results   Component Value Date    COLORU Yellow 01/25/2019    APPEARANCEUA Clear 01/25/2019    SPECGRAV 1.020 01/25/2019    PHUR 8.0 01/25/2019    PROTEINUA Negative 01/25/2019    KETONESU Negative 01/25/2019    LEUKOCYTESUR Trace (A) 01/25/2019    NITRITE Negative 01/25/2019    UROBILINOGEN Negative 10/06/2015       Lab Results   Component Value Date    CRP 43.0 (H) 09/14/2022       Lab Results    Component Value Date    SEDRATE 29 (H) 09/14/2022       Lab Results   Component Value Date    RF 14.0 04/21/2022    SEDRATE 29 (H) 09/14/2022       No components found for: 25OHVITDTOT, 30MQJWRS2, 67NCZIGV3, METHODNOTE    Lab Results   Component Value Date    URICACID 6.9 (H) 09/14/2022       No components found for: TSPOTTB        Imaging:  All imaging reviewed and independently interpreted by me.         ASSESSMENT / PLAN:     Asha Paige is a 47 y.o. Black or  female with:      1. Rheumatoid arthritis, involving unspecified site, unspecified whether rheumatoid factor present  - patient with RA (+CCP, FHX of RA, Joint Pain and swelling)  - stable  - continue HCQ 400mg daily  - she has upcoming DERM appointment, if rash noted to be med allergy then will swtich   - update labs  - reassurance    2. Osteoarthritis  - in addition to RA she has OA  - likely cause of break thru pain   - discussed diagnosis and management  - CSI today, see procedure note, post procedure precautions discussed  - ice area  - wt loss  - NSAIDs PRN  - reassurance and exercise     3. Encounter for long-term (current) use of other medications  - annual EYE exam   - vaccines per guidelines  - immunosuppression/infectious precautions discussed     4. Other specified counseling  - over 10 minutes spent regarding below topics:  - Immunization counseling done.  - Weight loss counseling done.  - Nutrition and exercise counseling.  - Limitation of alcohol consumption.  - Regular exercise:  Aerobic and resistance.  - Medication counseling provided.    5. Morbid Obesity  - would benefit from decreasing at least 10% of body weight.  - recommended goal of losing 1 lb per week.  - consider nutritionist evaluation.      Follow up in about 3 months (around 8/3/2023).    Method of contact with patient concerns: [facility]    Disclaimer:  This note is prepared using voice recognition software and as such is likely to have  errors and has not been proof read. Please contact me for questions.     Time spent: 30 minutes in face to face discussion concerning diagnosis, prognosis, review of lab and test results, benefits of treatment as well as management of disease, counseling of patient and coordination of care between various health care providers.  Greater than half the time spent was used for coordination of care and counseling of patient.    Harsh Damian M.D.  Rheumatology Department   [facility]

## 2023-05-05 LAB
FINAL PATHOLOGIC DIAGNOSIS: NORMAL
HPV HR 12 DNA SPEC QL NAA+PROBE: NEGATIVE
HPV16 AG SPEC QL: NEGATIVE
HPV18 DNA SPEC QL NAA+PROBE: NEGATIVE
Lab: NORMAL

## 2023-05-11 ENCOUNTER — PATIENT MESSAGE (OUTPATIENT)
Dept: RHEUMATOLOGY | Facility: CLINIC | Age: 48
End: 2023-05-11
Payer: MEDICAID

## 2023-05-11 RX ORDER — ERGOCALCIFEROL 1.25 MG/1
50000 CAPSULE ORAL
Qty: 12 CAPSULE | Refills: 1 | Status: SHIPPED | OUTPATIENT
Start: 2023-05-11 | End: 2024-03-12

## 2023-05-18 ENCOUNTER — PATIENT MESSAGE (OUTPATIENT)
Dept: ADMINISTRATIVE | Facility: HOSPITAL | Age: 48
End: 2023-05-18
Payer: MEDICAID

## 2023-05-29 ENCOUNTER — PATIENT MESSAGE (OUTPATIENT)
Dept: ADMINISTRATIVE | Facility: HOSPITAL | Age: 48
End: 2023-05-29
Payer: MEDICAID

## 2023-05-29 ENCOUNTER — PATIENT MESSAGE (OUTPATIENT)
Dept: OBSTETRICS AND GYNECOLOGY | Facility: CLINIC | Age: 48
End: 2023-05-29
Payer: MEDICAID

## 2023-05-31 ENCOUNTER — TELEPHONE (OUTPATIENT)
Dept: OBSTETRICS AND GYNECOLOGY | Facility: HOSPITAL | Age: 48
End: 2023-05-31
Payer: MEDICAID

## 2023-05-31 DIAGNOSIS — N92.4 PERIMENOPAUSAL MENORRHAGIA: Primary | ICD-10-CM

## 2023-06-07 ENCOUNTER — HOSPITAL ENCOUNTER (OUTPATIENT)
Dept: RADIOLOGY | Facility: HOSPITAL | Age: 48
Discharge: HOME | End: 2023-06-07
Attending: OBSTETRICS & GYNECOLOGY
Payer: MEDICAID

## 2023-06-07 DIAGNOSIS — N92.4 PERIMENOPAUSAL MENORRHAGIA: ICD-10-CM

## 2023-06-07 PROCEDURE — 76830 US PELVIS COMP WITH TRANSVAG NON-OB (XPD): ICD-10-PCS | Mod: 26,,, | Performed by: STUDENT IN AN ORGANIZED HEALTH CARE EDUCATION/TRAINING PROGRAM

## 2023-06-07 PROCEDURE — 76856 US EXAM PELVIC COMPLETE: CPT | Mod: TC

## 2023-06-07 PROCEDURE — 76856 US PELVIS COMP WITH TRANSVAG NON-OB (XPD): ICD-10-PCS | Mod: 26,,, | Performed by: STUDENT IN AN ORGANIZED HEALTH CARE EDUCATION/TRAINING PROGRAM

## 2023-06-07 PROCEDURE — 76856 US EXAM PELVIC COMPLETE: CPT | Mod: 26,,, | Performed by: STUDENT IN AN ORGANIZED HEALTH CARE EDUCATION/TRAINING PROGRAM

## 2023-06-07 PROCEDURE — 76830 TRANSVAGINAL US NON-OB: CPT | Mod: 26,,, | Performed by: STUDENT IN AN ORGANIZED HEALTH CARE EDUCATION/TRAINING PROGRAM

## 2023-06-08 ENCOUNTER — PATIENT MESSAGE (OUTPATIENT)
Dept: OBSTETRICS AND GYNECOLOGY | Facility: CLINIC | Age: 48
End: 2023-06-08
Payer: MEDICAID

## 2023-06-09 NOTE — PROGRESS NOTES
Subjective:       Patient ID: Asha Paige is a 44 y.o. female.    Chief Complaint: Allergic Reaction (test /motrin reaction )      Ms. Paige is a 43 y/o F presenting for follow up.    She was seen in the emergency room 04/2020 due to hypertensive urgency. She states she has been taking her metoprolol as prescribed since then. She does not check her blood pressure at home. She denies headaches, vision changes, chest pain, orthopnea, DAVID.     She states that her shoulder pain has improved since she received a steroid injection. She states that her shoulder still feel stiff at times, but it is tolerable.     She states she has had lip swelling and urticaria when taking motrin, and she had a similar reaction when taking lisinopril a few years ago.    Patient states her anxiety has been manageable, but she has not been taking lexapro.       Review of Systems   Constitutional: Negative for chills, fatigue and fever.   Respiratory: Negative for cough, shortness of breath and wheezing.    Cardiovascular: Negative for chest pain, palpitations and leg swelling.   Gastrointestinal: Negative for abdominal pain, constipation, diarrhea, nausea and vomiting.   Endocrine: Negative.    Genitourinary: Negative.    Musculoskeletal: Positive for back pain.   Skin: Negative for color change, pallor and rash.   Neurological: Negative.    Psychiatric/Behavioral: Negative.           Past Medical History:   Diagnosis Date    History of pulmonary embolism 2005    Hypertension      Past Surgical History:   Procedure Laterality Date    FRACTURE SURGERY      Plate in right arm       VAGINAL DELIVERY       Family History   Problem Relation Age of Onset    Cancer Maternal Grandmother     Breast cancer Neg Hx     Ovarian cancer Neg Hx      Social History     Socioeconomic History    Marital status: Single     Spouse name: Not on file    Number of children: Not on file    Years of education: Not on file    Highest education level: Not  on file   Occupational History    Not on file   Social Needs    Financial resource strain: Not on file    Food insecurity     Worry: Not on file     Inability: Not on file    Transportation needs     Medical: Not on file     Non-medical: Not on file   Tobacco Use    Smoking status: Never Smoker    Smokeless tobacco: Never Used   Substance and Sexual Activity    Alcohol use: Never     Frequency: Never     Comment: Social     Drug use: No    Sexual activity: Yes     Partners: Male   Lifestyle    Physical activity     Days per week: Not on file     Minutes per session: Not on file    Stress: Not on file   Relationships    Social connections     Talks on phone: Not on file     Gets together: Not on file     Attends Synagogue service: Not on file     Active member of club or organization: Not on file     Attends meetings of clubs or organizations: Not on file     Relationship status: Not on file   Other Topics Concern    Not on file   Social History Narrative    Not on file       Current Outpatient Medications:     metoprolol succinate (TOPROL-XL) 100 MG 24 hr tablet, TAKE 1 TABLET BY MOUTH ONCE DAILY, Disp: 90 tablet, Rfl: 0    cetirizine (ZYRTEC) 10 MG tablet, Take 1 tablet (10 mg total) by mouth once daily., Disp: 30 tablet, Rfl: 2    cloNIDine 0.2 mg/24 hr td ptwk (CATAPRES) 0.2 mg/24 hr, Place 1 patch onto the skin every 7 days. (Patient not taking: Reported on 7/24/2020), Disp: 4 patch, Rfl: 1    tiZANidine (ZANAFLEX) 2 MG tablet, Take 1-2 tablets three times daily as needed for pain (Patient not taking: Reported on 7/24/2020), Disp: 30 tablet, Rfl: 0   Objective:      Vitals:    07/24/20 1545   BP: 134/86   BP Location: Left arm   Patient Position: Sitting   BP Method: Large (Manual)   Pulse: 83   Temp: 97.3 °F (36.3 °C)   TempSrc: Temporal   SpO2: 99%   Weight: 102.9 kg (226 lb 13.7 oz)   Height: 5' (1.524 m)       Physical Exam  Constitutional:       Appearance: Normal appearance.   HENT:       Head: Normocephalic and atraumatic.   Cardiovascular:      Rate and Rhythm: Normal rate and regular rhythm.      Pulses: Normal pulses.      Heart sounds: Normal heart sounds. No murmur. No friction rub. No gallop.    Pulmonary:      Effort: Pulmonary effort is normal. No respiratory distress.      Breath sounds: Normal breath sounds. No stridor. No wheezing, rhonchi or rales.   Abdominal:      Palpations: Abdomen is soft. There is no mass.      Tenderness: There is no abdominal tenderness. There is no guarding or rebound.      Hernia: No hernia is present.   Musculoskeletal:      Right lower leg: No edema.      Left lower leg: No edema.   Skin:     General: Skin is warm and dry.      Coloration: Skin is not jaundiced.      Findings: No bruising or erythema.   Neurological:      General: No focal deficit present.      Mental Status: She is alert.   Psychiatric:         Mood and Affect: Mood normal.         Behavior: Behavior normal.            Assessment:       1. Essential hypertension    2. Morbid obesity    3. Anxiety    4. Tendinitis of both rotator cuffs    5. Need for hepatitis C screening test    6. Angioedema, sequela        Plan:       Essential hypertension  BP controlled presently - reviewed anti-hypertensive regimen - continue current therapy  Patient counseled on importance of blood pressure monitoring and given resources for consideration of digital HTN program tracking  - Patient stable on metoprolol, continue current regimen   - CBC auto differential; Future; Expected date: 07/24/2020  -     Comprehensive metabolic panel; Future; Expected date: 07/24/2020  -     Lipid Panel; Future; Expected date: 07/24/2020  -     Hemoglobin A1C; Future; Expected date: 07/24/2020    Anxiety  Patient stable without medication/not on SSRI previously prescribed     Tendinitis of both rotator cuffs  Patient's pain well controlled presently s/p intra-articular steroid injection     Hereditary Angioedema    Patient with  high suspicion for angioedema possibly hereditary- referred to Allergy/Immunology   Counseled to avoid NSAIDs    Need for hepatitis C screening test  Patient born between 1943 and 1963 and consents to screening as recommended by USPSTF - Hepatitis C screening obtained  -     Hepatitis C Antibody; Future; Expected date: 07/24/2020      Follow up in about 8 weeks (around 9/18/2020) for f/u.          I hereby acknowledge that I am relying upon documentation authored by a medical student working under my supervision and further I hereby attest that I have verified the student documentation or findings by personally performing the physical exam and medical decision making activities of the Evaluation and Management service to be billed.        Geovanni Britton MD  Internal Medicine-Pediatrics     28

## 2023-06-28 DIAGNOSIS — Z12.31 OTHER SCREENING MAMMOGRAM: ICD-10-CM

## 2023-07-14 ENCOUNTER — OFFICE VISIT (OUTPATIENT)
Dept: OBSTETRICS AND GYNECOLOGY | Facility: CLINIC | Age: 48
End: 2023-07-14
Payer: MEDICAID

## 2023-07-14 VITALS
WEIGHT: 242.38 LBS | BODY MASS INDEX: 47.34 KG/M2 | DIASTOLIC BLOOD PRESSURE: 90 MMHG | SYSTOLIC BLOOD PRESSURE: 130 MMHG

## 2023-07-14 DIAGNOSIS — N92.4 PERIMENOPAUSAL MENORRHAGIA: Primary | ICD-10-CM

## 2023-07-14 PROCEDURE — 99213 PR OFFICE/OUTPT VISIT, EST, LEVL III, 20-29 MIN: ICD-10-PCS | Mod: S$PBB,,, | Performed by: OBSTETRICS & GYNECOLOGY

## 2023-07-14 PROCEDURE — 3008F BODY MASS INDEX DOCD: CPT | Mod: CPTII,,, | Performed by: OBSTETRICS & GYNECOLOGY

## 2023-07-14 PROCEDURE — 99213 OFFICE O/P EST LOW 20 MIN: CPT | Mod: PBBFAC | Performed by: OBSTETRICS & GYNECOLOGY

## 2023-07-14 PROCEDURE — 4010F PR ACE/ARB THEARPY RXD/TAKEN: ICD-10-PCS | Mod: CPTII,,, | Performed by: OBSTETRICS & GYNECOLOGY

## 2023-07-14 PROCEDURE — 99999 PR PBB SHADOW E&M-EST. PATIENT-LVL III: CPT | Mod: PBBFAC,,, | Performed by: OBSTETRICS & GYNECOLOGY

## 2023-07-14 PROCEDURE — 3080F DIAST BP >= 90 MM HG: CPT | Mod: CPTII,,, | Performed by: OBSTETRICS & GYNECOLOGY

## 2023-07-14 PROCEDURE — 99999 PR PBB SHADOW E&M-EST. PATIENT-LVL III: ICD-10-PCS | Mod: PBBFAC,,, | Performed by: OBSTETRICS & GYNECOLOGY

## 2023-07-14 PROCEDURE — 3075F PR MOST RECENT SYSTOLIC BLOOD PRESS GE 130-139MM HG: ICD-10-PCS | Mod: CPTII,,, | Performed by: OBSTETRICS & GYNECOLOGY

## 2023-07-14 PROCEDURE — 3075F SYST BP GE 130 - 139MM HG: CPT | Mod: CPTII,,, | Performed by: OBSTETRICS & GYNECOLOGY

## 2023-07-14 PROCEDURE — 99213 OFFICE O/P EST LOW 20 MIN: CPT | Mod: S$PBB,,, | Performed by: OBSTETRICS & GYNECOLOGY

## 2023-07-14 PROCEDURE — 1159F MED LIST DOCD IN RCRD: CPT | Mod: CPTII,,, | Performed by: OBSTETRICS & GYNECOLOGY

## 2023-07-14 PROCEDURE — 3008F PR BODY MASS INDEX (BMI) DOCUMENTED: ICD-10-PCS | Mod: CPTII,,, | Performed by: OBSTETRICS & GYNECOLOGY

## 2023-07-14 PROCEDURE — 3080F PR MOST RECENT DIASTOLIC BLOOD PRESSURE >= 90 MM HG: ICD-10-PCS | Mod: CPTII,,, | Performed by: OBSTETRICS & GYNECOLOGY

## 2023-07-14 PROCEDURE — 4010F ACE/ARB THERAPY RXD/TAKEN: CPT | Mod: CPTII,,, | Performed by: OBSTETRICS & GYNECOLOGY

## 2023-07-14 PROCEDURE — 1159F PR MEDICATION LIST DOCUMENTED IN MEDICAL RECORD: ICD-10-PCS | Mod: CPTII,,, | Performed by: OBSTETRICS & GYNECOLOGY

## 2023-07-15 NOTE — PROGRESS NOTES
Cc:  results discussion    HPI:  Pt who was originally seen 4/28/23 for annual exam and had c/o heavier menses than normal.  Pelvic sono ordered and pt here to discuss results.    Pelvic Sono:  Uterus:   Size: 7.8 x 4.3 x 5.8 cm   Masses: Intramural fibroid at the fundus measuring 1.6 cm.   Endometrium: Normal in this pre menopausal patient, measuring 6 mm.     Right ovary:   Size: 3.6 x 2.6 x 2.5 cm   Appearance: Simple cyst measuring 3.3 cm.   Vascular flow: Normal.     Left ovary:   Size: 2.3 x 1.5 x 1.7 cm   Appearance: Normal follicle.   Vascular Flow: Normal.     Free Fluid:   Trace pelvic free fluid, likely physiologic.     Impression:   1. Uterine fibroid.  2. 3.3 cm simple right ovarian cyst, no follow-up required    20 minute discussion  Discussed pt is not candidate for conventional hormonal interventions for bleeding due to hx of PE/DVT.  Pt does not desire surgical intervention at this time.  Pt's bleeding is manageable and willing to simply observe for now.  Discussed Mirena IUD as possible treatment which pt is willing to try if needed    Assessment/Plan  1. Perimenopausal menorrhagia      Pt considering Mirena IUD if bleeding worsens

## 2023-07-17 NOTE — TELEPHONE ENCOUNTER
I called to speak with pt about rescheduling her appt from 05/12/20 @ [facility] with Dr Caridad Agudelo, and someone picked up the phone and did not speak, I tried 2 more times and got no response from person on the other end.  Pt was a NO SHOW for scheduled XR and OV on 05/12/20.   PHYSICAL THERAPY TREATMENT          Patient Name: Jairo MITCHELLR'S Date: 7/17/2023 07/17/23 1201   Note Type   Note Type Treatment   Pain Assessment   Pain Assessment Tool 0-10   Pain Score 2   Pain Location/Orientation Location: Back   Patient's Stated Pain Goal No pain   Hospital Pain Intervention(s) Repositioned; Ambulation/increased activity; Elevated; Emotional support; Rest   Precautions   Spinal Precautions Lumbar spine precautions   Restrictions/Precautions   Weight Bearing Precautions Per Order No   Braces or Orthoses LSO  (LSO to be worn when OOB per NSX)   Other Precautions Telemetry; Fall Risk;Pain;Spinal precautions   General   Chart Reviewed Yes   Response to Previous Treatment Patient with no complaints from previous session. Family/Caregiver Present No   Cognition   Overall Cognitive Status WFL   Arousal/Participation Cooperative   Attention Within functional limits   Orientation Level Oriented X4   Memory Within functional limits   Following Commands Follows all commands and directions without difficulty   Comments Pt with good safety awareness and insight to current condition. Bed Mobility   Rolling R 5  Supervision   Additional items Increased time required   Supine to Sit 4  Minimal assistance   Additional items Assist x 1; Increased time required;Verbal cues;HOB elevated   Transfers   Sit to Stand 4  Minimal assistance  (Completed 4x t/o session)   Additional items Assist x 1; Increased time required;Verbal cues   Stand to Sit 4  Minimal assistance   Additional items Assist x 1; Increased time required;Verbal cues   Toilet transfer 3  Moderate assistance   Additional items Assist x 1; Increased time required;Verbal cues;Standard toilet  (Increased assistance required due to lower toilet seat.)   Additional Comments RW used for transfers and ambulation   Ambulation/Elevation   Gait pattern Improper Weight shift;Decreased foot clearance; Short stride; Excessively slow;Decreased hip extension;Decreased heel strike;Decreased toe off;Forward Flexion   Gait Assistance 4  Minimal assist   Additional items Assist x 1;Verbal cues   Assistive Device Rolling walker   Distance 50 ft x2   Balance   Static Sitting Fair +   Dynamic Sitting Fair   Static Standing Fair   Dynamic Standing Fair -   Ambulatory Fair -   Activity Tolerance   Activity Tolerance Patient limited by fatigue   Medical Staff Made Stas Oats, PT   Nurse Made Aware Okay per RN   Exercises   Glute Sets Sitting;10 reps;Bilateral   Knee AROM Long Arc Quad Sitting;10 reps;Bilateral   Ankle Pumps Sitting;20 reps;Bilateral   Assessment   Prognosis Good   Problem List Decreased strength;Decreased endurance;Decreased range of motion; Impaired balance;Decreased mobility;Pain   Assessment Pt supine in bed at time of PT treatment. Pt A&O x4 and reports 2/10 pain in her back. Pt is Chelly x1 for bed mobility, STS transfers, and ambulation. Pt required modA x1 for toilet transfer due to lower toilet seat. Pt tolerated ambulation to 50 ft x2 before fatigue set in limiting pt's ability to ambulate further. Pt was able to maintain 3 mins of static standing with RW for assistance with lower body dressing and waiting for nurse to give suppository. Pt demonstrates ability to perform seated therapeutic exercises safely and independently without c/o increased pain or fatigue. Pt educated on the importance of continuing to perform exercises independently during hospital stay. Pt additionally educated on the importance of calling for assistance when wanting to get out of the chair d/t increased fall risk. Pt verbalized understanding. Pt's current functional limitations include LSO brace and spinal precautions, decreased LE strength and ROM, decreased endurance, impaired balance, pain, fall risk, decreased IND.  Following PT treatment, pt was left OOB in chair with phone, call bell, and personal belongings in reach. PT continues to recommend D/C to rehab once medically cleared. Will continue to follow as able. Goals   Patient Goals To go home   STG Expiration Date 07/28/23   PT Treatment Day 1   Plan   Treatment/Interventions Functional transfer training;LE strengthening/ROM; Elevations; Endurance training; Therapeutic exercise;Patient/family training;Equipment eval/education; Bed mobility;Gait training; Compensatory technique education   Progress Progressing toward goals   PT Frequency 3-5x/wk   Recommendation   PT Discharge Recommendation Post acute rehabilitation services   Equipment Recommended [facility] Recommended Wheeled walker   AM-PAC Basic Mobility Inpatient   Turning in Flat Bed Without Bedrails 3   Lying on Back to Sitting on Edge of Flat Bed Without Bedrails 3   Moving Bed to Chair 3   Standing Up From Chair Using Arms 3   Walk in Room 3   Climb 3-5 Stairs With Railing 1   Basic Mobility Inpatient Raw Score 16   Basic Mobility Standardized Score 38.32   Highest Level Of Mobility   JH-HLM Goal 5: Stand one or more mins   JH-HLM Achieved 7: Walk 25 feet or more   Education   Education Provided Home exercise program;Other  (Spinal precautions)   Patient Demonstrates acceptance/verbal understanding   End of Consult   Patient Position at End of Consult Bedside chair; All needs within reach   Jabier Nguyen, ARLENE

## 2023-07-19 ENCOUNTER — PATIENT MESSAGE (OUTPATIENT)
Dept: RHEUMATOLOGY | Facility: CLINIC | Age: 48
End: 2023-07-19
Payer: MEDICAID

## 2023-08-15 ENCOUNTER — OFFICE VISIT (OUTPATIENT)
Dept: OBSTETRICS AND GYNECOLOGY | Facility: CLINIC | Age: 48
End: 2023-08-15
Payer: MEDICAID

## 2023-08-15 VITALS
SYSTOLIC BLOOD PRESSURE: 124 MMHG | DIASTOLIC BLOOD PRESSURE: 82 MMHG | BODY MASS INDEX: 47.79 KG/M2 | WEIGHT: 244.69 LBS

## 2023-08-15 DIAGNOSIS — N92.4 PERIMENOPAUSAL MENORRHAGIA: Primary | ICD-10-CM

## 2023-08-15 PROCEDURE — 1159F MED LIST DOCD IN RCRD: CPT | Mod: CPTII,,, | Performed by: OBSTETRICS & GYNECOLOGY

## 2023-08-15 PROCEDURE — 3074F SYST BP LT 130 MM HG: CPT | Mod: CPTII,,, | Performed by: OBSTETRICS & GYNECOLOGY

## 2023-08-15 PROCEDURE — 3079F DIAST BP 80-89 MM HG: CPT | Mod: CPTII,,, | Performed by: OBSTETRICS & GYNECOLOGY

## 2023-08-15 PROCEDURE — 3008F BODY MASS INDEX DOCD: CPT | Mod: CPTII,,, | Performed by: OBSTETRICS & GYNECOLOGY

## 2023-08-15 PROCEDURE — 99999 PR PBB SHADOW E&M-EST. PATIENT-LVL III: ICD-10-PCS | Mod: PBBFAC,,, | Performed by: OBSTETRICS & GYNECOLOGY

## 2023-08-15 PROCEDURE — 1159F PR MEDICATION LIST DOCUMENTED IN MEDICAL RECORD: ICD-10-PCS | Mod: CPTII,,, | Performed by: OBSTETRICS & GYNECOLOGY

## 2023-08-15 PROCEDURE — 3079F PR MOST RECENT DIASTOLIC BLOOD PRESSURE 80-89 MM HG: ICD-10-PCS | Mod: CPTII,,, | Performed by: OBSTETRICS & GYNECOLOGY

## 2023-08-15 PROCEDURE — 4010F ACE/ARB THERAPY RXD/TAKEN: CPT | Mod: CPTII,,, | Performed by: OBSTETRICS & GYNECOLOGY

## 2023-08-15 PROCEDURE — 99213 PR OFFICE/OUTPT VISIT, EST, LEVL III, 20-29 MIN: ICD-10-PCS | Mod: S$PBB,,, | Performed by: OBSTETRICS & GYNECOLOGY

## 2023-08-15 PROCEDURE — 99213 OFFICE O/P EST LOW 20 MIN: CPT | Mod: PBBFAC | Performed by: OBSTETRICS & GYNECOLOGY

## 2023-08-15 PROCEDURE — 4010F PR ACE/ARB THEARPY RXD/TAKEN: ICD-10-PCS | Mod: CPTII,,, | Performed by: OBSTETRICS & GYNECOLOGY

## 2023-08-15 PROCEDURE — 99213 OFFICE O/P EST LOW 20 MIN: CPT | Mod: S$PBB,,, | Performed by: OBSTETRICS & GYNECOLOGY

## 2023-08-15 PROCEDURE — 99999 PR PBB SHADOW E&M-EST. PATIENT-LVL III: CPT | Mod: PBBFAC,,, | Performed by: OBSTETRICS & GYNECOLOGY

## 2023-08-15 PROCEDURE — 3074F PR MOST RECENT SYSTOLIC BLOOD PRESSURE < 130 MM HG: ICD-10-PCS | Mod: CPTII,,, | Performed by: OBSTETRICS & GYNECOLOGY

## 2023-08-15 PROCEDURE — 3008F PR BODY MASS INDEX (BMI) DOCUMENTED: ICD-10-PCS | Mod: CPTII,,, | Performed by: OBSTETRICS & GYNECOLOGY

## 2023-08-15 NOTE — PROGRESS NOTES
Cc:  treatment for heavy menses    HPI:  47 yr  who was last seen 2023 and discussed her limited treatment options for her menorrhagia.  Pt has hx of PE so estrogen is contraindicated and systemic progestins are not ideal.  We had discussed Mirena IUD as treatment which pt is now ready to have placed.    Vitals:    08/15/23 0845   BP: 124/82   Weight: 111 kg (244 lb 11.4 oz)     Physical Exam:   Constitutional: She is oriented to person, place, and time. She appears well-developed and well-nourished. No distress.    HENT:   Head: Normocephalic and atraumatic.     Neck: No thyromegaly present.     Pulmonary/Chest: Effort normal. No respiratory distress.        Abdominal: Soft. She exhibits no distension and no mass. There is no abdominal tenderness. There is no rebound and no guarding.             Musculoskeletal: Normal range of motion and moves all extremeties. No tenderness.       Neurological: She is alert and oriented to person, place, and time. No cranial nerve deficit. Coordination normal.    Skin: She is not diaphoretic.    Psychiatric: She has a normal mood and affect. Her behavior is normal. Judgment and thought content normal.     Assessment/Plan  1. Perimenopausal menorrhagia  Device Authorization Order        Mirena ordered, to be placed in approx 1-2 wks

## 2023-08-31 ENCOUNTER — PATIENT MESSAGE (OUTPATIENT)
Dept: OBSTETRICS AND GYNECOLOGY | Facility: CLINIC | Age: 48
End: 2023-08-31
Payer: MEDICAID

## 2023-09-01 ENCOUNTER — TELEPHONE (OUTPATIENT)
Dept: OBSTETRICS AND GYNECOLOGY | Facility: CLINIC | Age: 48
End: 2023-09-01
Payer: MEDICAID

## 2023-09-01 NOTE — TELEPHONE ENCOUNTER
Contacted pt to reschedule appt due to provider out. Appt moved to 9/8/23    ----- Message from Krupa Gillis sent at 9/1/2023 10:34 AM CDT -----  Type: Patient Call Back    Who called:  pt     What is the request in detail:pt requesting to reschedule procedure. Call pt    Can the clinic reply by [facility]?    Would the patient rather a call back or a response via [facility]? call    Best call back number:[phone] (home)       Additional Information:

## 2023-09-08 ENCOUNTER — PROCEDURE VISIT (OUTPATIENT)
Dept: OBSTETRICS AND GYNECOLOGY | Facility: CLINIC | Age: 48
End: 2023-09-08
Payer: MEDICAID

## 2023-09-08 VITALS
DIASTOLIC BLOOD PRESSURE: 80 MMHG | WEIGHT: 244.69 LBS | BODY MASS INDEX: 47.79 KG/M2 | SYSTOLIC BLOOD PRESSURE: 122 MMHG

## 2023-09-08 DIAGNOSIS — Z30.430 ENCOUNTER FOR IUD INSERTION: Primary | ICD-10-CM

## 2023-09-08 DIAGNOSIS — M06.9 RHEUMATOID ARTHRITIS, INVOLVING UNSPECIFIED SITE, UNSPECIFIED WHETHER RHEUMATOID FACTOR PRESENT: ICD-10-CM

## 2023-09-08 PROCEDURE — 99499 UNLISTED E&M SERVICE: CPT | Mod: S$PBB,,, | Performed by: OBSTETRICS & GYNECOLOGY

## 2023-09-08 PROCEDURE — 99499 NO LOS: ICD-10-PCS | Mod: S$PBB,,, | Performed by: OBSTETRICS & GYNECOLOGY

## 2023-09-08 PROCEDURE — 99999PBSHW PR PBB SHADOW TECHNICAL ONLY FILED TO HB: ICD-10-PCS | Mod: PBBFAC,,,

## 2023-09-08 PROCEDURE — 58300 INSERTION OF IUD: ICD-10-PCS | Mod: S$PBB,,, | Performed by: OBSTETRICS & GYNECOLOGY

## 2023-09-08 PROCEDURE — 58300 INSERT INTRAUTERINE DEVICE: CPT | Mod: PBBFAC | Performed by: OBSTETRICS & GYNECOLOGY

## 2023-09-08 PROCEDURE — 99999PBSHW PR PBB SHADOW TECHNICAL ONLY FILED TO HB: Mod: PBBFAC,,,

## 2023-09-08 RX ADMIN — LEVONORGESTREL 1 EACH: 52 INTRAUTERINE DEVICE INTRAUTERINE at 03:09

## 2023-09-08 NOTE — PROCEDURES
Insertion of IUD    Date/Time: 9/8/2023 3:00 PM    Performed by: Bill Tatum MD  Authorized by: Bill Tatum MD    Consent:     Consent given by:  Patient    Procedure risks and benefits discussed: yes      Patient questions answered: yes      Patient agrees, verbalizes understanding, and wants to proceed: yes      Educational handouts given: no      Instructions and paperwork completed: yes    Procedure:     Pelvic exam performed: no      Negative GC/chlamydia test: no      Negative urine pregnancy test: yes      Negative serum pregnancy test: no      Cervix cleaned and prepped: yes (betadine x 3)      Speculum placed in vagina: yes      Tenaculum applied to cervix: no      Uterus sounded: yes      Uterus sound depth (cm):  7.5    IUD inserted with no complications: yes      Strings trimmed: yes    1 each Mirena IUD     Post-procedure:     Patient tolerated procedure well: yes      Patient will follow up after next period: yes

## 2023-09-10 ENCOUNTER — PATIENT MESSAGE (OUTPATIENT)
Dept: RHEUMATOLOGY | Facility: CLINIC | Age: 48
End: 2023-09-10
Payer: MEDICAID

## 2023-09-11 RX ORDER — HYDROXYCHLOROQUINE SULFATE 200 MG/1
200 TABLET, FILM COATED ORAL 2 TIMES DAILY
Qty: 60 TABLET | Refills: 6 | Status: SHIPPED | OUTPATIENT
Start: 2023-09-11

## 2023-09-26 ENCOUNTER — PATIENT OUTREACH (OUTPATIENT)
Dept: ADMINISTRATIVE | Facility: HOSPITAL | Age: 48
End: 2023-09-26
Payer: MEDICAID

## 2023-09-26 ENCOUNTER — PATIENT MESSAGE (OUTPATIENT)
Dept: ADMINISTRATIVE | Facility: HOSPITAL | Age: 48
End: 2023-09-26
Payer: MEDICAID

## 2023-10-13 ENCOUNTER — OFFICE VISIT (OUTPATIENT)
Dept: OBSTETRICS AND GYNECOLOGY | Facility: CLINIC | Age: 48
End: 2023-10-13
Payer: MEDICAID

## 2023-10-13 VITALS
SYSTOLIC BLOOD PRESSURE: 120 MMHG | DIASTOLIC BLOOD PRESSURE: 74 MMHG | BODY MASS INDEX: 46.93 KG/M2 | WEIGHT: 240.31 LBS

## 2023-10-13 DIAGNOSIS — Z97.5 BREAKTHROUGH BLEEDING WITH IUD: Primary | ICD-10-CM

## 2023-10-13 DIAGNOSIS — N92.1 BREAKTHROUGH BLEEDING WITH IUD: Primary | ICD-10-CM

## 2023-10-13 PROCEDURE — 3078F PR MOST RECENT DIASTOLIC BLOOD PRESSURE < 80 MM HG: ICD-10-PCS | Mod: CPTII,,, | Performed by: OBSTETRICS & GYNECOLOGY

## 2023-10-13 PROCEDURE — 99213 OFFICE O/P EST LOW 20 MIN: CPT | Mod: PBBFAC | Performed by: OBSTETRICS & GYNECOLOGY

## 2023-10-13 PROCEDURE — 99999 PR PBB SHADOW E&M-EST. PATIENT-LVL III: ICD-10-PCS | Mod: PBBFAC,,, | Performed by: OBSTETRICS & GYNECOLOGY

## 2023-10-13 PROCEDURE — 4010F ACE/ARB THERAPY RXD/TAKEN: CPT | Mod: CPTII,,, | Performed by: OBSTETRICS & GYNECOLOGY

## 2023-10-13 PROCEDURE — 99999 PR PBB SHADOW E&M-EST. PATIENT-LVL III: CPT | Mod: PBBFAC,,, | Performed by: OBSTETRICS & GYNECOLOGY

## 2023-10-13 PROCEDURE — 1159F MED LIST DOCD IN RCRD: CPT | Mod: CPTII,,, | Performed by: OBSTETRICS & GYNECOLOGY

## 2023-10-13 PROCEDURE — 1159F PR MEDICATION LIST DOCUMENTED IN MEDICAL RECORD: ICD-10-PCS | Mod: CPTII,,, | Performed by: OBSTETRICS & GYNECOLOGY

## 2023-10-13 PROCEDURE — 3008F BODY MASS INDEX DOCD: CPT | Mod: CPTII,,, | Performed by: OBSTETRICS & GYNECOLOGY

## 2023-10-13 PROCEDURE — 3078F DIAST BP <80 MM HG: CPT | Mod: CPTII,,, | Performed by: OBSTETRICS & GYNECOLOGY

## 2023-10-13 PROCEDURE — 3074F SYST BP LT 130 MM HG: CPT | Mod: CPTII,,, | Performed by: OBSTETRICS & GYNECOLOGY

## 2023-10-13 PROCEDURE — 3074F PR MOST RECENT SYSTOLIC BLOOD PRESSURE < 130 MM HG: ICD-10-PCS | Mod: CPTII,,, | Performed by: OBSTETRICS & GYNECOLOGY

## 2023-10-13 PROCEDURE — 99213 OFFICE O/P EST LOW 20 MIN: CPT | Mod: S$PBB,,, | Performed by: OBSTETRICS & GYNECOLOGY

## 2023-10-13 PROCEDURE — 99213 PR OFFICE/OUTPT VISIT, EST, LEVL III, 20-29 MIN: ICD-10-PCS | Mod: S$PBB,,, | Performed by: OBSTETRICS & GYNECOLOGY

## 2023-10-13 PROCEDURE — 4010F PR ACE/ARB THEARPY RXD/TAKEN: ICD-10-PCS | Mod: CPTII,,, | Performed by: OBSTETRICS & GYNECOLOGY

## 2023-10-13 PROCEDURE — 3008F PR BODY MASS INDEX (BMI) DOCUMENTED: ICD-10-PCS | Mod: CPTII,,, | Performed by: OBSTETRICS & GYNECOLOGY

## 2023-10-13 RX ORDER — NORETHINDRONE 5 MG/1
5 TABLET ORAL DAILY
Qty: 10 TABLET | Refills: 0 | Status: SHIPPED | OUTPATIENT
Start: 2023-10-13 | End: 2024-03-12

## 2023-10-13 NOTE — PROGRESS NOTES
Cc:  IUD check    HPI:  48 yr  who had Mirena IUD placed 23 presents for IUD check.  Pt denies any pain; however, pt has been bleeding since placement.  Pt has not been sexually active since placement.  Pt has hx of PE.    Vitals:    10/13/23 1445   BP: 120/74   Weight: 109 kg (240 lb 4.8 oz)     Physical Exam:   Constitutional: She is oriented to person, place, and time. She appears well-developed and well-nourished. No distress.    HENT:   Head: Normocephalic and atraumatic.       Pulmonary/Chest: Effort normal. No respiratory distress.        Abdominal: Soft. She exhibits no distension and no mass. There is no abdominal tenderness. There is no rebound and no guarding.     Genitourinary:    Urethra, bladder and uterus normal.      Pelvic exam was performed with patient in the lithotomy position.   The external female genitalia was normal.   No external genitalia lesions identified,Genitalia hair distrobution normal .   Labial bartholins normal.There is no rash, tenderness, lesion or injury on the right labia. There is no rash, tenderness, lesion or injury on the left labia. Vagina exhibits no lesion. There is vaginal discharge (bloody) in the vagina. No erythema, tenderness, bleeding, rectocele, cystocele or unspecified prolapse of vaginal walls in the vagina.    No foreign body in the vagina.      No signs of injury in the vagina.   Vagina was moist.Cervix exhibits no motion tenderness, no lesion, no discharge, no friability, no lesion, no tenderness and no polyp. Normal urethral meatus.Urethral Meatus exhibits: urethral lesion and prolapsedUrethra findings: no urethral mass, no tenderness and no urethral scarringBladder findings: no bladder distention and no bladder tendernessIUD strings visualized.              Neurological: She is alert and oriented to person, place, and time. No cranial nerve deficit. Coordination normal.    Skin: She is not diaphoretic.    Psychiatric: She has a normal mood and  affect. Her behavior is normal. Judgment and thought content normal.     Assessment/Plan  1. Breakthrough bleeding with IUD  norethindrone (AYGESTIN) 5 mg Tab        Rx aygestin 5 mg qd x 10 to try to natalia bleeding

## 2023-10-29 ENCOUNTER — PATIENT MESSAGE (OUTPATIENT)
Dept: OBSTETRICS AND GYNECOLOGY | Facility: CLINIC | Age: 48
End: 2023-10-29
Payer: MEDICAID

## 2023-10-30 ENCOUNTER — PATIENT MESSAGE (OUTPATIENT)
Dept: RHEUMATOLOGY | Facility: CLINIC | Age: 48
End: 2023-10-30
Payer: MEDICAID

## 2023-10-30 DIAGNOSIS — M19.90 OSTEOARTHRITIS, UNSPECIFIED OSTEOARTHRITIS TYPE, UNSPECIFIED SITE: Primary | ICD-10-CM

## 2023-11-21 DIAGNOSIS — F41.9 ANXIETY: ICD-10-CM

## 2023-11-21 RX ORDER — ESCITALOPRAM OXALATE 20 MG/1
TABLET ORAL
Qty: 90 TABLET | Refills: 0 | Status: SHIPPED | OUTPATIENT
Start: 2023-11-21 | End: 2024-03-07

## 2023-11-21 NOTE — TELEPHONE ENCOUNTER
Care Due:                  Date            Visit Type   Department     Provider  --------------------------------------------------------------------------------                                EP [facility]                              PRIMARY      MED/ INTERNAL  Last Visit: 01-      CARE ([facility])   MED/ PEDS      Geovanni Britton  Next Visit: None Scheduled  None         None Found                                                            Last  Test          Frequency    Reason                     Performed    Due Date  --------------------------------------------------------------------------------    Office Visit  12 months..  meloxicam................  01- 01-    [facility] Embedded Care Due Messages. Reference number: [id number].   11/21/2023 3:07:11 PM CST

## 2024-01-04 ENCOUNTER — PATIENT MESSAGE (OUTPATIENT)
Dept: ADMINISTRATIVE | Facility: HOSPITAL | Age: 49
End: 2024-01-04
Payer: MEDICAID

## 2024-03-04 ENCOUNTER — PATIENT MESSAGE (OUTPATIENT)
Dept: ADMINISTRATIVE | Facility: HOSPITAL | Age: 49
End: 2024-03-04
Payer: MEDICAID

## 2024-03-07 DIAGNOSIS — F41.9 ANXIETY: ICD-10-CM

## 2024-03-07 RX ORDER — ESCITALOPRAM OXALATE 20 MG/1
TABLET ORAL
Qty: 90 TABLET | Refills: 0 | Status: SHIPPED | OUTPATIENT
Start: 2024-03-07 | End: 2024-06-07

## 2024-03-08 NOTE — TELEPHONE ENCOUNTER
Care Due:                  Date            Visit Type   Department     Provider  --------------------------------------------------------------------------------                                EP [facility]                              PRIMARY      MED/ INTERNAL  Last Visit: 01-      CARE ([facility])   MED/ PEDS      Geovanni Britton                              [facility]                              ANNUAL       [facility]                              CHECKUP/PHY  MED/ INTERNAL  Next Visit: 03-      S            MED/ PEDRAFFI Britton                                                            Last  Test          Frequency    Reason                     Performed    Due Date  --------------------------------------------------------------------------------    CBC.........  12 months..  meloxicam................  05- 04-    CMP.........  12 months..  irbesartan, meloxicam....  05- 04-    [employer] Embedded Care Due Messages. Reference number: [id number].   3/07/2024 7:04:01 PM CST

## 2024-03-08 NOTE — TELEPHONE ENCOUNTER
Provider Staff:  Action required for this patient    Requires labs      Please see care gap opportunities below in Care Due Message.    Thanks!  [facility]     Appointments      Date Provider   Last Visit   1/24/2023 Geovanni Britton MD   Next Visit   3/12/2024 Geovanni Britton MD     Refill Decision Note   Asha Paige  is requesting a refill authorization.  Brief Assessment and Rationale for Refill:  Approve     Medication Therapy Plan:         Comments:     Note composed:9:07 PM 03/07/2024

## 2024-03-12 ENCOUNTER — LAB VISIT (OUTPATIENT)
Dept: LAB | Facility: HOSPITAL | Age: 49
End: 2024-03-12
Attending: INTERNAL MEDICINE
Payer: MEDICAID

## 2024-03-12 ENCOUNTER — OFFICE VISIT (OUTPATIENT)
Dept: FAMILY MEDICINE | Facility: CLINIC | Age: 49
End: 2024-03-12
Payer: MEDICAID

## 2024-03-12 VITALS
TEMPERATURE: 99 F | OXYGEN SATURATION: 96 % | WEIGHT: 256.06 LBS | DIASTOLIC BLOOD PRESSURE: 82 MMHG | SYSTOLIC BLOOD PRESSURE: 120 MMHG | BODY MASS INDEX: 50.27 KG/M2 | HEIGHT: 60 IN | HEART RATE: 80 BPM

## 2024-03-12 DIAGNOSIS — E66.01 CLASS 3 OBESITY: ICD-10-CM

## 2024-03-12 DIAGNOSIS — Z00.00 ROUTINE ADULT HEALTH MAINTENANCE: Primary | ICD-10-CM

## 2024-03-12 DIAGNOSIS — N39.46 MIXED STRESS AND URGE URINARY INCONTINENCE: ICD-10-CM

## 2024-03-12 DIAGNOSIS — M17.0 PRIMARY OSTEOARTHRITIS OF BOTH KNEES: ICD-10-CM

## 2024-03-12 DIAGNOSIS — R19.4 CHANGE IN BOWEL HABITS: ICD-10-CM

## 2024-03-12 DIAGNOSIS — I10 ESSENTIAL HYPERTENSION: ICD-10-CM

## 2024-03-12 DIAGNOSIS — Z12.11 ENCOUNTER FOR FIT (FECAL IMMUNOCHEMICAL TEST) SCREENING: ICD-10-CM

## 2024-03-12 DIAGNOSIS — L40.9 PSORIASIS: ICD-10-CM

## 2024-03-12 DIAGNOSIS — Z79.899 LONG-TERM USE OF PLAQUENIL: ICD-10-CM

## 2024-03-12 DIAGNOSIS — Z00.00 ROUTINE ADULT HEALTH MAINTENANCE: ICD-10-CM

## 2024-03-12 DIAGNOSIS — M06.9 RHEUMATOID ARTHRITIS INVOLVING MULTIPLE SITES, UNSPECIFIED WHETHER RHEUMATOID FACTOR PRESENT: ICD-10-CM

## 2024-03-12 LAB — ERYTHROCYTE [SEDIMENTATION RATE] IN BLOOD BY PHOTOMETRIC METHOD: 66 MM/HR (ref 0–36)

## 2024-03-12 PROCEDURE — 80053 COMPREHEN METABOLIC PANEL: CPT | Performed by: INTERNAL MEDICINE

## 2024-03-12 PROCEDURE — 1159F MED LIST DOCD IN RCRD: CPT | Mod: CPTII,,, | Performed by: INTERNAL MEDICINE

## 2024-03-12 PROCEDURE — 83036 HEMOGLOBIN GLYCOSYLATED A1C: CPT | Performed by: INTERNAL MEDICINE

## 2024-03-12 PROCEDURE — 99213 OFFICE O/P EST LOW 20 MIN: CPT | Mod: PBBFAC,PO | Performed by: INTERNAL MEDICINE

## 2024-03-12 PROCEDURE — 3079F DIAST BP 80-89 MM HG: CPT | Mod: CPTII,,, | Performed by: INTERNAL MEDICINE

## 2024-03-12 PROCEDURE — 3008F BODY MASS INDEX DOCD: CPT | Mod: CPTII,,, | Performed by: INTERNAL MEDICINE

## 2024-03-12 PROCEDURE — 36415 COLL VENOUS BLD VENIPUNCTURE: CPT | Mod: PO | Performed by: INTERNAL MEDICINE

## 2024-03-12 PROCEDURE — 99396 PREV VISIT EST AGE 40-64: CPT | Mod: S$PBB,,, | Performed by: INTERNAL MEDICINE

## 2024-03-12 PROCEDURE — 99215 OFFICE O/P EST HI 40 MIN: CPT | Mod: 25,S$PBB,, | Performed by: INTERNAL MEDICINE

## 2024-03-12 PROCEDURE — 3074F SYST BP LT 130 MM HG: CPT | Mod: CPTII,,, | Performed by: INTERNAL MEDICINE

## 2024-03-12 PROCEDURE — 86140 C-REACTIVE PROTEIN: CPT | Performed by: INTERNAL MEDICINE

## 2024-03-12 PROCEDURE — 99999 PR PBB SHADOW E&M-EST. PATIENT-LVL III: CPT | Mod: PBBFAC,,, | Performed by: INTERNAL MEDICINE

## 2024-03-12 PROCEDURE — 85652 RBC SED RATE AUTOMATED: CPT | Performed by: INTERNAL MEDICINE

## 2024-03-12 PROCEDURE — 80061 LIPID PANEL: CPT | Performed by: INTERNAL MEDICINE

## 2024-03-12 RX ORDER — SEMAGLUTIDE 0.25 MG/.5ML
0.25 INJECTION, SOLUTION SUBCUTANEOUS
Qty: 4 EACH | Refills: 0 | Status: SHIPPED | OUTPATIENT
Start: 2024-03-12 | End: 2024-03-29

## 2024-03-12 RX ORDER — TACROLIMUS 1 MG/G
OINTMENT TOPICAL 2 TIMES DAILY
Qty: 60 G | Refills: 0 | Status: SHIPPED | OUTPATIENT
Start: 2024-03-12 | End: 2024-05-11

## 2024-03-12 RX ORDER — CELECOXIB 200 MG/1
200 CAPSULE ORAL 2 TIMES DAILY PRN
Qty: 60 CAPSULE | Refills: 0 | Status: SHIPPED | OUTPATIENT
Start: 2024-03-12 | End: 2024-03-21 | Stop reason: SDUPTHER

## 2024-03-12 NOTE — PROGRESS NOTES
Subjective:     Chief Complaint   Patient presents with    Annual Exam       HPI  Asha Paige is a 48 y.o. female with medical diagnoses as listed in the medical history and problem list that presents for above complaint(s).    Patient with history of rheumatoid arthritis previously seen by Rheumatology/Dr. Damian in May of 2023.  She continues on Plaquenil as previously prescribed and notes that her rheumatoid arthritis does not seem to be particularly active at this time.  She also carries a history of osteoarthritis of both knees and does note chronic knee pain that is aggravating especially with ambulation and other activities.  She avoids kneeling and other provocative positions.  She has had corticosteroid injections within the past year which she states were not effective at alleviating her knee pain.  No other medications tried except for meloxicam which was also ineffective.  Imaging was performed last year showing mostly mild destruction in the patellar region without other signs of tricompartmental arthritis.    She also is concerned about weight gain she is gained approximately 15-20 lb within the past year her physical activity has declined notably since she has not found the time to exercise/go to the gym  Dietary habits have also been poor      Answers submitted by the patient for this visit:  Review of Systems Questionnaire (Submitted on 3/12/2024)  activity change: No  unexpected weight change: Yes  neck pain: No  hearing loss: No  rhinorrhea: No  trouble swallowing: No  eye discharge: No  visual disturbance: No  chest tightness: No  wheezing: No  chest pain: No  palpitations: No  blood in stool: No  constipation: No  vomiting: No  diarrhea: No  polydipsia: No  polyuria: Yes  difficulty urinating: No  hematuria: No  menstrual problem: No  dysuria: No  arthralgias: No  headaches: No  weakness: No  confusion: No  dysphoric mood: No      Patient Care Team:  Geovanni Britton MD as PCP - General  (Internal Medicine)  Beth Whitney MA as Care Coordinator      PAST MEDICAL HISTORY:  Past Medical History:   Diagnosis Date    Allergy     Angio-edema     Eczema     History of pulmonary embolism 2005    Hypertension     Urticaria        PAST SURGICAL HISTORY:  Past Surgical History:   Procedure Laterality Date    FRACTURE SURGERY      Plate in right arm       VAGINAL DELIVERY         SOCIAL HISTORY:  Social History     Socioeconomic History    Marital status: Single   Tobacco Use    Smoking status: Never    Smokeless tobacco: Never   Substance and Sexual Activity    Alcohol use: Yes     Comment: Social     Drug use: No    Sexual activity: Yes     Partners: Male     Social Determinants of Health     Financial Resource Strain: Low Risk  (4/5/2021)    Overall Financial Resource Strain (CARDIA)     Difficulty of Paying Living Expenses: Not hard at all   Food Insecurity: No Food Insecurity (4/5/2021)    Hunger Vital Sign     Worried About Running Out of Food in the Last Year: Never true     Ran Out of Food in the Last Year: Never true   Transportation Needs: No Transportation Needs (4/5/2021)    PRAPARE - Transportation     Lack of Transportation (Medical): No     Lack of Transportation (Non-Medical): No   Physical Activity: Inactive (4/5/2021)    Exercise Vital Sign     Days of Exercise per Week: 0 days     Minutes of Exercise per Session: 0 min   Stress: Stress Concern Present (4/5/2021)    [place] [employer] - Occupational Stress Questionnaire     Feeling of Stress : To some extent   Social Connections: Unknown (4/5/2021)    Social Connection and Isolation Panel [NHANES]     Frequency of Communication with Friends and Family: More than three times a week     Frequency of Social Gatherings with Friends and Family: Once a week     Active Member of Clubs or Organizations: No     Attends Club or Organization Meetings: Never     Marital Status: Living with partner   Housing Stability: Unknown  (4/5/2021)    Housing Stability Vital Sign     Unable to Pay for Housing in the Last Year: No     Unstable Housing in the Last Year: No       FAMILY HISTORY:  Family History   Problem Relation Age of Onset    Cancer Maternal Grandmother     Allergic rhinitis Mother     Stroke Father     Breast cancer Neg Hx     Ovarian cancer Neg Hx        ALLERGIES AND MEDICATIONS: updated and reviewed.  Review of patient's allergies indicates:   Allergen Reactions    Motrin ib [ibuprofen] Swelling     Swollen lips and knot on her head     Lisinopril Other (See Comments)     Angioedema     Current Outpatient Medications   Medication Sig Dispense Refill    EScitalopram oxalate (LEXAPRO) 20 MG tablet TAKE 1 TABLET(20 MG) BY MOUTH EVERY EVENING 90 tablet 0    hydrocortisone 2.5 % ointment APPLY TOPICALLY TO FACE BID PRN UTD      hydrOXYchloroQUINE (PLAQUENIL) 200 mg tablet Take 1 tablet (200 mg total) by mouth 2 (two) times daily. 60 tablet 6    metoprolol succinate (TOPROL-XL) 100 MG 24 hr tablet TAKE 1 TABLET(100 MG) BY MOUTH EVERY DAY 90 tablet 3    celecoxib (CELEBREX) 200 MG capsule Take 1 capsule (200 mg total) by mouth 2 (two) times daily as needed for Pain. 60 capsule 0    cetirizine (ZYRTEC) 10 MG tablet Take 1 tablet (10 mg total) by mouth once daily. 30 tablet 2    doxycycline (VIBRAMYCIN) 100 MG Cap TK 1 C PO QD FOR ACNE      ergocalciferol (ERGOCALCIFEROL) 50,000 unit Cap Take 50,000 Units by mouth every 7 days.      irbesartan (AVAPRO) 150 MG tablet Take 1 tablet (150 mg total) by mouth every evening. 30 tablet 2    semaglutide, weight loss, (WEGOVY) 0.25 mg/0.5 mL PnIj Inject 0.25 mg into the skin every 7 days. 4 each 0     No current facility-administered medications for this visit.         Objective:       Physical Exam  Vitals:    03/12/24 1508   BP: 120/82   Pulse: 80   Temp: 98.7 °F (37.1 °C)   TempSrc: Oral   SpO2: 96%   Weight: 116.2 kg (256 lb 1 oz)   Height: 5' (1.524 m)    Body mass index is 50.01  kg/m².  Weight: 116.2 kg (256 lb 1 oz)   Height: 5' (152.4 cm)   Physical Exam  Vitals reviewed.   Constitutional:       General: She is not in acute distress.     Appearance: She is well-developed. She is obese.   HENT:      Head: Normocephalic and atraumatic.      Right Ear: External ear normal.      Left Ear: External ear normal.      Nose: Nose normal.      Mouth/Throat:      Pharynx: No oropharyngeal exudate.   Eyes:      General: Lids are normal.      Extraocular Movements: Extraocular movements intact.      Conjunctiva/sclera: Conjunctivae normal.      Pupils: Pupils are equal, round, and reactive to light.   Neck:      Thyroid: No thyromegaly.   Cardiovascular:      Rate and Rhythm: Normal rate.      Heart sounds: Normal heart sounds. No murmur heard.     No friction rub. No gallop.   Pulmonary:      Effort: Pulmonary effort is normal. No respiratory distress.      Breath sounds: Normal breath sounds. No wheezing.   Abdominal:      General: There is no distension.      Palpations: Abdomen is soft. There is no mass.      Tenderness: There is no abdominal tenderness. There is no guarding or rebound.      Hernia: No hernia is present.   Musculoskeletal:         General: Tenderness (Anterior patellar bilaterally) present. No deformity.      Cervical back: Normal range of motion and neck supple.      Right lower leg: No edema.      Left lower leg: No edema.   Lymphadenopathy:      Cervical: No cervical adenopathy.   Skin:     General: Skin is warm and dry.      Findings: No erythema or rash.      Comments: Hypopigmented patches face; erythematous macules and plaques of face and neck with secondary hypopigmentation   Neurological:      Mental Status: She is alert.      Cranial Nerves: No cranial nerve deficit.   Psychiatric:         Behavior: Behavior normal.             Assessment:     1. Routine adult health maintenance    2. Primary osteoarthritis of both knees    3. Essential hypertension    4. Class 3 obesity     5. Encounter for FIT (fecal immunochemical test) screening    6. Rheumatoid arthritis involving multiple sites, unspecified whether rheumatoid factor present    7. Mixed stress and urge urinary incontinence      Plan:     Asha Paige was seen today for routine physical. Separate E/M Code for acute conditions discussed during today's routine physical examination have been documented in the assessment and plan below.    Diagnoses and all orders for this visit:    Routine adult health maintenance  Discussed healthy diet, regular exercise, necessary labs, age appropriate cancer screening, and routine vaccinations.    -     Comprehensive Metabolic Panel; Future  -     Hemoglobin A1C; Future  -     Lipid Panel; Future    Primary osteoarthritis of both knees  -     celecoxib (CELEBREX) 200 MG capsule; Take 1 capsule (200 mg total) by mouth 2 (two) times daily as needed for Pain.  -     C-REACTIVE PROTEIN; Future  -     Sedimentation rate; Future    Essential hypertension  BP controlled presently - reviewed anti-hypertensive regimen - continue current therapy    Class 3 obesity  Discussed recent weight gain and possible contributing factors (e.g., increase in life stressors, diminished sleep, other metabolic factors) as well as risk for contributing to the development of chronic disease states. Amenable to trial of adjunctive weight loss pharmacotherapy   -     semaglutide, weight loss, (WEGOVY) 0.25 mg/0.5 mL PnIj; Inject 0.25 mg into the skin every 7 days.    Encounter for FIT (fecal immunochemical test) screening  Adults 45 to 75 years of age should be screened for colorectal cancer. Colonoscopy and fecal stool testing discussed with respect to risks and benefits/advantages and disadvantages of each screening method. Patient aware that it is recommended that a positive Cologuard screen or FIT test be clinically correlated and followed-up with a structural examination of the colon such asdiagnostic colonoscopy.  Patient consents to colon cancer screening method as follows  -     Fecal Immunochemical Test (iFOBT); Future    Rheumatoid arthritis involving multiple sites, unspecified whether rheumatoid factor present  Long term usage of plaquenil  4/2022 +CCP Ab, RF negative  Patient on Plaquenil presently prior history of positive CCP  Obtain routine labs will establish care with a new rheumatologist  Counseled regarding importance of her routine eye examination in the setting of high-risk medication  -     Ambulatory referral/consult to Rheumatology; Future    Mixed stress and urge urinary incontinence  Discussed etiology of patient's condition   We discussed evaluation by Urogynecology management may consist of pelvic floor therapy versus consideration of surgical or pharmacological interventions  -     Ambulatory referral/consult to Urogynecology; Future    Change in bowel habits   Discussed considerations including irritable bowel syndrome as exclusionary diagnosis patient has need of colon cancer assessment which we ordered  I advised her used IBgard AKA concentrated peppermint oil at this time    Psoriasis  Patient has been on topical corticosteroid per outside Dermatology - still with unsatisfying results of face and some evidence of secondary hyperpigmentation due to chronic corticosteroid usage therefore will switch to a steroid sparing topical agent a tacrolimus 0.1% ointment twice daily for 8 weeks    Health Maintenance reviewed, addressed as per orders    F/u in 4 months    I spent 50 minutes reviewing the patient's chart, talking to family/caregiver(s), coordinating care with other providers/specialists and time spent on documentation       1. The patient indicates understanding of these issues and agrees with the plan. Brief care plan is updated and reviewed with the patient as applicable.   2. The patient is given an After Visit Summary that lists all medications with directions, allergies, orders placed during  this encounter and follow-up instructions.   3. I have reviewed the patient's medical information including past medical, family, and social history sections including the medications and allergies.   4. We discussed the patient's current medications. I reconciled the patient's medication list and prepared and supplied needed refills.       Geovanni Britton MD  Internal Medicine-Pediatrics

## 2024-03-12 NOTE — PROGRESS NOTES
[employer] was given to patient on 3/12/2024 3:50 PM. Demonstration/intructions with read back given, understood.

## 2024-03-13 LAB
ALBUMIN SERPL BCP-MCNC: 3 G/DL (ref 3.5–5.2)
ALP SERPL-CCNC: 70 U/L (ref 55–135)
ALT SERPL W/O P-5'-P-CCNC: 14 U/L (ref 10–44)
ANION GAP SERPL CALC-SCNC: 5 MMOL/L (ref 8–16)
AST SERPL-CCNC: 20 U/L (ref 10–40)
BILIRUB SERPL-MCNC: 0.5 MG/DL (ref 0.1–1)
BUN SERPL-MCNC: 9 MG/DL (ref 6–20)
CALCIUM SERPL-MCNC: 9 MG/DL (ref 8.7–10.5)
CHLORIDE SERPL-SCNC: 105 MMOL/L (ref 95–110)
CHOLEST SERPL-MCNC: 135 MG/DL (ref 120–199)
CHOLEST/HDLC SERPL: 3.1 {RATIO} (ref 2–5)
CO2 SERPL-SCNC: 28 MMOL/L (ref 23–29)
CREAT SERPL-MCNC: 0.8 MG/DL (ref 0.5–1.4)
CRP SERPL-MCNC: 45 MG/L (ref 0–8.2)
EST. GFR  (NO RACE VARIABLE): >60 ML/MIN/1.73 M^2
ESTIMATED AVG GLUCOSE: 103 MG/DL (ref 68–131)
GLUCOSE SERPL-MCNC: 75 MG/DL (ref 70–110)
HBA1C MFR BLD: 5.2 % (ref 4–5.6)
HDLC SERPL-MCNC: 44 MG/DL (ref 40–75)
HDLC SERPL: 32.6 % (ref 20–50)
LDLC SERPL CALC-MCNC: 75.4 MG/DL (ref 63–159)
NONHDLC SERPL-MCNC: 91 MG/DL
POTASSIUM SERPL-SCNC: 4.3 MMOL/L (ref 3.5–5.1)
PROT SERPL-MCNC: 7.4 G/DL (ref 6–8.4)
SODIUM SERPL-SCNC: 138 MMOL/L (ref 136–145)
TRIGL SERPL-MCNC: 78 MG/DL (ref 30–150)

## 2024-03-18 ENCOUNTER — TELEPHONE (OUTPATIENT)
Dept: FAMILY MEDICINE | Facility: CLINIC | Age: 49
End: 2024-03-18
Payer: MEDICAID

## 2024-03-18 NOTE — TELEPHONE ENCOUNTER
----- Message from Geovanni Britton MD sent at 3/17/2024 10:01 PM CDT -----  Please call the patient regarding the following results:    Labs show some elevation in her inflammatory tests. Please have her call referrals to schedule Rheumatology follow-up (referral placed last encounter)    Other labs (A1c/diabetes screening, lipid/cholesterol check) are within acceptable limits.    Let me know if you have any questions or concerns.

## 2024-03-20 ENCOUNTER — PATIENT MESSAGE (OUTPATIENT)
Dept: FAMILY MEDICINE | Facility: CLINIC | Age: 49
End: 2024-03-20
Payer: MEDICAID

## 2024-03-20 DIAGNOSIS — M17.0 PRIMARY OSTEOARTHRITIS OF BOTH KNEES: ICD-10-CM

## 2024-03-21 RX ORDER — CELECOXIB 200 MG/1
200 CAPSULE ORAL 2 TIMES DAILY PRN
Qty: 60 CAPSULE | Refills: 0 | Status: SHIPPED | OUTPATIENT
Start: 2024-03-21 | End: 2024-04-20

## 2024-03-24 ENCOUNTER — PATIENT MESSAGE (OUTPATIENT)
Dept: FAMILY MEDICINE | Facility: CLINIC | Age: 49
End: 2024-03-24
Payer: MEDICAID

## 2024-03-29 ENCOUNTER — HOSPITAL ENCOUNTER (EMERGENCY)
Facility: HOSPITAL | Age: 49
Discharge: HOME | End: 2024-03-30
Attending: STUDENT IN AN ORGANIZED HEALTH CARE EDUCATION/TRAINING PROGRAM
Payer: MEDICAID

## 2024-03-29 DIAGNOSIS — I10 HYPERTENSION, UNSPECIFIED TYPE: ICD-10-CM

## 2024-03-29 DIAGNOSIS — F43.0 ACUTE STRESS REACTION: Primary | ICD-10-CM

## 2024-03-29 DIAGNOSIS — H00.11 CHALAZION OF RIGHT UPPER EYELID: ICD-10-CM

## 2024-03-29 PROCEDURE — 99283 EMERGENCY DEPT VISIT LOW MDM: CPT

## 2024-03-29 PROCEDURE — 25000003 PHARM REV CODE 250: Performed by: STUDENT IN AN ORGANIZED HEALTH CARE EDUCATION/TRAINING PROGRAM

## 2024-03-29 RX ORDER — METOPROLOL SUCCINATE 50 MG/1
100 TABLET, EXTENDED RELEASE ORAL
Status: COMPLETED | OUTPATIENT
Start: 2024-03-29 | End: 2024-03-29

## 2024-03-29 RX ORDER — HYDROXYZINE PAMOATE 25 MG/1
50 CAPSULE ORAL
Status: COMPLETED | OUTPATIENT
Start: 2024-03-29 | End: 2024-03-29

## 2024-03-29 RX ADMIN — METOPROLOL SUCCINATE 100 MG: 50 TABLET, EXTENDED RELEASE ORAL at 11:03

## 2024-03-29 RX ADMIN — HYDROXYZINE PAMOATE 50 MG: 25 CAPSULE ORAL at 10:03

## 2024-03-30 VITALS
BODY MASS INDEX: 50.4 KG/M2 | HEART RATE: 92 BPM | SYSTOLIC BLOOD PRESSURE: 181 MMHG | TEMPERATURE: 98 F | DIASTOLIC BLOOD PRESSURE: 77 MMHG | WEIGHT: 250 LBS | HEIGHT: 59 IN | OXYGEN SATURATION: 100 % | RESPIRATION RATE: 16 BRPM

## 2024-03-30 RX ORDER — HYDROXYZINE HYDROCHLORIDE 25 MG/1
50 TABLET, FILM COATED ORAL 3 TIMES DAILY PRN
Qty: 12 TABLET | Refills: 0 | Status: SHIPPED | OUTPATIENT
Start: 2024-03-30

## 2024-03-30 NOTE — ED PROVIDER NOTES
Encounter Date: 3/29/2024       History     Chief Complaint   Patient presents with    Allergic Reaction    Stress     Pt presents to ER with complaints of an allergic reaction and stress. Pt states she was arguing and then she felt her face and lips swelling. Pt admits she takes HTN medications but didn't today. Pt denies any issues at this time.      48 y.o. female who has a past medical history of Allergy, Angio-edema, Eczema, History of pulmonary embolism, Hypertension, and Urticaria presents to emergency department due to transient episode of facial swelling.  Patient states she got into a verbal altercation and got significantly stressed and subsequently felt like her right eye and lip were swollen.  She reports due to incident she used her nighttime blood pressure medications.  She was concerned she may be having a stroke allergic reaction so came to the emergency department for evaluation.  She reports taking Zyrtec prior to ED arrival.  Currently denies any difficulty breathing, sore throat, chest pain or shortness of breath.  Denies any nausea or vomiting.  Denies any numbness or weakness.    The history is provided by the patient.     Review of patient's allergies indicates:   Allergen Reactions    Motrin ib [ibuprofen] Swelling     Swollen lips and knot on her head     Lisinopril Other (See Comments)     Angioedema     Past Medical History:   Diagnosis Date    Allergy     Angio-edema     Eczema     History of pulmonary embolism 2005    Hypertension     Urticaria      Past Surgical History:   Procedure Laterality Date    FRACTURE SURGERY      Plate in right arm       VAGINAL DELIVERY       Family History   Problem Relation Age of Onset    Cancer Maternal Grandmother     Allergic rhinitis Mother     Stroke Father     Breast cancer Neg Hx     Ovarian cancer Neg Hx      Social History     Tobacco Use    Smoking status: Never    Smokeless tobacco: Never   Substance Use Topics    Alcohol use: Yes     Comment:  Social     Drug use: No     Review of Systems   Constitutional:  Negative for chills and fever.   HENT:  Positive for facial swelling. Negative for congestion and rhinorrhea.    Eyes:  Negative for pain.   Respiratory:  Negative for cough and shortness of breath.    Cardiovascular:  Negative for chest pain and leg swelling.   Gastrointestinal:  Negative for abdominal pain, nausea and vomiting.   Genitourinary:  Negative for dysuria and hematuria.   Musculoskeletal:  Negative for joint swelling and neck pain.   Skin:  Negative for rash.   Neurological:  Negative for weakness and headaches.       Physical Exam     Initial Vitals [03/29/24 2129]   BP Pulse Resp Temp SpO2   (!) 195/116 98 20 98.3 °F (36.8 °C) 98 %      MAP       --         Physical Exam    Nursing note and vitals reviewed.  Constitutional: She is not diaphoretic. No distress.   HENT:   Head: Normocephalic and atraumatic.   Eyes: Conjunctivae and EOM are normal. Pupils are equal, round, and reactive to light.       Neck:   Normal range of motion.  Pulmonary/Chest: Breath sounds normal. No respiratory distress.   Abdominal: Abdomen is soft. Bowel sounds are normal. She exhibits no distension. There is no abdominal tenderness.   Musculoskeletal:         General: No tenderness. Normal range of motion.      Cervical back: Normal range of motion.     Neurological: She is alert and oriented to person, place, and time.   Moves all extremities and carries on conversation. CN- II: PERRL; III/IV/VI: EOMI w/out evidence of nystagmus; V: no deficits appreciated to light touch bilateral face; VII: no facial weakness, no facial asymmetry. Eyebrow raise symmetric. Smile symmetric; IX/X: palate midline, and raises symmetrically; XI: shoulder shrug 5/5 bilaterally; XII: tongue is midline w/out asymmetry. Strength 5/5 to bilateral upper and lower extremities, sensation intact to light touch,   Skin: Skin is warm. Capillary refill takes less than 2 seconds.   Psychiatric:  Her behavior is normal.         ED Course   Procedures  Labs Reviewed - No data to display         Imaging Results    None          Medications   hydrOXYzine pamoate capsule 50 mg (50 mg Oral Given 3/29/24 8505)   metoprolol succinate (TOPROL-XL) 24 hr tablet 100 mg (100 mg Oral Given 3/29/24 2320)     Medical Decision Making:   Initial Assessment:   48 y.o. female who has a past medical history of Allergy, Angio-edema, Eczema, History of pulmonary embolism, Hypertension, and Urticaria presents to emergency department due to transient episode of facial swelling.  Patient in no acute distress, exam without any signs of angioedema.  Lungs clear to auscultation.  No focal neurological deficits.  Blood pressure elevated likely secondary to acute stressor and missing her home antihypertensive medications.  Currently patient is asymptomatic.  No indication for lab work or imaging low suspicion for stroke at this time or TIA.  Will give patient home metoprolol and hydroxyzine and reassess.  Differential Diagnosis:   Differential Diagnosis includes, but is not limited to:  Hypertensive encephalopathy, CVA/TIA, intracranial hemorrhage, MI/ACS, aortic/carotid artery dissection, AAA, hypertensive nephropathy, medication non-compliance, anxiety, drug abuse/intoxication, essential hypertension              ED Course as of 03/30/24 1724   Sat Mar 30, 2024   0017 Pt is currently stable for discharge. I see no indication of an emergent process beyond that addressed during our encounter but have duly counseled the patient/family regarding the need for prompt follow-up as well as the indications that should prompt immediate return to the emergency room should new or worrisome developments occur. I discussed the ED work up and diagnostic findings with the patient/family. The patient/family has been provided with verbal and printed direction regarding our final diagnosis(es) as well as instructions regarding use of OTC and/or Rx  medications intended to manage the patient's aforementioned conditions. The patient/family verbalized an understanding. The patient/family is asked if there are any questions or concerns. We discuss the case, until all issues are addressed to the patient/family's satisfaction. Patient/family understands and is agreeable to the plan.  [AS]      ED Course User Index  [AS] Ruchi Padilla MD          Medical Decision Making  Amount and/or Complexity of Data Reviewed  Labs: ordered.    Risk  Prescription drug management.           Clinical Impression:   Final diagnoses:  [F43.0] Acute stress reaction (Primary)  [I10] Hypertension, unspecified type  [H00.11] Chalazion of right upper eyelid          ED Disposition Condition    Discharge Stable          ED Prescriptions       Medication Sig Dispense Start Date End Date Auth. Provider    hydrOXYzine HCL (ATARAX) 25 MG tablet Take 2 tablets (50 mg total) by mouth 3 (three) times daily as needed for Anxiety. 12 tablet 3/30/2024 -- Ruchi Padilla MD          Follow-up Information       Follow up With Specialties Details Why Contact Info    Geovanni Britton MD Internal Medicine, Pediatrics Schedule an appointment as soon as possible for a visit  for reassesment [address] [facility] [address]  [phone]      [facility] Emergency Dept Emergency Medicine  If symptoms worsen [address] [facility] [place] [address] [phone]            DISCLAIMER: This note was prepared with [employer] voice recognition transcription software. Garbled syntax, mangled pronouns, and other bizarre constructions may be attributed to that software system.     Ruchi Padilla MD  03/30/24 3023

## 2024-03-30 NOTE — DISCHARGE INSTRUCTIONS
Thank you for coming to our Emergency Department today. It is important to remember that some problems are difficult to diagnose and may not be found during your first visit. Be sure to follow up with your primary care doctor and review any labs/imaging that was performed with them. If you do not have a primary care doctor, you may contact the one listed on your discharge paperwork or you may also call the [facility] Appointment Desk at [phone] to schedule an appointment with one.     All medications may potentially have side effects and it is impossible to predict which medications may give you side effects. If you feel that you are having a negative effect of any medication you should immediately stop taking them and seek medical attention.    Return to the ER with any questions/concerns, new/concerning symptoms, worsening or failure to improve. Do not drive or make any important decisions for 24 hours if you have received any pain medications, sedatives or mood altering drugs during your ER visit.

## 2024-04-22 ENCOUNTER — TELEPHONE (OUTPATIENT)
Dept: FAMILY MEDICINE | Facility: CLINIC | Age: 49
End: 2024-04-22
Payer: MEDICAID

## 2024-04-22 NOTE — TELEPHONE ENCOUNTER
----- Message from Jovitabipin Snodwen sent at 4/22/2024 12:23 PM CDT -----  Regarding: Self [phone]  Type: Patient Call Back     Who called: Self     What is the request in detail: Pt states she contacted the pharmacy to refill her RX for metoprolol succinate (TOPROL-XL) 100 MG 24 hr tablet. She was told by the pharmacy that she is out of refills. The provider will need to call and give the approve for the meds to be filled. Pt states she has been out of meds since Friday.     Can the clinic reply by [facility]? Yes     Would the patient rather a call back or a response via [facility]? Either     Best call back number: [phone]       Additional Information:

## 2024-04-24 ENCOUNTER — CLINICAL SUPPORT (OUTPATIENT)
Dept: FAMILY MEDICINE | Facility: CLINIC | Age: 49
End: 2024-04-24
Payer: MEDICAID

## 2024-04-24 VITALS — SYSTOLIC BLOOD PRESSURE: 138 MMHG | HEART RATE: 90 BPM | DIASTOLIC BLOOD PRESSURE: 88 MMHG

## 2024-04-24 DIAGNOSIS — I10 ESSENTIAL HYPERTENSION: ICD-10-CM

## 2024-04-24 DIAGNOSIS — I10 ESSENTIAL HYPERTENSION: Primary | ICD-10-CM

## 2024-04-24 PROCEDURE — 99999 PR PBB SHADOW E&M-EST. PATIENT-LVL I: CPT | Mod: PBBFAC,,,

## 2024-04-24 PROCEDURE — 99211 OFF/OP EST MAY X REQ PHY/QHP: CPT | Mod: PBBFAC,PO

## 2024-04-24 RX ORDER — METOPROLOL SUCCINATE 100 MG/1
100 TABLET, EXTENDED RELEASE ORAL DAILY
Qty: 90 TABLET | Refills: 3 | Status: SHIPPED | OUTPATIENT
Start: 2024-04-24

## 2024-04-24 NOTE — PROGRESS NOTES
Asha Paige 48 y.o. female is here today for Blood Pressure check.   History of HTN yes.    Review of patient's allergies indicates:   Allergen Reactions    Motrin ib [ibuprofen] Swelling     Swollen lips and knot on her head     Lisinopril Other (See Comments)     Angioedema     Creatinine   Date Value Ref Range Status   03/12/2024 0.8 0.5 - 1.4 mg/dL Final     Sodium   Date Value Ref Range Status   03/12/2024 138 136 - 145 mmol/L Final     Potassium   Date Value Ref Range Status   03/12/2024 4.3 3.5 - 5.1 mmol/L Final   ]  Patient verifies taking blood pressure medications on a regular basis at the same time of the day.     Current Outpatient Medications:     cetirizine (ZYRTEC) 10 MG tablet, Take 1 tablet (10 mg total) by mouth once daily., Disp: 30 tablet, Rfl: 2    EScitalopram oxalate (LEXAPRO) 20 MG tablet, TAKE 1 TABLET(20 MG) BY MOUTH EVERY EVENING, Disp: 90 tablet, Rfl: 0    hydrOXYchloroQUINE (PLAQUENIL) 200 mg tablet, Take 1 tablet (200 mg total) by mouth 2 (two) times daily., Disp: 60 tablet, Rfl: 6    hydrOXYzine HCL (ATARAX) 25 MG tablet, Take 2 tablets (50 mg total) by mouth 3 (three) times daily as needed for Anxiety., Disp: 12 tablet, Rfl: 0    irbesartan (AVAPRO) 150 MG tablet, Take 1 tablet (150 mg total) by mouth every evening., Disp: 30 tablet, Rfl: 2    metoprolol succinate (TOPROL-XL) 100 MG 24 hr tablet, TAKE 1 TABLET(100 MG) BY MOUTH EVERY DAY, Disp: 90 tablet, Rfl: 3    tacrolimus (PROTOPIC) 0.1 % ointment, Apply topically 2 (two) times daily. Apply to affected areas of face/neck, Disp: 60 g, Rfl: 0  Does patient have record of home blood pressure readings no.   Last dose of blood pressure medication was taken at 9:30 PM.  Patient is asymptomatic.     BP: (!) 144/90 , Pulse: 90 .    Blood pressure reading after 15 minutes was 138/88.  Dr. Britton notified.

## 2024-05-06 ENCOUNTER — PATIENT MESSAGE (OUTPATIENT)
Dept: ADMINISTRATIVE | Facility: HOSPITAL | Age: 49
End: 2024-05-06
Payer: MEDICAID

## 2024-05-19 NOTE — PROGRESS NOTES
[facility] - UROGYNECOLOGY  [address] [address]    Asha Paige  [id number]  1975  May 20, 2024    Consulting Physician: Geovanni Britton MD   GYN: MD Deepti  Primary M.D.: Geovanni Britton MD    Chief Complaint   Patient presents with    Urinary Incontinence     Referred by Dr. Britton    Urinary Frequency    Urinary Urgency       HPI:     1)  UI:  (+) CARMINE < (+) UUI  X 1 year.  (--) pads.  Changes underwear if doesn't make it on time, 2x/day--damp. Is limiting fluid intake.   Daytime frequency: Q 1 hours.  Nocturia: Yes: 3/night.   (--) dysuria,  (--) hematuria,  (--) frequent UTIs.  (+) complete bladder emptying.   --no previous Tx    2)  POP:  Absent. (--) vaginal bleeding. (--) vaginal discharge. (--) sexually active.  (--) h/o dyspareunia.  (--)  Vaginal dryness.  (--) vaginal estrogen use.     3)  BM:  (--) constipation/straining.  (--) chronic diarrhea. Consistency is pasty. (--) hematochezia.  (--) fecal incontinence.  (+) fecal smearing/urgency.  (--) complete evacuation.  Has to wipe a lot to clean.     Past Medical History  Past Medical History:   Diagnosis Date    Allergy     Angio-edema     Eczema     History of pulmonary embolism     Hypertension     Urticaria    --Eczema: plaquenil  --h/o PE PP  : was on injections x several months PP  --h/o PP cardiomyopathy: did follow up with cards PP--states resolved per ECHO--no further follow up  --depression/anxiety: lexapro    Past Surgical History  Past Surgical History:   Procedure Laterality Date    FRACTURE SURGERY      Plate in right arm       VAGINAL DELIVERY        Hysterectomy: No    Past Ob History     x 2.  C/s x 0.    Largest infant weight: 6#  no FAVD. no episiotomy.      Gynecologic History  LMP: No LMP recorded. Patient has had an implant.  Age of menarche: 12 yo  Age of menopause: amenorrhea with IUD; h/o prolonged bleeding with IUD  Menstrual history:amenorrhea with IUD  Pap test:   normal/HPV NEG.  History of abnormal paps: No.  History of STIs:  No  Mammogram: Date of last: 2020.  Result: Bx + US done--intraductal papilloma/benign. Ordered--needs to schedule.  Colonoscopy: none  DEXA:  none    Family History  Family History   Problem Relation Name Age of Onset    Cancer Maternal Grandmother      Allergic rhinitis Mother      Stroke Father      Breast cancer Neg Hx      Ovarian cancer Neg Hx     Unknown CA: MGM, PGM   Colon CA: No  Breast CA: No  GYN CA: No   CA: No    Social History  Social History     Tobacco Use   Smoking Status Never   Smokeless Tobacco Never     Social History     Substance and Sexual Activity   Alcohol Use Yes    Comment: Social    .    Social History     Substance and Sexual Activity   Drug Use No     The patient is single.  Resides in [place] [address].  Employment status: currently employed at [employer], .      Allergies  Review of patient's allergies indicates:   Allergen Reactions    Motrin ib [ibuprofen] Swelling     Swollen lips and knot on her head     Lisinopril Other (See Comments)     Angioedema       Medications  Current Outpatient Medications on File Prior to Visit   Medication Sig Dispense Refill    EScitalopram oxalate (LEXAPRO) 20 MG tablet TAKE 1 TABLET(20 MG) BY MOUTH EVERY EVENING 90 tablet 0    hydrOXYchloroQUINE (PLAQUENIL) 200 mg tablet Take 1 tablet (200 mg total) by mouth 2 (two) times daily. 60 tablet 6    hydrOXYzine HCL (ATARAX) 25 MG tablet Take 2 tablets (50 mg total) by mouth 3 (three) times daily as needed for Anxiety. 12 tablet 0    metoprolol succinate (TOPROL-XL) 100 MG 24 hr tablet Take 1 tablet (100 mg total) by mouth once daily. 90 tablet 3    cetirizine (ZYRTEC) 10 MG tablet Take 1 tablet (10 mg total) by mouth once daily. 30 tablet 2    irbesartan (AVAPRO) 150 MG tablet Take 1 tablet (150 mg total) by mouth every evening. 30 tablet 2     No current facility-administered medications on file prior to visit.  "      Review of Systems A 14 point ROS was reviewed with pertinent positives as noted above in the history of present illness.      Constitutional: negative  Eyes: negative  Endocrine: negative  Gastrointestinal: negative  Cardiovascular: negative  Respiratory: negative  Allergic/Immunologic: negative  Integumentary: negative  Psychiatric: negative  Musculoskeletal: negative   Ear/Nose/Throat: negative  Neurologic: negative  Genitourinary: SEE HPI  Hematologic/Lymphatic: negative   Breast: negative    Urogynecologic Exam  BP (!) 193/90   Ht 4' 11" (1.499 m)   Wt 118.3 kg (260 lb 12.9 oz)   BMI 52.68 kg/m²     GENERAL APPEARANCE:  The patient is well-developed, well-nourished.   Neck:  Supple with no thyromegaly, no carotid bruits.  Heart:  Regular rate and rhythm, no murmurs, rubs or gallops.  Lungs:  Clear.  No CVA tenderness.  Abdomen:  Soft, nontender, nondistended, no hepatosplenomegaly.  Incisions:  none    PELVIC:    External genitalia:  Normal Bartholins, Skenes and labia bilaterally.    Urethra:  No caruncle, diverticulum or masses.  (+) hypermobility.    Vagina:  Atrophy (--) , no bladder masses or tender, no discharge.    Cervix:  normal appearance.  Anterior lip elongated x 2-3 cm.  Posterior lip at -3, anterior lip at -0.5.   Uterus: normal size, contour, position, consistency, mobility, non-tender, exam inhibited.   Adnexa: Not palpable.    POP-Q:  Aa -2; Ba -2; C -3 (posterior), -0.5 anterior; Ap -1; Bp -1; D -6.  Genital hiatus 3, perineal body 3, total vaginal length 10-11.    NEUROLOGIC:  Cranial nerves 2 through 12 intact.  Strength 5/5.  DTRs 2+ lower extremities.  S2 through 4 normal.  Sacral reflexes intact.    EXT: HUDSON, 2+ pulses bilaterally, no C/C/E    COUGH STRESS TEST:  negative  KEGEL: 1 /5    RECTAL:    External:  Normal, (--) hemorrhoids, (--) dovetailing.   Internal: deferred    PVR: 20 mL    Impression    1. Colon cancer screening    2. Mixed stress and urge urinary incontinence  "   3. Fecal smearing    4. Elongation, cervix, hypertrophic        Initial Plan  The patient was counseled regarding these issues. The patient was given a summary sheet containing each of these issues with possible options for evaluation and management. When appropriate, we also reviewed computer-generated diagrams specific to their diagnoses..  All questions were addressed to the patient's satisfaction.    1)  Well-woman:  --please schedule mammogram  --please call endoscopy to schedule screening colonoscopy:  Endoscopy Scheduling Department [phone] between the hours of 7 am and 5 pm     2)  Mixed urinary incontinence, urge > stress:    --urine C&S  --Empty bladder every 3 hours.  Empty well: wait a minute, lean forward on toilet.    --Avoid dietary irritants (see sheet).  Keep diary x 3-5 days to determine your irritants.  --start pelvic floor PT.  Call to make appt:    Alize Walton ([facility] [place] [facility]: [address] [place] [address]).  (P) [phone] FAX [phone].  We accept the same insurances as [employer] and [employer] with the exception of:  · [employer] specific plans and [facility] HMO  · [facility] specific plans  · [facility] HMO  · [employer]  · [facility] Medicare Advantage  · [facility] Medicare Complete HMO  --Takes Medicaid.     --URGE: Start sanctura 60 xl.  Takes 2-4 weeks to see if will have effect.  For dry mouth: get sour, sugar free lozenge or gum.     --if too expensive, let us know which of the following is covered (call insurance):  Anticholinergics: sanctura (short-acting), sanctura (long-acting), vesicare, enablex, toviaz, detrol.    Beta agonists: mirabegron, vibegron    --STRESS:  Pessary vs. Sling.     3)  Fecal smearing:  --hydrate well  --avoid dietary triggers  --work on stool consistency.  Start daily fiber.  Take 1 tsp of fiber powder (psyllium or other sugar-free powder).  Mix in 8 oz of water.  Take x 3-5 days.  Then,  increase fiber by 1 tsp every 3-5 days until stool is easy to pass, well-formed, less smearing.  Stop and continue at that dose.   Do not exceed 6 tsps/day.  May also use over the counter stool softener 1-2 x/day.  AVOID laxatives.    4)  Cervical elongation:  --of anterior lip  --discussed  --no other major prolapse  --CTM    5)  RTC 3-4 months.     Approximately 45 min were spent in consult, 90 % in discussion.     Thank you for requesting consultation of your patient.  I look forward to participating in their care.    Osmany Segovia  Female Pelvic Medicine and Reconstructive Surgery  [facility] [place], [place]

## 2024-05-20 ENCOUNTER — OFFICE VISIT (OUTPATIENT)
Dept: UROGYNECOLOGY | Facility: CLINIC | Age: 49
End: 2024-05-20
Payer: MEDICAID

## 2024-05-20 VITALS
SYSTOLIC BLOOD PRESSURE: 193 MMHG | HEIGHT: 59 IN | BODY MASS INDEX: 52.58 KG/M2 | WEIGHT: 260.81 LBS | DIASTOLIC BLOOD PRESSURE: 90 MMHG

## 2024-05-20 DIAGNOSIS — R15.1 FECAL SMEARING: ICD-10-CM

## 2024-05-20 DIAGNOSIS — N88.4: ICD-10-CM

## 2024-05-20 DIAGNOSIS — Z12.11 COLON CANCER SCREENING: Primary | ICD-10-CM

## 2024-05-20 DIAGNOSIS — N39.46 MIXED STRESS AND URGE URINARY INCONTINENCE: ICD-10-CM

## 2024-05-20 PROCEDURE — 99999 PR PBB SHADOW E&M-EST. PATIENT-LVL V: CPT | Mod: PBBFAC,,, | Performed by: OBSTETRICS & GYNECOLOGY

## 2024-05-20 PROCEDURE — 3080F DIAST BP >= 90 MM HG: CPT | Mod: CPTII,,, | Performed by: OBSTETRICS & GYNECOLOGY

## 2024-05-20 PROCEDURE — 51701 INSERT BLADDER CATHETER: CPT | Mod: PBBFAC | Performed by: OBSTETRICS & GYNECOLOGY

## 2024-05-20 PROCEDURE — 87086 URINE CULTURE/COLONY COUNT: CPT | Performed by: OBSTETRICS & GYNECOLOGY

## 2024-05-20 PROCEDURE — 1159F MED LIST DOCD IN RCRD: CPT | Mod: CPTII,,, | Performed by: OBSTETRICS & GYNECOLOGY

## 2024-05-20 PROCEDURE — 51701 INSERT BLADDER CATHETER: CPT | Mod: S$PBB,,, | Performed by: OBSTETRICS & GYNECOLOGY

## 2024-05-20 PROCEDURE — 3077F SYST BP >= 140 MM HG: CPT | Mod: CPTII,,, | Performed by: OBSTETRICS & GYNECOLOGY

## 2024-05-20 PROCEDURE — 1160F RVW MEDS BY RX/DR IN RCRD: CPT | Mod: CPTII,,, | Performed by: OBSTETRICS & GYNECOLOGY

## 2024-05-20 PROCEDURE — 99215 OFFICE O/P EST HI 40 MIN: CPT | Mod: S$PBB,25,, | Performed by: OBSTETRICS & GYNECOLOGY

## 2024-05-20 PROCEDURE — 99215 OFFICE O/P EST HI 40 MIN: CPT | Mod: PBBFAC,25 | Performed by: OBSTETRICS & GYNECOLOGY

## 2024-05-20 PROCEDURE — 3044F HG A1C LEVEL LT 7.0%: CPT | Mod: CPTII,,, | Performed by: OBSTETRICS & GYNECOLOGY

## 2024-05-20 PROCEDURE — 3008F BODY MASS INDEX DOCD: CPT | Mod: CPTII,,, | Performed by: OBSTETRICS & GYNECOLOGY

## 2024-05-20 RX ORDER — TROSPIUM CHLORIDE ER 60 MG/1
60 CAPSULE ORAL DAILY
Qty: 30 CAPSULE | Refills: 11 | Status: SHIPPED | OUTPATIENT
Start: 2024-05-20 | End: 2025-05-20

## 2024-05-20 NOTE — PATIENT INSTRUCTIONS
Bladder Irritants  Certain foods and drinks have been associated with worsening symptoms of urinary frequency, urgency, urge incontinence, or bladder pain. If you suffer from any of these conditions, you may wish to try eliminating one or more of these foods from your diet and see if your symptoms improve. If bladder symptoms are related to dietary factors, strict adherence to a diet thateliminates the food should bring marked relief in 10 days. Once you are feeling better, you can begin to add foods back into your diet, one at a time. If symptoms return, you will be able to identify the irritant. As you add foods back to your diet it is very important that you drink significant amounts of water.    -----------------------------------------------------------------------------------------------  List of Common Bladder Irritants*  Alcoholic beverages  Apples and apple juice  Cantaloupe  Carbonated beverages  Chili and spicy foods  Chocolate  Citrus fruit  Coffee (including decaffeinated)  Cranberries and cranberry juice  Grapes  Guava  Milk Products: milk, cheese, cottage cheese, yogurt, ice cream  Peaches  Pineapple  Plums  Strawberries  Sugar especially artificial sweeteners, saccharin, aspartame, corn sweeteners, honey, fructose, sucrose, lactose  Tea  Tomatoes and tomato juice  Vitamin B complex  Vinegar  *Most people are not sensitive to ALL of these products; your goal is to find the foods that make YOUR symptoms worse.  ---------------------------------------------------------------------------------------------------    Low-acid fruit substitutions include apricots, papaya, pears and watermelon. Coffee drinkers can drink Kava or other lowacid instant drinks. Tea drinkers can substitute non-citrus herbal and sun brewed teas. Calcium carbonate co-buffered with calcium ascorbate can be substituted for Vitamin C. Prelief is a dietary supplement that works as an acid blocker for the bladder.    Where to get more  information:        Overcoming Bladder Disorders by Gema Wood and Reza Meng, 1990        You Dont Have to Live with Cystitis! By Leonora Toure, 1988  http://[email or web address].org/oab  ------------------------------------------------------    Fiber Information Sheet  Your doctor has recommended that you follow a high fiber diet. The addition of fiber to your diet can make an enormous difference in your bowel control and regularity. Fiber helps people whether they lose stool or have trouble with constipation. Fiber works by bulking the stool and keeping it formed, yet making the movement soft and easy to pass. Fiber helps keep moisture within the stool so that neither diarrhea nor hard stool occurs. Fiber makes the bowels work more regularly, but it is not a laxative. An additional bonus from eating a high fiber diet is that your risk of cancer is reduced, too.    Most of us eat some high fiber foods already, but nearly all of us do not eat the necessary amount. For example, a slice of whole wheat bread contains only about 10% of the daily recommended amount of fiber. This means if you are relying on only whole wheat bread to meet the recommended fiber requirements, you would need to eat  between 10-18 slices every day! Please note that fiber is NOT in any meat or dairy product. It is only found in grains, vegetables and fruits. The recommended daily fiber intake is 20-25 grams. Foods having high fiber content include:     Fiber One Cereal, ½ cup 13.0 g   Pandey beans, ¾ cup 10.4 g   Wheat bran cereal, 1 oz 10.0 g   Kidney beans, ¾ cup 9.3 g   All Bran Cereal, ½ cup 6.0 g   Oat Bran Cereal, hot, 1 oz 4.0 g   Banana, 1medium 3.8 g   Canned pears, ½ cup 3.7 g   3 prunes or ¼ cup raisins 3.5 g   Whole Wheat Total, 1 cup 3.0 g   Carrots, ½ cup 3.2 g   Apple, small 2.8 g   Broccoli, ½ cup 2.8 g   Cauliflower, ½ cup 2.6 g   Oatmeal, 1 oz 2.5 g   Whole Wheat Toast 2.0 g    Cheerios, 1 1/3 cup 2.0 g   Baked potato with skin 2.0 g   Corn, ½ cup 1.9 g   Popcorn, 3 cups 1.9 g   Orange, medium 1.9 g   Granola bar 1.0 g   Lettuce, ½ cup 0.9 g    If you dont think that you can get enough fiber through your everyday diet, there are many good fiber supplements you can take along with eating your high fiber diet. Some of these are: Metamucil (1 heaping teaspoon or 1-2 wafers), Citrucel (1 tablespoon), Fiberall (1-2 wafers or 1 teaspoon), Perdium (2 rounded teaspoons) and 1-2 teaspoons unprocessed bran (to mix with foods)    You may need to use the fiber supplement up to 3-4 times daily to produce normal elimination. Please follow specific package directions or call us for help in regulating the dose. You may notice some bloating and/or increased gas at first. These symptoms can be relieved by adding fiber to your diet slowly. Once your body gets used to this increased fiber, these symptoms will go away.   ----------------------------------------------    1)  Well-woman:  --please schedule mammogram  --please call endoscopy to schedule screening colonoscopy:  Endoscopy Scheduling Department [phone] between the hours of 7 am and 5 pm     2)  Mixed urinary incontinence, urge > stress:    --urine C&S  --Empty bladder every 3 hours.  Empty well: wait a minute, lean forward on toilet.    --Avoid dietary irritants (see sheet).  Keep diary x 3-5 days to determine your irritants.  --start pelvic floor PT.  Call to make appt:    Alize Walton ([facility] [address] [place] [id number]).  (P) [phone] FAX [phone].  We accept the same insurances as [facility] with the exception of:  · [facility] and [facility]  · [facility] specific plans  · [place] [facility] HMO  · [employer]  · [facility] Medicare Advantage  · [facility] Medicare Complete HMO  --Takes Medicaid.     --URGE: Start sanctura 60 xl.  Takes 2-4  weeks to see if will have effect.  For dry mouth: get sour, sugar free lozenge or gum.     --if too expensive, let us know which of the following is covered (call insurance):  Anticholinergics: sanctura (short-acting), sanctura (long-acting), vesicare, enablex, toviaz, detrol.    Beta agonists: mirabegron, vibegron    --STRESS:  Pessary vs. Sling.     3)  Fecal smearing:  --hydrate well  --avoid dietary triggers  --work on stool consistency.  Start daily fiber.  Take 1 tsp of fiber powder (psyllium or other sugar-free powder).  Mix in 8 oz of water.  Take x 3-5 days.  Then, increase fiber by 1 tsp every 3-5 days until stool is easy to pass, well-formed, less smearing.  Stop and continue at that dose.   Do not exceed 6 tsps/day.  May also use over the counter stool softener 1-2 x/day.  AVOID laxatives.    4)  Cervical elongation:  --of anterior lip  --discussed  --no other major prolapse  --CTM    5)  RTC 3-4 months.

## 2024-05-21 LAB — BACTERIA UR CULT: NO GROWTH

## 2024-05-27 ENCOUNTER — PATIENT MESSAGE (OUTPATIENT)
Dept: UROGYNECOLOGY | Facility: CLINIC | Age: 49
End: 2024-05-27
Payer: MEDICAID

## 2024-05-28 ENCOUNTER — TELEPHONE (OUTPATIENT)
Dept: UROGYNECOLOGY | Facility: CLINIC | Age: 49
End: 2024-05-28
Payer: MEDICAID

## 2024-05-28 ENCOUNTER — PATIENT MESSAGE (OUTPATIENT)
Dept: FAMILY MEDICINE | Facility: CLINIC | Age: 49
End: 2024-05-28
Payer: MEDICAID

## 2024-05-28 DIAGNOSIS — M06.9 RHEUMATOID ARTHRITIS, INVOLVING UNSPECIFIED SITE, UNSPECIFIED WHETHER RHEUMATOID FACTOR PRESENT: ICD-10-CM

## 2024-05-28 NOTE — TELEPHONE ENCOUNTER
----- Message from Osmany Segovia MD sent at 5/27/2024  7:03 PM CDT -----  Regarding: RE: Pa  Can we find out what meds would be covered by her insurance? She would need to call her insurance or y'all would have to.  These are the other options:    Anticholinergics:  sanctura (short-acting), sanctura (long-acting), vesicare, enablex, toviaz, detrol.    Beta agonists: mirabegron, vibegron  TCA: imipramine    Just let me know what y'all find out.  Thanks  ----- Message -----  From: Anjelica Cristina MA  Sent: 5/27/2024   2:16 PM CDT  To: Osmany Segovia MD  Subject: Pa                                               We have to do a PA on this medication sanctura 60 xl.  The PA is asking if the patient can take something different.  Can you please advise?

## 2024-05-28 NOTE — TELEPHONE ENCOUNTER
No care due was identified.  [facility] Embedded Care Due Messages. Reference number: [id number].   5/28/2024 1:41:28 PM CDT

## 2024-05-28 NOTE — TELEPHONE ENCOUNTER
Notified patient that PA was started and that she can contact her insurance company in the meantime to see what alternatives they can cover while we wait for approval.

## 2024-05-29 RX ORDER — HYDROXYCHLOROQUINE SULFATE 200 MG/1
200 TABLET, FILM COATED ORAL 2 TIMES DAILY
Qty: 60 TABLET | Refills: 0 | Status: SHIPPED | OUTPATIENT
Start: 2024-05-29

## 2024-05-31 ENCOUNTER — CLINICAL SUPPORT (OUTPATIENT)
Dept: FAMILY MEDICINE | Facility: CLINIC | Age: 49
End: 2024-05-31
Payer: MEDICAID

## 2024-05-31 ENCOUNTER — PATIENT MESSAGE (OUTPATIENT)
Dept: FAMILY MEDICINE | Facility: CLINIC | Age: 49
End: 2024-05-31

## 2024-05-31 VITALS — DIASTOLIC BLOOD PRESSURE: 90 MMHG | HEART RATE: 88 BPM | SYSTOLIC BLOOD PRESSURE: 138 MMHG

## 2024-05-31 DIAGNOSIS — I10 ESSENTIAL HYPERTENSION: Primary | ICD-10-CM

## 2024-05-31 PROCEDURE — 99211 OFF/OP EST MAY X REQ PHY/QHP: CPT | Mod: PBBFAC,PO

## 2024-05-31 PROCEDURE — 99999 PR PBB SHADOW E&M-EST. PATIENT-LVL I: CPT | Mod: PBBFAC,,,

## 2024-05-31 NOTE — PROGRESS NOTES
Asha Paige 48 y.o. female is here today for Blood Pressure check.   History of HTN yes.    Review of patient's allergies indicates:   Allergen Reactions    Motrin ib [ibuprofen] Swelling     Swollen lips and knot on her head     Lisinopril Other (See Comments)     Angioedema     Creatinine   Date Value Ref Range Status   03/12/2024 0.8 0.5 - 1.4 mg/dL Final     Sodium   Date Value Ref Range Status   03/12/2024 138 136 - 145 mmol/L Final     Potassium   Date Value Ref Range Status   03/12/2024 4.3 3.5 - 5.1 mmol/L Final   ]  Patient verifies taking blood pressure medications on a regular basis at the same time of the day.     Current Outpatient Medications:     cetirizine (ZYRTEC) 10 MG tablet, Take 1 tablet (10 mg total) by mouth once daily., Disp: 30 tablet, Rfl: 2    EScitalopram oxalate (LEXAPRO) 20 MG tablet, TAKE 1 TABLET(20 MG) BY MOUTH EVERY EVENING, Disp: 90 tablet, Rfl: 0    hydroxychloroquine (PLAQUENIL) 200 mg tablet, Take 1 tablet (200 mg total) by mouth 2 (two) times daily., Disp: 60 tablet, Rfl: 0    hydrOXYzine HCL (ATARAX) 25 MG tablet, Take 2 tablets (50 mg total) by mouth 3 (three) times daily as needed for Anxiety., Disp: 12 tablet, Rfl: 0    irbesartan (AVAPRO) 150 MG tablet, Take 1 tablet (150 mg total) by mouth every evening., Disp: 30 tablet, Rfl: 2    metoprolol succinate (TOPROL-XL) 100 MG 24 hr tablet, Take 1 tablet (100 mg total) by mouth once daily., Disp: 90 tablet, Rfl: 3    trospium (SANCTURA XR) 60 mg Cp24 capsule, Take 1 capsule (60 mg total) by mouth once daily., Disp: 30 capsule, Rfl: 11  Does patient have record of home blood pressure readings no.    Last dose of blood pressure medication was taken at 9 pm 5/30/24.  Patient is asymptomatic.    BP: (!) 138/90 , Pulse: 88 .    Dr. Britton notified.

## 2024-06-05 ENCOUNTER — PATIENT MESSAGE (OUTPATIENT)
Dept: ADMINISTRATIVE | Facility: HOSPITAL | Age: 49
End: 2024-06-05
Payer: MEDICAID

## 2024-06-07 DIAGNOSIS — F41.9 ANXIETY: ICD-10-CM

## 2024-06-07 RX ORDER — ESCITALOPRAM OXALATE 20 MG/1
TABLET ORAL
Qty: 90 TABLET | Refills: 0 | Status: SHIPPED | OUTPATIENT
Start: 2024-06-07

## 2024-06-07 NOTE — TELEPHONE ENCOUNTER
Care Due:                  Date            Visit Type   Department     Provider  --------------------------------------------------------------------------------                                MYCHART                              ANNUAL       [facility] FAM[facility]                              CHECKUP/PHY  MED/ INTERNAL  Last Visit: 03-      S            MED/ GELACIO Britton                              EP -         [facility]                              PRIMARY      MED/ INTERNAL  Next Visit: 07-      CARE ([facility])   MED/ GELACIO Britton                                                            Last  Test          Frequency    Reason                     Performed    Due Date  --------------------------------------------------------------------------------    CBC.........  6 months...  hydroxychloroquine.......  05-   10-    [facility] Embedded Care Due Messages. Reference number: [id number].   6/07/2024 9:44:47 AM CDT

## 2024-07-04 DIAGNOSIS — M06.9 RHEUMATOID ARTHRITIS, INVOLVING UNSPECIFIED SITE, UNSPECIFIED WHETHER RHEUMATOID FACTOR PRESENT: ICD-10-CM

## 2024-07-05 DIAGNOSIS — I10 ESSENTIAL HYPERTENSION: ICD-10-CM

## 2024-07-05 NOTE — TELEPHONE ENCOUNTER
Care Due:                  Date            Visit Type   Department     Provider  --------------------------------------------------------------------------------                                MYCHART                              ANNUAL       [facility]                              CHECKUP/PHY  MED/ INTERNAL  Last Visit: 03-      S            MED/ GELACIO Britton  Next Visit: None Scheduled  None         None Found                                                            Last  Test          Frequency    Reason                     Performed    Due Date  --------------------------------------------------------------------------------    Office Visit  6 months...  hydroxychloroquine.......  03- 09-    CMP.........  6 months...  hydroxychloroquine.......  03- 09-    [facility] Embedded Care Due Messages. Reference number: [id number].   7/04/2024 7:27:53 PM CDT

## 2024-07-05 NOTE — TELEPHONE ENCOUNTER
No care due was identified.  [facility] Embedded Care Due Messages. Reference number: [id number].   7/05/2024 8:22:35 AM CDT

## 2024-07-09 RX ORDER — HYDROXYCHLOROQUINE SULFATE 200 MG/1
TABLET, FILM COATED ORAL
Qty: 60 TABLET | Refills: 0 | Status: SHIPPED | OUTPATIENT
Start: 2024-07-09

## 2024-07-09 RX ORDER — IRBESARTAN 150 MG/1
150 TABLET ORAL
Qty: 30 TABLET | Refills: 2 | Status: SHIPPED | OUTPATIENT
Start: 2024-07-09

## 2024-09-19 ENCOUNTER — CLINICAL SUPPORT (OUTPATIENT)
Dept: ENDOSCOPY | Facility: HOSPITAL | Age: 49
End: 2024-09-19
Attending: OBSTETRICS & GYNECOLOGY
Payer: MEDICAID

## 2024-09-19 ENCOUNTER — TELEPHONE (OUTPATIENT)
Dept: ENDOSCOPY | Facility: HOSPITAL | Age: 49
End: 2024-09-19

## 2024-09-19 DIAGNOSIS — Z12.11 COLON CANCER SCREENING: ICD-10-CM

## 2024-09-30 ENCOUNTER — TELEPHONE (OUTPATIENT)
Dept: UROGYNECOLOGY | Facility: CLINIC | Age: 49
End: 2024-09-30
Payer: MEDICAID

## 2024-09-30 NOTE — TELEPHONE ENCOUNTER
Pt requested to be seen sooner than February for a follow up appointment. She states that the medication isn't helping her incontinence. I've scheduled her with Renee in November and cancelled her appt with Dr. Segovia. Pt verbalized understanding, call ended.

## 2024-10-25 ENCOUNTER — PATIENT MESSAGE (OUTPATIENT)
Dept: ADMINISTRATIVE | Facility: HOSPITAL | Age: 49
End: 2024-10-25
Payer: MEDICAID

## 2024-11-09 DIAGNOSIS — F41.9 ANXIETY: ICD-10-CM

## 2024-11-09 NOTE — TELEPHONE ENCOUNTER
Care Due:                  Date            Visit Type   Department     Provider  --------------------------------------------------------------------------------                                [facility]                              ANNUAL       [facility]                              CHECKUP/PHY  MED/ INTERNAL  Last Visit: 03-      S            MED/ [facility]      Geovanni Britton                              [facility]                              ANNUAL       [facility]                              CHECKUP/PHY  MED/ INTERNAL  Next Visit: 11-      S            MED/ PEDRAFFI Britton                                                            Last  Test          Frequency    Reason                     Performed    Due Date  --------------------------------------------------------------------------------    CBC.........  6 months...  hydroxychloroquine.......  05-   10-    CMP.........  6 months...  hydroxychloroquine.......  03- 09-    [facility] Embedded Care Due Messages. Reference number: [id number].   11/09/2024 11:55:12 AM CST

## 2024-11-11 RX ORDER — ESCITALOPRAM OXALATE 20 MG/1
TABLET ORAL
Qty: 90 TABLET | Refills: 0 | Status: SHIPPED | OUTPATIENT
Start: 2024-11-11

## 2024-11-12 ENCOUNTER — HOSPITAL ENCOUNTER (EMERGENCY)
Facility: HOSPITAL | Age: 49
Discharge: HOME | End: 2024-11-12
Attending: EMERGENCY MEDICINE

## 2024-11-12 VITALS
OXYGEN SATURATION: 100 % | BODY MASS INDEX: 52.42 KG/M2 | WEIGHT: 267 LBS | DIASTOLIC BLOOD PRESSURE: 85 MMHG | SYSTOLIC BLOOD PRESSURE: 185 MMHG | RESPIRATION RATE: 16 BRPM | TEMPERATURE: 98 F | HEIGHT: 60 IN | HEART RATE: 72 BPM

## 2024-11-12 DIAGNOSIS — I10 ESSENTIAL HYPERTENSION: ICD-10-CM

## 2024-11-12 DIAGNOSIS — M17.11 OSTEOARTHRITIS OF RIGHT KNEE, UNSPECIFIED OSTEOARTHRITIS TYPE: Primary | ICD-10-CM

## 2024-11-12 DIAGNOSIS — M25.561 RIGHT KNEE PAIN: ICD-10-CM

## 2024-11-12 PROCEDURE — 99284 EMERGENCY DEPT VISIT MOD MDM: CPT | Mod: 25

## 2024-11-12 PROCEDURE — 25000003 PHARM REV CODE 250: Performed by: EMERGENCY MEDICINE

## 2024-11-12 RX ORDER — OXYCODONE AND ACETAMINOPHEN 10; 325 MG/1; MG/1
1 TABLET ORAL
Status: COMPLETED | OUTPATIENT
Start: 2024-11-12 | End: 2024-11-12

## 2024-11-12 RX ORDER — METOPROLOL SUCCINATE 100 MG/1
100 TABLET, EXTENDED RELEASE ORAL DAILY
Qty: 30 TABLET | Refills: 11 | Status: SHIPPED | OUTPATIENT
Start: 2024-11-12 | End: 2025-11-07

## 2024-11-12 RX ORDER — HYDROCODONE BITARTRATE AND ACETAMINOPHEN 10; 325 MG/1; MG/1
1 TABLET ORAL EVERY 6 HOURS PRN
Qty: 12 TABLET | Refills: 0 | Status: SHIPPED | OUTPATIENT
Start: 2024-11-12 | End: 2024-11-15

## 2024-11-12 RX ORDER — IRBESARTAN 150 MG/1
150 TABLET ORAL DAILY
Qty: 30 TABLET | Refills: 11 | Status: SHIPPED | OUTPATIENT
Start: 2024-11-12 | End: 2025-11-07

## 2024-11-12 RX ORDER — HYDROCODONE BITARTRATE AND ACETAMINOPHEN 10; 325 MG/1; MG/1
1 TABLET ORAL
Status: COMPLETED | OUTPATIENT
Start: 2024-11-12 | End: 2024-11-12

## 2024-11-12 RX ADMIN — OXYCODONE AND ACETAMINOPHEN 1 TABLET: 10; 325 TABLET ORAL at 05:11

## 2024-11-12 RX ADMIN — HYDROCODONE BITARTRATE AND ACETAMINOPHEN 1 TABLET: 10; 325 TABLET ORAL at 03:11

## 2024-11-12 NOTE — ED PROVIDER NOTES
"Encounter Date: 11/12/2024    SCRIBE #1 NOTE: I, Ryder Montgomery Do, am scribing for, and in the presence of,  Dylan Lizama MD. I have scribed the following portions of the note - Other sections scribed: HPI, ROS, PE.       History     Chief Complaint   Patient presents with    Knee Pain     Right knee pain, reports history of arthritis to knees, reports taking tylenol with no relief of pain, increased pain with baring weight, denies falls or trauma to knee, reports taking blood pressure medication emani Paige is a 49 y.o. female, with a pertinent PMHx of PE, HTN, and osteoarthritis of the bilateral knees, who presents to the ED with acute-on-chronic right suprapatellar knee pain onset "years" ago. Patient reports associated right knee swelling. She notes pain is exacerbated with bending and weight bearing. Patient denies any recent injury or trauma to the right knee. She reports non-adherence with Plaquenil. She reports attempted treatment with Tylenol. No other exacerbating or alleviating factors. Patient denies chest pain, SOB, fever, or other associated symptoms. She denies a history of kidney problems. Patient denies any previous knee surgeries. She reports a lisinopril and ibuprofen allergy.     The history is provided by the patient. No  was used.     Review of patient's allergies indicates:   Allergen Reactions    Motrin ib [ibuprofen] Swelling     Swollen lips and knot on her head     Lisinopril Other (See Comments)     Angioedema     Past Medical History:   Diagnosis Date    Allergy     Angio-edema     Eczema     History of pulmonary embolism 2005    Hypertension     Urticaria      Past Surgical History:   Procedure Laterality Date    FRACTURE SURGERY      Plate in right arm       VAGINAL DELIVERY       Family History   Problem Relation Name Age of Onset    Cancer Maternal Grandmother      Allergic rhinitis Mother      Stroke Father      Breast cancer Neg Hx      Ovarian " cancer Neg Hx       Social History     Tobacco Use    Smoking status: Never    Smokeless tobacco: Never   Substance Use Topics    Alcohol use: Yes     Comment: Social     Drug use: No     Review of Systems   Musculoskeletal:  Positive for arthralgias (R knee pain).   All other systems reviewed and are negative.      Physical Exam     Initial Vitals [11/12/24 0236]   BP Pulse Resp Temp SpO2   (!) 201/107 77 18 98.1 °F (36.7 °C) 99 %      MAP       --         Physical Exam    Nursing note and vitals reviewed.  Constitutional: She appears well-developed and well-nourished. She is not diaphoretic. No distress.   HENT:   Head: Normocephalic and atraumatic. Mouth/Throat: Oropharynx is clear and moist.   Eyes: Conjunctivae are normal. Right eye exhibits no discharge. Left eye exhibits no discharge. No scleral icterus.   Neck: No tracheal deviation present. No JVD present.   Cardiovascular:  Normal rate, regular rhythm and intact distal pulses.           Pulmonary/Chest: No stridor. No respiratory distress.   Musculoskeletal:         General: No tenderness or edema.      Comments: Guarding the R knee with no signs of gross deformity or acute injury. No significant laxity is elicited with Lachman, anterior or posterior shelf testing.  No significant laxity is appreciated with varus or valgus testing as well.             Neurological: She is alert and oriented to person, place, and time. She has normal strength. GCS score is 15. GCS eye subscore is 4. GCS verbal subscore is 5. GCS motor subscore is 6.   Guarding the R knee but otherwise PITO with 5/5 strength. Pt displays no facial asymmetry or any signs of gross neuro deficits.       Skin: Skin is warm and dry. Capillary refill takes less than 2 seconds. No rash and no abscess noted. No erythema. No pallor.   Psychiatric: She has a normal mood and affect. Her behavior is normal. Judgment and thought content normal.         ED Course   Procedures  Labs Reviewed - No data to  display       Imaging Results              X-Ray Knee 3 View Right (Final result)  Result time 11/12/24 07:17:21      Final result by Karl Hartley MD (11/12/24 07:17:21)                   Impression:      No convincing evidence of acute fracture or dislocation.    Moderate to large suprapatellar knee joint effusion.      Electronically signed by: Karl Hartley  Date:    11/12/2024  Time:    07:17               Narrative:    EXAMINATION:  XR KNEE 3 VIEW RIGHT    CLINICAL HISTORY:  Pain in right knee    TECHNIQUE:  AP, lateral, and Merchant views of the right knee were performed.    COMPARISON:  Right knee radiograph performed 04/03/2022, 17:49 hours.    FINDINGS:  No definite evidence of acute fracture or dislocation.  Joint spaces appear maintained.  No evidence of radiopaque foreign body.  Tricompartmental DJD.    Moderate to large suprapatellar knee joint effusion.  Enthesopathic change at the patella.  No definite radiopaque foreign body.                                       Medications   HYDROcodone-acetaminophen  mg per tablet 1 tablet (1 tablet Oral Given 11/12/24 0302)   oxyCODONE-acetaminophen  mg per tablet 1 tablet (1 tablet Oral Given 11/12/24 0546)     Pt arrived alert, afebrile, non-toxic in appearance, in no acute respiratory distress with VSS. Analgesics and ice provided to address the pain. XR revealed findings consistent with osteoarthritis. Antihypertensive medication not given as she endorsed taking these PTA. Information has been provided for outpatient F/U along with return precautions. Rx's of antihypertensive and analgesic medication provided as well.    Dylan Lizama MD              Scribe Attestation:   Scribe #1: I performed the above scribed service and the documentation accurately describes the services I performed. I attest to the accuracy of the note.                   I, Dylan Lizama, personally performed the services described in this documentation. All  medical record entries made by the scribe were at my direction and in my presence.  I have reviewed the chart and agree that the record reflects my personal performance and is accurate and complete.    Dylan Lizama MD        Clinical Impression:  Final diagnoses:  [M25.561] Right knee pain  [M17.11] Osteoarthritis of right knee, unspecified osteoarthritis type (Primary)          ED Disposition Condition    Discharge Stable          ED Prescriptions       Medication Sig Dispense Start Date End Date Auth. Provider    HYDROcodone-acetaminophen (NORCO)  mg per tablet Take 1 tablet by mouth every 6 (six) hours as needed for Pain. 12 tablet 11/12/2024 11/15/2024 Dylan Lizama MD    irbesartan (AVAPRO) 150 MG tablet Take 1 tablet (150 mg total) by mouth once daily. 30 tablet 11/12/2024 11/7/2025 Dylan Lizama MD    metoprolol succinate (TOPROL-XL) 100 MG 24 hr tablet Take 1 tablet (100 mg total) by mouth once daily. 30 tablet 11/12/2024 11/7/2025 Dylan Lizama MD          Follow-up Information       Follow up With Specialties Details Why Contact Info    [facility]  Schedule an appointment as soon as possible for a visit in 1 week or with your primary care physician to follow-up on today's visit [address] [place] [address]  [phone]      [facility] Schedule an appointment as soon as possible for a visit  to follow-up on today's visit [address] [facility] [address]  [phone]      [facility] Emergency Dept Emergency Medicine Go to  As needed, If symptoms worsen [address] [facility] [place] [id number] [phone]             Dylan Lizama MD  11/14/24 1743       Dylan Lizama MD  11/14/24 1743

## 2024-11-12 NOTE — DISCHARGE INSTRUCTIONS
Problem Specific Instructions:  Any bone/joint/muscle injury, regardless of broken bones or not may take between 4-6 weeks to heal.  You may ice it throughout the day, compress it with something like an Ace wrap or compression sleeve and elevate it above your heart regularly to prevent or reduce swelling. If you are not improving in that time, follow-up with your primary care provider or orthopedics for re-evaluation. Return to the Emergency Department if you experience worsening pain, numbness, tingling, change of color in the body part, or any other concerning symptoms.     If you would like to follow up with the [facility] for further care of your fracture, please call the [facility] Scheduling Department at [phone] during business hours. Please let the  know you need a fracture follow-up appointment with Orthopedics, and you will be scheduled in the Orthopedic Clinic. Please bring your original Emergency Department discharge papers and disc with you to the clinic appointment.     If you received or are discharged with pain medicine or muscle relaxers, understand that they can make you sleepy or impair your judgement. Do not make important decisions, drink, drive, swim or perform any other tasks you would not otherwise perform while impaired for at least 24 hours after your last dose.      Ensure you follow up with your Primary Care Provider or any additional providers listed on this discharge sheet. While you may be healthy enough to go home today, I cannot predict the exact course of your diagnoses. It is important to remember that some problems are difficult to diagnose and may not be found during your first visit. As such, it is your responsibility to monitor symptoms, follow-up with another healthcare provider, or return to the emergency room for new or worsening concerns. Unless otherwise instructed, continue all home medications and any new medications prescribed to  you in the Emergency Department.

## 2024-11-12 NOTE — ED NOTES
Pt presents to ED with complaints of bilateral knee pain with Hx of arthritis and no new injury. Pt took tylenol without relief and believes this is from the weather change    LOC: Pt is awake alert and aware of environment, oriented X3 and speaking appropriately  Appearance: Pt is in no acute distress, Pt is well groomed and clean  Skin: skin is warm and dry with normal turgor, mucus membranes are moist and pink, skin is intact with no bruising or breakdown  Muskuloskeletal: Pt moves all extremities well, there is no obvious swelling or deformities noted, pulses are intact endorses pain to bilateral knees  Respiratory: Airway is open and patent, respirations are spontaneous and even.  Cardiac: cap refill is <3sec  Neuro: Pt follows commands easily and has no obvious deficits

## 2024-11-20 ENCOUNTER — HOSPITAL ENCOUNTER (EMERGENCY)
Facility: HOSPITAL | Age: 49
Discharge: HOME | End: 2024-11-20
Attending: STUDENT IN AN ORGANIZED HEALTH CARE EDUCATION/TRAINING PROGRAM

## 2024-11-20 VITALS
BODY MASS INDEX: 52.42 KG/M2 | WEIGHT: 267 LBS | OXYGEN SATURATION: 98 % | RESPIRATION RATE: 18 BRPM | SYSTOLIC BLOOD PRESSURE: 166 MMHG | HEART RATE: 78 BPM | DIASTOLIC BLOOD PRESSURE: 78 MMHG | TEMPERATURE: 98 F | HEIGHT: 60 IN

## 2024-11-20 DIAGNOSIS — M54.9 BACK PAIN, UNSPECIFIED BACK LOCATION, UNSPECIFIED BACK PAIN LATERALITY, UNSPECIFIED CHRONICITY: Primary | ICD-10-CM

## 2024-11-20 LAB
B-HCG UR QL: NEGATIVE
B-HCG UR QL: NEGATIVE
BACTERIA #/AREA URNS HPF: NORMAL /HPF
BILIRUB UR QL STRIP: NEGATIVE
CLARITY UR: CLEAR
COLOR UR: YELLOW
CTP QC/QA: YES
GLUCOSE UR QL STRIP: NEGATIVE
HGB UR QL STRIP: ABNORMAL
KETONES UR QL STRIP: NEGATIVE
LEUKOCYTE ESTERASE UR QL STRIP: ABNORMAL
MICROSCOPIC COMMENT: NORMAL
NITRITE UR QL STRIP: NEGATIVE
PH UR STRIP: 6 [PH] (ref 5–8)
PROT UR QL STRIP: ABNORMAL
RBC #/AREA URNS HPF: 2 /HPF (ref 0–4)
SP GR UR STRIP: 1.02 (ref 1–1.03)
SQUAMOUS #/AREA URNS HPF: 4 /HPF
URN SPEC COLLECT METH UR: ABNORMAL
UROBILINOGEN UR STRIP-ACNC: NEGATIVE EU/DL
WBC #/AREA URNS HPF: 5 /HPF (ref 0–5)

## 2024-11-20 PROCEDURE — 99283 EMERGENCY DEPT VISIT LOW MDM: CPT | Mod: 25

## 2024-11-20 PROCEDURE — 81000 URINALYSIS NONAUTO W/SCOPE: CPT | Performed by: STUDENT IN AN ORGANIZED HEALTH CARE EDUCATION/TRAINING PROGRAM

## 2024-11-20 PROCEDURE — 81025 URINE PREGNANCY TEST: CPT | Performed by: STUDENT IN AN ORGANIZED HEALTH CARE EDUCATION/TRAINING PROGRAM

## 2024-11-20 NOTE — ED PROVIDER NOTES
Encounter Date: 11/20/2024    SCRIBE #1 NOTE: [employer] am scribing for, and in the presence of,  Luis Carlos Perez MD.   SCRIBE #2 NOTE: I Ryder Laomeenakshi Holt, am scribing for, and in the presence of,  Luis Carlos Perez MD.     History     Chief Complaint   Patient presents with    Flank Pain     Pt arrived in ED, c/o right side flank pain and nausea x one week. Pt denies any trauma. Pt denies any issues with urinating. Pt denies any CP, SOB, abd pain, vomiting or diarrhea.     Hypertension     Asha Paige is a 49 y.o. female, with a pertinent PMHx of HTN and PE, who presents to the ED with chief complaint of dull, constant, 6/10 right flank pain ongoing for past 1 week. Patient mentions previous episodes of bilateral flank pain, however she currently denies any left flank pain. She reports coughing prior to these episode. She reports her coughing has since improved. She notes pain is exacerbated with coughing. She denies recent menstrual periods, endorses an IUD in place. She notes baseline bowel movement. Patient reports adherence with [facility]. She denies any alleviating factors. Patient denies chest pain, SOB, dysuria, hematuria, frequency, abdominal pain, nausea, vomiting, or other associated symptoms. She denies prior history of kidney stones.     The history is provided by the patient. No  was used.     Review of patient's allergies indicates:   Allergen Reactions    Motrin ib [ibuprofen] Swelling     Swollen lips and knot on her head     Lisinopril Other (See Comments)     Angioedema     Past Medical History:   Diagnosis Date    Allergy     Angio-edema     Eczema     History of pulmonary embolism 2005    Hypertension     Urticaria      Past Surgical History:   Procedure Laterality Date    FRACTURE SURGERY      Plate in right arm       VAGINAL DELIVERY       Family History   Problem Relation Name Age of Onset    Cancer Maternal Grandmother      Allergic rhinitis Mother       Stroke Father      Breast cancer Neg Hx      Ovarian cancer Neg Hx       Social History     Tobacco Use    Smoking status: Never    Smokeless tobacco: Never   Substance Use Topics    Alcohol use: Yes     Comment: Social     Drug use: No     Review of Systems   Constitutional:  Negative for chills and fever.   HENT:  Negative for ear pain and sore throat.    Eyes:  Negative for pain.   Respiratory:  Negative for cough and shortness of breath.    Cardiovascular:  Negative for chest pain.   Gastrointestinal:  Negative for abdominal pain, diarrhea, nausea and vomiting.   Genitourinary:  Positive for flank pain. Negative for dysuria, frequency and hematuria.   Musculoskeletal:  Negative for arthralgias, back pain and neck pain.   Skin:  Negative for rash.   Neurological:  Negative for headaches.       Physical Exam     Initial Vitals [11/20/24 1745]   BP Pulse Resp Temp SpO2   (!) 223/126 94 16 98.1 °F (36.7 °C) 100 %      MAP       --         Physical Exam    Nursing note and vitals reviewed.  Constitutional: She appears well-developed and well-nourished. She is not diaphoretic.  Non-toxic appearance. She does not have a sickly appearance. She does not appear ill. No distress.   HENT:   Head: Normocephalic and atraumatic.   Right Ear: External ear normal.   Left Ear: External ear normal.   Nose: Nose normal. Mouth/Throat: Oropharynx is clear and moist. No oropharyngeal exudate.   Eyes: EOM are normal.   Neck: Neck supple.   Cardiovascular:  Normal rate, regular rhythm and normal heart sounds.           Pulmonary/Chest: Breath sounds normal. No respiratory distress. She has no wheezes. She has no rhonchi.   Abdominal: Abdomen is soft. Bowel sounds are normal. She exhibits no distension. There is no abdominal tenderness.   No right CVA tenderness.  No left CVA tenderness.   Musculoskeletal:         General: Normal range of motion.      Cervical back: Neck supple.      Comments: No focal tenderness to the bilateral  cervical, thoracic, or lumbar paraspinal musculature.      Neurological: She is alert.   Skin: Skin is warm and dry. No rash noted.   Psychiatric: She has a normal mood and affect.         ED Course   Procedures  Labs Reviewed   URINALYSIS, REFLEX TO URINE CULTURE - Abnormal       Result Value    Specimen UA Urine, Clean Catch      Color, UA Yellow      Appearance, UA Clear      pH, UA 6.0      Specific Gravity, UA 1.025      Protein, UA Trace (*)     Glucose, UA Negative      Ketones, UA Negative      Bilirubin (UA) Negative      Occult Blood UA 1+ (*)     Nitrite, UA Negative      Urobilinogen, UA Negative      Leukocytes, UA 1+ (*)     Narrative:     Specimen Source->Urine   PREGNANCY TEST, URINE RAPID    Preg Test, Ur Negative      Narrative:     Specimen Source->Urine   URINALYSIS MICROSCOPIC    RBC, UA 2      WBC, UA 5      Bacteria Rare      Squam Epithel, UA 4      Microscopic Comment SEE COMMENT      Narrative:     Specimen Source->Urine   POCT URINE PREGNANCY    POC Preg Test, Ur Negative       Acceptable Yes            Imaging Results              X-Ray Chest PA And Lateral (Final result)  Result time 11/20/24 19:01:59      Final result by Karen Tirado MD (11/20/24 19:01:59)                   Impression:      No acute intrathoracic abnormality.      Electronically signed by: Karen Tirado  Date:    11/20/2024  Time:    19:01               Narrative:    EXAMINATION:  CHEST PA AND LATERAL    CLINICAL HISTORY:  Chest pain, unspecified    TECHNIQUE:  PA and lateral chest radiographs    COMPARISON:  06/29/2019    FINDINGS:  The cardiac silhouette is within normal limits.   There is no focal consolidation, pneumothorax, or pleural effusion. Mild asymmetric elevation of right hemidiaphragm is present.  There is incompletely imaged screw plate device at the right humerus.                                       Medications - No data to display  Medical Decision Making  Amount and/or  Complexity of Data Reviewed  Labs: ordered. Decision-making details documented in ED Course.  Radiology: ordered. Decision-making details documented in ED Course.     systolic; vitals otherwise unremarkable   Patient is well-appearing and in no acute distress    She has no CVA tenderness; I have a low suspicion for nephrolithiasis   Patient's pain started after coughing and it was bilateral now it is just on the right side   This pain does appear to be musculoskeletal nature   UA with 1+ blood, however I still feel this is unlikely renal stone given how well-appearing the patient was clinically and that this pain is reproducible  Chest x-ray without any evidence of pneumonia or other explanation of her pain  Repeat blood pressure is 185 systolic; she notes she takes her blood pressure medication in the evening and she indicated she would go home and take her home medications  Advised Tylenol, lidocaine patches, heat  Advised her to follow up with the regular physician her pain does not improve in the next 1-2 weeks   Patient verbalized understanding agreement with the plan   Patient is stable for discharge    I discussed with the patient/family the diagnosis, treatment plan, indications for return to the emergency department, and for expected follow-up. The patient/family verbalized an understanding. The patient/family is asked if there are any questions or concerns. We discuss the case, until all issues are addressed to the patient/familys satisfaction. Patient/family understands and is agreeable to the plan.   Luis Carlos Perez    DISCLAIMER: This note was prepared with [employer] voice recognition transcription software. Garbled syntax, mangled pronouns, and other bizarre constructions may be attributed to that software system.          Scribe Attestation:   Scribe #1: I performed the above scribed service and the documentation accurately describes the services I performed. I attest to the accuracy of the  note.  Scribe #2: I performed the above scribed service and the documentation accurately describes the services I performed. I attest to the accuracy of the note.                               Clinical Impression:  Final diagnoses:  [R07.9] Chest pain  [M54.9] Back pain, unspecified back location, unspecified back pain laterality, unspecified chronicity (Primary)       I, Luis Carlos Perez MD, personally performed the services described in this documentation. All medical record entries made by the scribe were at my direction and in my presence. I have reviewed the chart and agree that the record reflects my personal performance and is accurate and complete.      DISCLAIMER: This note was prepared with [employer] voice recognition transcription software. Garbled syntax, mangled pronouns, and other bizarre constructions may be attributed to that software system.     ED Disposition Condition    Discharge Stable          ED Prescriptions    None       Follow-up Information    None          Luis Carlos Perez MD  11/20/24 1072

## 2024-11-21 NOTE — ED NOTES
Asha Paige, a 49 y.o. female presents to the ED w/ complaint of left flank pain without dysuria x one week. Patient is AAOx3, VSS, NAD. Denies CP, SOB, N/V/D/C, cough, fever, numbness, or tingling. Bed locked, in lowest position, bed rails up x2, call light in reach, all monitoring attached.    Triage note:  Chief Complaint   Patient presents with    Flank Pain     Pt arrived in ED, c/o right side flank pain and nausea x one week. Pt denies any trauma. Pt denies any issues with urinating. Pt denies any CP, SOB, abd pain, vomiting or diarrhea.     Hypertension     Review of patient's allergies indicates:   Allergen Reactions    Motrin ib [ibuprofen] Swelling     Swollen lips and knot on her head     Lisinopril Other (See Comments)     Angioedema     Past Medical History:   Diagnosis Date    Allergy     Angio-edema     Eczema     History of pulmonary embolism 2005    Hypertension     Urticaria

## 2024-11-21 NOTE — DISCHARGE INSTRUCTIONS
Suspect he likely has a musculoskeletal back pain related to your cough.  Take Tylenol and Motrin as needed for your pain.  I suspect it will improve next several days as your upper respiratory illness resolves.  If your pain does not improve next 1-2 weeks, see your regular physician for further evaluation and management.  You can return for any concerning symptoms.  Thank you.

## 2024-11-25 ENCOUNTER — TELEPHONE (OUTPATIENT)
Dept: FAMILY MEDICINE | Facility: CLINIC | Age: 49
End: 2024-11-25

## 2024-11-25 NOTE — TELEPHONE ENCOUNTER
----- Message from Allie sent at 11/25/2024 10:26 AM CST -----  Type:  RX Refill Request    Who Called: EZE DUENAS [[id number]]    Refill or New Rx:refill     RX Name and Strength:EScitalopram oxalate (LEXAPRO) 20 MG tablet    How is the patient currently taking it? (ex. 1XDay):TAKE 1 TABLET(20 MG) BY MOUTH EVERY EVENING    Is this a 30 day or 90 day RX:30    Preferred Pharmacy with phone number:[id number] - [place], [place] - [address]   Phone: [phone]  Fax: [phone]    Local or Mail Order:local     Ordering Provider:adonis      Would the patient rather a call back or a response via [facility]? Call back or [facility]     Best Call Back Number: [phone]    Additional Information: patient states she would like to get a refill on her medication. Patient states she is in between insurances a the time but would like to have a refill sent in as she has been without her medication for a while. Patient states she would like to have this sent in as soon as possible please call back or message through portal with assistance.

## 2024-11-26 ENCOUNTER — HOSPITAL ENCOUNTER (EMERGENCY)
Facility: HOSPITAL | Age: 49
Discharge: HOME | End: 2024-11-26
Attending: EMERGENCY MEDICINE

## 2024-11-26 VITALS
BODY MASS INDEX: 52.42 KG/M2 | DIASTOLIC BLOOD PRESSURE: 100 MMHG | HEIGHT: 60 IN | RESPIRATION RATE: 18 BRPM | SYSTOLIC BLOOD PRESSURE: 185 MMHG | HEART RATE: 82 BPM | TEMPERATURE: 98 F | WEIGHT: 267 LBS | OXYGEN SATURATION: 99 %

## 2024-11-26 VITALS
BODY MASS INDEX: 52.42 KG/M2 | OXYGEN SATURATION: 98 % | DIASTOLIC BLOOD PRESSURE: 87 MMHG | WEIGHT: 267 LBS | RESPIRATION RATE: 20 BRPM | HEART RATE: 84 BPM | SYSTOLIC BLOOD PRESSURE: 187 MMHG | TEMPERATURE: 98 F | HEIGHT: 60 IN

## 2024-11-26 DIAGNOSIS — R11.2 NAUSEA AND VOMITING, UNSPECIFIED VOMITING TYPE: Primary | ICD-10-CM

## 2024-11-26 DIAGNOSIS — M54.6 ACUTE RIGHT-SIDED THORACIC BACK PAIN: ICD-10-CM

## 2024-11-26 DIAGNOSIS — I10 ESSENTIAL HYPERTENSION: ICD-10-CM

## 2024-11-26 DIAGNOSIS — I10 HYPERTENSION, UNSPECIFIED TYPE: Primary | ICD-10-CM

## 2024-11-26 DIAGNOSIS — R19.7 DIARRHEA, UNSPECIFIED TYPE: ICD-10-CM

## 2024-11-26 LAB
ALBUMIN SERPL BCP-MCNC: 3.3 G/DL (ref 3.5–5.2)
ALP SERPL-CCNC: 83 U/L (ref 40–150)
ALT SERPL W/O P-5'-P-CCNC: 14 U/L (ref 10–44)
ANION GAP SERPL CALC-SCNC: 8 MMOL/L (ref 8–16)
AST SERPL-CCNC: 18 U/L (ref 10–40)
B-HCG UR QL: NEGATIVE
BACTERIA #/AREA URNS HPF: NORMAL /HPF
BASOPHILS # BLD AUTO: 0.04 K/UL (ref 0–0.2)
BASOPHILS NFR BLD: 0.4 % (ref 0–1.9)
BILIRUB SERPL-MCNC: 0.8 MG/DL (ref 0.1–1)
BILIRUB UR QL STRIP: NEGATIVE
BUN SERPL-MCNC: 8 MG/DL (ref 6–20)
CALCIUM SERPL-MCNC: 9.5 MG/DL (ref 8.7–10.5)
CHLORIDE SERPL-SCNC: 106 MMOL/L (ref 95–110)
CLARITY UR: CLEAR
CO2 SERPL-SCNC: 23 MMOL/L (ref 23–29)
COLOR UR: YELLOW
CREAT SERPL-MCNC: 0.7 MG/DL (ref 0.5–1.4)
CTP QC/QA: YES
DIFFERENTIAL METHOD BLD: ABNORMAL
EOSINOPHIL # BLD AUTO: 0.1 K/UL (ref 0–0.5)
EOSINOPHIL NFR BLD: 0.8 % (ref 0–8)
ERYTHROCYTE [DISTWIDTH] IN BLOOD BY AUTOMATED COUNT: 16.3 % (ref 11.5–14.5)
EST. GFR  (NO RACE VARIABLE): >60 ML/MIN/1.73 M^2
GLUCOSE SERPL-MCNC: 81 MG/DL (ref 70–110)
GLUCOSE UR QL STRIP: NEGATIVE
HCT VFR BLD AUTO: 41.8 % (ref 37–48.5)
HGB BLD-MCNC: 13.2 G/DL (ref 12–16)
HGB UR QL STRIP: ABNORMAL
IMM GRANULOCYTES # BLD AUTO: 0.05 K/UL (ref 0–0.04)
IMM GRANULOCYTES NFR BLD AUTO: 0.5 % (ref 0–0.5)
KETONES UR QL STRIP: NEGATIVE
LEUKOCYTE ESTERASE UR QL STRIP: ABNORMAL
LIPASE SERPL-CCNC: 6 U/L (ref 4–60)
LYMPHOCYTES # BLD AUTO: 1.3 K/UL (ref 1–4.8)
LYMPHOCYTES NFR BLD: 11.8 % (ref 18–48)
MCH RBC QN AUTO: 24.3 PG (ref 27–31)
MCHC RBC AUTO-ENTMCNC: 31.6 G/DL (ref 32–36)
MCV RBC AUTO: 77 FL (ref 82–98)
MICROSCOPIC COMMENT: NORMAL
MONOCYTES # BLD AUTO: 0.7 K/UL (ref 0.3–1)
MONOCYTES NFR BLD: 6.2 % (ref 4–15)
NEUTROPHILS # BLD AUTO: 8.5 K/UL (ref 1.8–7.7)
NEUTROPHILS NFR BLD: 80.3 % (ref 38–73)
NITRITE UR QL STRIP: NEGATIVE
NRBC BLD-RTO: 0 /100 WBC
PH UR STRIP: 6 [PH] (ref 5–8)
PLATELET # BLD AUTO: 316 K/UL (ref 150–450)
PMV BLD AUTO: 9.3 FL (ref 9.2–12.9)
POTASSIUM SERPL-SCNC: 4.1 MMOL/L (ref 3.5–5.1)
PROT SERPL-MCNC: 8.5 G/DL (ref 6–8.4)
PROT UR QL STRIP: NEGATIVE
RBC # BLD AUTO: 5.43 M/UL (ref 4–5.4)
RBC #/AREA URNS HPF: 3 /HPF (ref 0–4)
SODIUM SERPL-SCNC: 137 MMOL/L (ref 136–145)
SP GR UR STRIP: 1.01 (ref 1–1.03)
SQUAMOUS #/AREA URNS HPF: 4 /HPF
URN SPEC COLLECT METH UR: ABNORMAL
UROBILINOGEN UR STRIP-ACNC: NEGATIVE EU/DL
WBC # BLD AUTO: 10.58 K/UL (ref 3.9–12.7)
WBC #/AREA URNS HPF: 4 /HPF (ref 0–5)

## 2024-11-26 PROCEDURE — 25000003 PHARM REV CODE 250: Performed by: EMERGENCY MEDICINE

## 2024-11-26 PROCEDURE — 99284 EMERGENCY DEPT VISIT MOD MDM: CPT | Mod: 25,27

## 2024-11-26 PROCEDURE — 83690 ASSAY OF LIPASE: CPT | Performed by: EMERGENCY MEDICINE

## 2024-11-26 PROCEDURE — 96375 TX/PRO/DX INJ NEW DRUG ADDON: CPT

## 2024-11-26 PROCEDURE — 80053 COMPREHEN METABOLIC PANEL: CPT | Performed by: EMERGENCY MEDICINE

## 2024-11-26 PROCEDURE — 96374 THER/PROPH/DIAG INJ IV PUSH: CPT

## 2024-11-26 PROCEDURE — 63600175 PHARM REV CODE 636 W HCPCS: Performed by: EMERGENCY MEDICINE

## 2024-11-26 PROCEDURE — 96361 HYDRATE IV INFUSION ADD-ON: CPT

## 2024-11-26 PROCEDURE — 99285 EMERGENCY DEPT VISIT HI MDM: CPT | Mod: 25

## 2024-11-26 PROCEDURE — 85025 COMPLETE CBC W/AUTO DIFF WBC: CPT | Performed by: EMERGENCY MEDICINE

## 2024-11-26 PROCEDURE — 81000 URINALYSIS NONAUTO W/SCOPE: CPT | Performed by: EMERGENCY MEDICINE

## 2024-11-26 PROCEDURE — 81025 URINE PREGNANCY TEST: CPT | Performed by: PHYSICIAN ASSISTANT

## 2024-11-26 RX ORDER — IRBESARTAN 300 MG/1
300 TABLET ORAL NIGHTLY
Qty: 90 TABLET | Refills: 3 | Status: SHIPPED | OUTPATIENT
Start: 2024-11-26 | End: 2025-11-26

## 2024-11-26 RX ORDER — DICYCLOMINE HYDROCHLORIDE 20 MG/1
20 TABLET ORAL 3 TIMES DAILY PRN
Qty: 15 TABLET | Refills: 0 | Status: SHIPPED | OUTPATIENT
Start: 2024-11-26 | End: 2024-12-26

## 2024-11-26 RX ORDER — TIZANIDINE 4 MG/1
4 TABLET ORAL EVERY 6 HOURS PRN
Qty: 30 TABLET | Refills: 0 | Status: SHIPPED | OUTPATIENT
Start: 2024-11-26 | End: 2024-12-06

## 2024-11-26 RX ORDER — HYDROMORPHONE HYDROCHLORIDE 1 MG/ML
0.5 INJECTION, SOLUTION INTRAMUSCULAR; INTRAVENOUS; SUBCUTANEOUS
Status: COMPLETED | OUTPATIENT
Start: 2024-11-26 | End: 2024-11-26

## 2024-11-26 RX ORDER — ONDANSETRON HYDROCHLORIDE 2 MG/ML
4 INJECTION, SOLUTION INTRAVENOUS
Status: COMPLETED | OUTPATIENT
Start: 2024-11-26 | End: 2024-11-26

## 2024-11-26 RX ORDER — HYDROCODONE BITARTRATE AND ACETAMINOPHEN 5; 325 MG/1; MG/1
1 TABLET ORAL EVERY 6 HOURS PRN
Qty: 20 TABLET | Refills: 0 | Status: SHIPPED | OUTPATIENT
Start: 2024-11-26 | End: 2024-12-06

## 2024-11-26 RX ORDER — ONDANSETRON 8 MG/1
8 TABLET, ORALLY DISINTEGRATING ORAL EVERY 8 HOURS PRN
Qty: 20 TABLET | Refills: 0 | Status: SHIPPED | OUTPATIENT
Start: 2024-11-26

## 2024-11-26 RX ORDER — LABETALOL HYDROCHLORIDE 5 MG/ML
10 INJECTION, SOLUTION INTRAVENOUS
Status: COMPLETED | OUTPATIENT
Start: 2024-11-26 | End: 2024-11-26

## 2024-11-26 RX ADMIN — SODIUM CHLORIDE 1000 ML: 9 INJECTION, SOLUTION INTRAVENOUS at 12:11

## 2024-11-26 RX ADMIN — HYDROMORPHONE HYDROCHLORIDE 0.5 MG: 1 INJECTION, SOLUTION INTRAMUSCULAR; INTRAVENOUS; SUBCUTANEOUS at 08:11

## 2024-11-26 RX ADMIN — ONDANSETRON 4 MG: 2 INJECTION INTRAMUSCULAR; INTRAVENOUS at 12:11

## 2024-11-26 RX ADMIN — LABETALOL HYDROCHLORIDE 10 MG: 5 INJECTION INTRAVENOUS at 07:11

## 2024-11-26 NOTE — Clinical Note
"Asha Hoang" Paige was seen and treated in our emergency department on 11/26/2024.  She may return to work on 11/27/2024.       If you have any questions or concerns, please don't hesitate to call.       RN    "

## 2024-11-26 NOTE — ED PROVIDER NOTES
Encounter Date: 11/26/2024       History     Chief Complaint   Patient presents with    Vomiting     Pt arrived in ED, c/o n/v/d x 2 days. Pt denies any CP, SOB, or abd pain.     Diarrhea     50 yo with HTN presents with nausea and vomiting for 2 days. Diarrhea today. Has had RUQ pain intermittently for one week. No chest pain. No fever. No urinary symptoms.       Review of patient's allergies indicates:   Allergen Reactions    Motrin ib [ibuprofen] Swelling     Swollen lips and knot on her head     Lisinopril Other (See Comments)     Angioedema     Past Medical History:   Diagnosis Date    Allergy     Angio-edema     Eczema     History of pulmonary embolism 2005    Hypertension     Urticaria      Past Surgical History:   Procedure Laterality Date    FRACTURE SURGERY      Plate in right arm       VAGINAL DELIVERY       Family History   Problem Relation Name Age of Onset    Cancer Maternal Grandmother      Allergic rhinitis Mother      Stroke Father      Breast cancer Neg Hx      Ovarian cancer Neg Hx       Social History     Tobacco Use    Smoking status: Never    Smokeless tobacco: Never   Substance Use Topics    Alcohol use: Yes     Comment: Social     Drug use: No     Review of Systems    Physical Exam     Initial Vitals [11/26/24 1205]   BP Pulse Resp Temp SpO2   (!) 187/91 90 18 97.9 °F (36.6 °C) 99 %      MAP       --         Physical Exam    Nursing note and vitals reviewed.  Constitutional: She appears well-developed and well-nourished.   Eyes: EOM are normal. Pupils are equal, round, and reactive to light.   Neck: Neck supple. No thyromegaly present. No JVD present.   Normal range of motion.  Cardiovascular:  Normal rate, regular rhythm, normal heart sounds and intact distal pulses.     Exam reveals no gallop and no friction rub.       No murmur heard.  Pulmonary/Chest: Breath sounds normal. No respiratory distress. She has no wheezes. She has no rhonchi. She has no rales.   Abdominal: Abdomen is soft.  Bowel sounds are normal. She exhibits no distension.   Discomfort with RUQ ttp. No cvattp.  There is no rebound and no guarding.   Musculoskeletal:         General: No tenderness or edema. Normal range of motion.      Cervical back: Normal range of motion and neck supple.     Neurological: She is alert and oriented to person, place, and time. She has normal strength.   Skin: Skin is warm and dry.         ED Course   Procedures  Labs Reviewed   CBC W/ AUTO DIFFERENTIAL - Abnormal       Result Value    WBC 10.58      RBC 5.43 (*)     Hemoglobin 13.2      Hematocrit 41.8      MCV 77 (*)     MCH 24.3 (*)     MCHC 31.6 (*)     RDW 16.3 (*)     Platelets 316      MPV 9.3      Immature Granulocytes 0.5      Gran # (ANC) 8.5 (*)     Immature Grans (Abs) 0.05 (*)     Lymph # 1.3      Mono # 0.7      Eos # 0.1      Baso # 0.04      nRBC 0      Gran % 80.3 (*)     Lymph % 11.8 (*)     Mono % 6.2      Eosinophil % 0.8      Basophil % 0.4      Differential Method Automated     COMPREHENSIVE METABOLIC PANEL - Abnormal    Sodium 137      Potassium 4.1      Chloride 106      CO2 23      Glucose 81      BUN 8      Creatinine 0.7      Calcium 9.5      Total Protein 8.5 (*)     Albumin 3.3 (*)     Total Bilirubin 0.8      Alkaline Phosphatase 83      AST 18      ALT 14      eGFR >60      Anion Gap 8     URINALYSIS, REFLEX TO URINE CULTURE - Abnormal    Specimen UA Urine, Clean Catch      Color, UA Yellow      Appearance, UA Clear      pH, UA 6.0      Specific Gravity, UA 1.015      Protein, UA Negative      Glucose, UA Negative      Ketones, UA Negative      Bilirubin (UA) Negative      Occult Blood UA 1+ (*)     Nitrite, UA Negative      Urobilinogen, UA Negative      Leukocytes, UA Trace (*)     Narrative:     Specimen Source->Urine   LIPASE    Lipase 6     URINALYSIS MICROSCOPIC    RBC, UA 3      WBC, UA 4      Bacteria Rare      Squam Epithel, UA 4      Microscopic Comment SEE COMMENT      Narrative:     Specimen Source->Urine           Imaging Results              US Abdomen Limited (Final result)  Result time 11/26/24 14:22:24      Final result by Mando Finch MD (11/26/24 14:22:24)                   Impression:      No significant sonographic abnormality.      Electronically signed by: Mando Finch MD  Date:    11/26/2024  Time:    14:22               Narrative:    EXAMINATION:  US ABDOMEN LIMITED    CLINICAL HISTORY:  RUQ ABD Pain;    TECHNIQUE:  Limited ultrasound of the right upper quadrant of the abdomen targeted on the biliary system.    COMPARISON:  Gallbladder ultrasound 06/29/2019    FINDINGS:  Gallbladder: No calculi, wall thickening, or pericholecystic fluid.  No sonographic Sandoval's sign.    Biliary system: The common duct is not dilated, measuring 2-3 mm.  No intrahepatic ductal dilatation.    Miscellaneous: No upper abdominal ascites.  Partially imaged portions of the pancreas are within normal limits.  No right hydronephrosis.                                       Medications   sodium chloride 0.9% bolus 1,000 mL 1,000 mL (1,000 mLs Intravenous New Bag 11/26/24 1247)   ondansetron injection 4 mg (4 mg Intravenous Given 11/26/24 1246)     Medical Decision Making  Due to RUQ pain for one week with gi symptoms. Workup for gallbladder disease was done. This is negative. Specifically no gallstones, biliary obstruction, or gallbladder infection. BP here is elevated. No symptoms of end organ damage. Pt tolerating po. Abdominal exam benign. Likely viral, less likely food poisoning. PT has some suspicion of ground meat.  No fever. No dehydration. No evidence of end organ damage form blood pressure. No admission criteria. Will discharge with symptomatic treatment. Follow up Primary care for BP recheck.                                   Clinical Impression:  Final diagnoses:  [R11.2] Nausea and vomiting, unspecified vomiting type (Primary)  [R19.7] Diarrhea, unspecified type          ED Disposition Condition    Discharge Stable           ED Prescriptions       Medication Sig Dispense Start Date End Date Auth. Provider    ondansetron (ZOFRAN-ODT) 8 MG TbDL Take 1 tablet (8 mg total) by mouth every 8 (eight) hours as needed (Nausea). 20 tablet 11/26/2024 -- Gume Weber MD    dicyclomine (BENTYL) 20 mg tablet Take 1 tablet (20 mg total) by mouth 3 (three) times daily as needed (abdominal cramping). 15 tablet 11/26/2024 12/26/2024 Gume Weber MD          Follow-up Information       Follow up With Specialties Details Why Contact Info    Geovanni Britton MD Internal Medicine, Pediatrics Schedule an appointment as soon as possible for a visit  for blood pressure recheck [address] [facility]  [place] [id number]  [phone]               Gume Weber MD  11/26/24 4469

## 2024-11-27 NOTE — ED PROVIDER NOTES
"Encounter Date: 11/26/2024    SCRIBE #1 NOTE: I, Barbara Debbie, am scribing for, and in the presence of,  Gume Weber MD.       History     Chief Complaint   Patient presents with    Flank Pain     Pt arrived in ED, c/o right side flank pain. Pt reports sneezing and feeling a "pop" to her right side. Pt denies any issues with urinating. Pt denies any CP, SOB, abd pain or n/v/d    Hypertension     50 yo F w/ PMHx of PE, HTN, presenting to the ED for R sided flank pain. Patient was seen at our facility earlier today for GI symptoms and the same flank pain. Her sx resolved, but after going home, she sneezed and felt a crack at her R flank and R posterior rib region. She reports worsening 10/10 pain since prompting her to the ED, worsening pain upon inspiration. No other exacerbating or alleviating factors. No dyspnea or chest pain.    The history is provided by the patient.     Review of patient's allergies indicates:   Allergen Reactions    Motrin ib [ibuprofen] Swelling     Swollen lips and knot on her head     Lisinopril Other (See Comments)     Angioedema     Past Medical History:   Diagnosis Date    Allergy     Angio-edema     Eczema     History of pulmonary embolism 2005    Hypertension     Urticaria      Past Surgical History:   Procedure Laterality Date    FRACTURE SURGERY      Plate in right arm       VAGINAL DELIVERY       Family History   Problem Relation Name Age of Onset    Cancer Maternal Grandmother      Allergic rhinitis Mother      Stroke Father      Breast cancer Neg Hx      Ovarian cancer Neg Hx       Social History     Tobacco Use    Smoking status: Never    Smokeless tobacco: Never   Substance Use Topics    Alcohol use: Yes     Comment: Social     Drug use: No     Review of Systems   Constitutional:  Negative for chills and fever.   HENT:  Negative for congestion, rhinorrhea and sore throat.    Eyes:  Negative for visual disturbance.   Respiratory:  Negative for cough and shortness of breath.  "   Cardiovascular:  Negative for chest pain.   Gastrointestinal:  Negative for abdominal pain, diarrhea, nausea and vomiting.   Genitourinary:  Positive for flank pain (R). Negative for dysuria, frequency and hematuria.   Musculoskeletal:  Positive for arthralgias (R posterior rib). Negative for back pain.   Skin:  Negative for rash.   Neurological:  Negative for dizziness, weakness and headaches.       Physical Exam     Initial Vitals [11/26/24 1824]   BP Pulse Resp Temp SpO2   (!) 234/115 91 18 98 °F (36.7 °C) 97 %      MAP       --         Physical Exam    Nursing note and vitals reviewed.  Constitutional: She appears well-developed and well-nourished.   HENT:   Head: Normocephalic and atraumatic.   Eyes: EOM are normal. Pupils are equal, round, and reactive to light.   Neck: Neck supple. No thyromegaly present. No JVD present.   Normal range of motion.  Cardiovascular:  Normal rate and regular rhythm.     Exam reveals no gallop and no friction rub.       No murmur heard.  Pulmonary/Chest: Breath sounds normal. No respiratory distress.   Abdominal: Abdomen is soft. Bowel sounds are normal. There is no abdominal tenderness.   Musculoskeletal:         General: No tenderness or edema. Normal range of motion.      Cervical back: Normal range of motion and neck supple.      Comments: Extremely reproducible R lateral rib pain     Neurological: She is alert and oriented to person, place, and time. She has normal strength. GCS score is 15. GCS eye subscore is 4. GCS verbal subscore is 5. GCS motor subscore is 6.   Skin: Skin is warm and dry. Capillary refill takes less than 2 seconds.   Psychiatric: She has a normal mood and affect.         ED Course   Procedures  Labs Reviewed   POCT URINE PREGNANCY       Result Value    POC Preg Test, Ur Negative       Acceptable Yes            Imaging Results              XR Ribs Min 3 Views w/PA Chest Right (Final result)  Result time 11/26/24 20:32:00      Final result  by Sobeida Guo MD (11/26/24 20:32:00)                   Impression:      No acute intrathoracic process identified.    No acute displaced right rib fracture seen.      Electronically signed by: Sobeida Guo MD  Date:    11/26/2024  Time:    20:32               Narrative:    EXAMINATION:  XR RIBS MIN 3 VIEWS W/ PA CHEST RIGHT    CLINICAL HISTORY:  Right rib pain;    TECHNIQUE:  AP chest and right ribs 5 total views were obtained.    COMPARISON:  11/20/2024.    FINDINGS:  Cardiac silhouette is normal in size.  Lungs are symmetrically expanded.  No evidence of focal consolidative process, pneumothorax, or significant pleural effusion.  No acute osseous abnormality identified.  No acute displaced right-sided rib fractures are identified.                                       Medications   labetaloL injection 10 mg (10 mg Intravenous Given 11/26/24 1941)   HYDROmorphone injection 0.5 mg (0.5 mg Intravenous Given 11/26/24 2008)     Medical Decision Making  48 yo F w/ PMHx of PE, HTN, presenting to the ED for R sided flank pain. Patient was seen at our facility earlier today for GI symptoms and the same flank pain. Her sx resolved, but after going home, she sneezed and felt a crack at her R flank and R posterior rib region. She reports worsening 10/10 pain since prompting her to the ED, worsening pain upon inspiration. Physical exam as above. Patient hypertensive at 234/115 but other VSS and nursing notes reviewed. Differential diagnosis include but are not limited to: strain, sprain, contusion, rib fracture, PNX. Will order rib Xray and reassess. Will treat pain and also administer labetalol.     Amount and/or Complexity of Data Reviewed  Labs: ordered. Decision-making details documented in ED Course.     Details: Upt negative  Radiology: ordered. Decision-making details documented in ED Course.    Risk  Prescription drug management.    Chest x-ray is normal.  Right rib series shows no fracture.  Patient has  highly reproducible right posterior thoracic wall tenderness.  Highly consistent with musculoskeletal pain.  Blood pressure trending downward.  Patient was to double her irbesartan to 300 mg.  New prescription given.  Will prescribe muscle relaxer and breakthrough pain medication.  Patient feels comfortable going home.  Pain is currently controlled.        Scribe Attestation:   Scribe #1: I performed the above scribed service and the documentation accurately describes the services I performed. I attest to the accuracy of the note.                           I, Gume Weber, personally performed the services described in this documentation. All medical record entries made by the scribe were at my direction and in my presence. I have reviewed the chart and agree that the record reflects my personal performance and is accurate and complete.      DISCLAIMER: This note was prepared with [employer] voice recognition transcription software. Garbled syntax, mangled pronouns, and other bizarre constructions may be attributed to that software system.      Clinical Impression:  Final diagnoses:  [I10] Hypertension, unspecified type (Primary)  [M54.6] Acute right-sided thoracic back pain          ED Disposition Condition    Discharge Stable          ED Prescriptions       Medication Sig Dispense Start Date End Date Auth. Provider    irbesartan (AVAPRO) 300 MG tablet Take 1 tablet (300 mg total) by mouth every evening. 90 tablet 11/26/2024 11/26/2025 Gume Weber MD    tiZANidine (ZANAFLEX) 4 MG tablet Take 1 tablet (4 mg total) by mouth every 6 (six) hours as needed. 30 tablet 11/26/2024 12/6/2024 Gume Weber MD    HYDROcodone-acetaminophen (NORCO) 5-325 mg per tablet Take 1 tablet by mouth every 6 (six) hours as needed for Pain. 20 tablet 11/26/2024 12/6/2024 Gume Weber MD          Follow-up Information       Follow up With Specialties Details Why Contact Info    Geovanni Britton MD Internal  Medicine, Pediatrics   [address] [facility]  [place] [id number]  [phone]               Gume Weber MD  11/26/24 [id number]

## 2024-11-27 NOTE — ED TRIAGE NOTES
Pt presents to ER with complaints of right side flank pain. Pt states she sneezed and felt something pop in her back. Pt states she was here earlier for NVD but that has subsided. Pt rates flank pain 10/10 and says she took a BC powder and has had no relief. Pt denies any other issues at this time.

## 2025-02-26 ENCOUNTER — PATIENT MESSAGE (OUTPATIENT)
Dept: ADMINISTRATIVE | Facility: HOSPITAL | Age: 50
End: 2025-02-26
Payer: COMMERCIAL

## 2025-03-23 DIAGNOSIS — F41.9 ANXIETY: ICD-10-CM

## 2025-03-24 ENCOUNTER — PATIENT MESSAGE (OUTPATIENT)
Dept: ADMINISTRATIVE | Facility: HOSPITAL | Age: 50
End: 2025-03-24
Payer: COMMERCIAL

## 2025-03-24 NOTE — TELEPHONE ENCOUNTER
Care Due:                  Date            Visit Type   Department     Provider  --------------------------------------------------------------------------------                                [facility]                              ANNUAL       [facility]                              CHECKUP/PHY  MED/ INTERNAL  Last Visit: 03-      S            MED/ PEDS      Geovanni Britton                              [facility]                              ANNUAL       [facility]                              CHECKUP/PHY  MED/ INTERNAL  Next Visit: 04-      S            MED/ PEDRAFFI Britton                                                            Last  Test          Frequency    Reason                     Performed    Due Date  --------------------------------------------------------------------------------    CBC.........  6 months...  hydroxychloroquine.......  11- 05-    CMP.........  6 months...  hydroxychloroquine.......  11- 05-    [facility] Embedded Care Due Messages. Reference number: [id number].   3/23/2025 7:25:22 PM CDT

## 2025-03-25 RX ORDER — ESCITALOPRAM OXALATE 20 MG/1
20 TABLET ORAL NIGHTLY
Qty: 30 TABLET | Refills: 0 | Status: SHIPPED | OUTPATIENT
Start: 2025-03-25

## 2025-04-03 ENCOUNTER — PATIENT OUTREACH (OUTPATIENT)
Dept: ADMINISTRATIVE | Facility: HOSPITAL | Age: 50
End: 2025-04-03
Payer: COMMERCIAL

## 2025-04-03 DIAGNOSIS — Z12.31 SCREENING MAMMOGRAM, ENCOUNTER FOR: Primary | ICD-10-CM

## 2025-04-25 ENCOUNTER — PATIENT OUTREACH (OUTPATIENT)
Dept: ADMINISTRATIVE | Facility: HOSPITAL | Age: 50
End: 2025-04-25
Payer: COMMERCIAL

## 2025-04-30 DIAGNOSIS — F41.9 ANXIETY: ICD-10-CM

## 2025-04-30 NOTE — TELEPHONE ENCOUNTER
No care due was identified.  [facility] Embedded Care Due Messages. Reference number: [id number].   4/30/2025 5:47:44 PM CDT

## 2025-05-01 RX ORDER — ESCITALOPRAM OXALATE 20 MG/1
20 TABLET ORAL NIGHTLY
Qty: 30 TABLET | Refills: 0 | Status: SHIPPED | OUTPATIENT
Start: 2025-05-01

## 2025-05-21 ENCOUNTER — LAB VISIT (OUTPATIENT)
Dept: LAB | Facility: HOSPITAL | Age: 50
End: 2025-05-21
Attending: INTERNAL MEDICINE
Payer: COMMERCIAL

## 2025-05-21 ENCOUNTER — OFFICE VISIT (OUTPATIENT)
Dept: FAMILY MEDICINE | Facility: CLINIC | Age: 50
End: 2025-05-21
Payer: COMMERCIAL

## 2025-05-21 VITALS
SYSTOLIC BLOOD PRESSURE: 170 MMHG | TEMPERATURE: 99 F | DIASTOLIC BLOOD PRESSURE: 100 MMHG | OXYGEN SATURATION: 98 % | BODY MASS INDEX: 51.01 KG/M2 | HEART RATE: 75 BPM | HEIGHT: 60 IN | WEIGHT: 259.81 LBS

## 2025-05-21 DIAGNOSIS — F41.9 ANXIETY: ICD-10-CM

## 2025-05-21 DIAGNOSIS — Z83.49 FAMILY HISTORY OF THYROID DISEASE: ICD-10-CM

## 2025-05-21 DIAGNOSIS — E66.813 CLASS 3 OBESITY: ICD-10-CM

## 2025-05-21 DIAGNOSIS — I10 ESSENTIAL HYPERTENSION: ICD-10-CM

## 2025-05-21 DIAGNOSIS — Z59.41 FOOD INSECURITY: ICD-10-CM

## 2025-05-21 DIAGNOSIS — Z12.31 SCREENING MAMMOGRAM, ENCOUNTER FOR: ICD-10-CM

## 2025-05-21 DIAGNOSIS — L40.9 PSORIASIS: ICD-10-CM

## 2025-05-21 DIAGNOSIS — M06.09 RHEUMATOID ARTHRITIS OF MULTIPLE SITES WITH NEGATIVE RHEUMATOID FACTOR: ICD-10-CM

## 2025-05-21 DIAGNOSIS — Z12.11 COLON CANCER SCREENING: ICD-10-CM

## 2025-05-21 DIAGNOSIS — Z00.00 ROUTINE ADULT HEALTH MAINTENANCE: Primary | ICD-10-CM

## 2025-05-21 LAB
ABSOLUTE EOSINOPHIL (OHS): 0.17 K/UL
ABSOLUTE MONOCYTE (OHS): 1.07 K/UL (ref 0.3–1)
ABSOLUTE NEUTROPHIL COUNT (OHS): 8.97 K/UL (ref 1.8–7.7)
ALBUMIN SERPL BCP-MCNC: 3 G/DL (ref 3.5–5.2)
ALP SERPL-CCNC: 77 UNIT/L (ref 40–150)
ALT SERPL W/O P-5'-P-CCNC: 14 UNIT/L (ref 10–44)
ANION GAP (OHS): 11 MMOL/L (ref 8–16)
AST SERPL-CCNC: 17 UNIT/L (ref 11–45)
BASOPHILS # BLD AUTO: 0.07 K/UL
BASOPHILS NFR BLD AUTO: 0.6 %
BILIRUB SERPL-MCNC: 0.5 MG/DL (ref 0.1–1)
BUN SERPL-MCNC: 9 MG/DL (ref 6–20)
CALCIUM SERPL-MCNC: 8.8 MG/DL (ref 8.7–10.5)
CHLORIDE SERPL-SCNC: 106 MMOL/L (ref 95–110)
CHOLEST SERPL-MCNC: 148 MG/DL (ref 120–199)
CHOLEST/HDLC SERPL: 4 {RATIO} (ref 2–5)
CO2 SERPL-SCNC: 23 MMOL/L (ref 23–29)
CREAT SERPL-MCNC: 0.8 MG/DL (ref 0.5–1.4)
CYCLIC CITRULLINATED PEPTIDE (CCP) (OHS): 140.1 U/ML
ERYTHROCYTE [DISTWIDTH] IN BLOOD BY AUTOMATED COUNT: 16.6 % (ref 11.5–14.5)
GFR SERPLBLD CREATININE-BSD FMLA CKD-EPI: >60 ML/MIN/1.73/M2
GLUCOSE SERPL-MCNC: 73 MG/DL (ref 70–110)
HCT VFR BLD AUTO: 40.3 % (ref 37–48.5)
HDLC SERPL-MCNC: 37 MG/DL (ref 40–75)
HDLC SERPL: 25 % (ref 20–50)
HGB BLD-MCNC: 12.5 GM/DL (ref 12–16)
IMM GRANULOCYTES # BLD AUTO: 0.09 K/UL (ref 0–0.04)
IMM GRANULOCYTES NFR BLD AUTO: 0.7 % (ref 0–0.5)
LDLC SERPL CALC-MCNC: 94 MG/DL (ref 63–159)
LYMPHOCYTES # BLD AUTO: 1.95 K/UL (ref 1–4.8)
MCH RBC QN AUTO: 24.1 PG (ref 27–31)
MCHC RBC AUTO-ENTMCNC: 31 G/DL (ref 32–36)
MCV RBC AUTO: 78 FL (ref 82–98)
NONHDLC SERPL-MCNC: 111 MG/DL
NUCLEATED RBC (/100WBC) (OHS): 0 /100 WBC
PLATELET # BLD AUTO: 359 K/UL (ref 150–450)
PMV BLD AUTO: 10 FL (ref 9.2–12.9)
POTASSIUM SERPL-SCNC: 4.2 MMOL/L (ref 3.5–5.1)
PROT SERPL-MCNC: 8 GM/DL (ref 6–8.4)
RBC # BLD AUTO: 5.19 M/UL (ref 4–5.4)
RELATIVE EOSINOPHIL (OHS): 1.4 %
RELATIVE LYMPHOCYTE (OHS): 15.8 % (ref 18–48)
RELATIVE MONOCYTE (OHS): 8.7 % (ref 4–15)
RELATIVE NEUTROPHIL (OHS): 72.8 % (ref 38–73)
RHEUMATOID FACT SERPL-ACNC: 62 IU/ML
SODIUM SERPL-SCNC: 140 MMOL/L (ref 136–145)
TRIGL SERPL-MCNC: 85 MG/DL (ref 30–150)
TSH SERPL-ACNC: 0.79 UIU/ML (ref 0.4–4)
WBC # BLD AUTO: 12.32 K/UL (ref 3.9–12.7)

## 2025-05-21 PROCEDURE — 3077F SYST BP >= 140 MM HG: CPT | Mod: CPTII,S$GLB,, | Performed by: INTERNAL MEDICINE

## 2025-05-21 PROCEDURE — 84443 ASSAY THYROID STIM HORMONE: CPT

## 2025-05-21 PROCEDURE — 86200 CCP ANTIBODY: CPT

## 2025-05-21 PROCEDURE — 99214 OFFICE O/P EST MOD 30 MIN: CPT | Mod: 25,S$GLB,, | Performed by: INTERNAL MEDICINE

## 2025-05-21 PROCEDURE — 4010F ACE/ARB THERAPY RXD/TAKEN: CPT | Mod: CPTII,S$GLB,, | Performed by: INTERNAL MEDICINE

## 2025-05-21 PROCEDURE — 1159F MED LIST DOCD IN RCRD: CPT | Mod: CPTII,S$GLB,, | Performed by: INTERNAL MEDICINE

## 2025-05-21 PROCEDURE — 99396 PREV VISIT EST AGE 40-64: CPT | Mod: S$GLB,,, | Performed by: INTERNAL MEDICINE

## 2025-05-21 PROCEDURE — 80053 COMPREHEN METABOLIC PANEL: CPT

## 2025-05-21 PROCEDURE — 99999 PR PBB SHADOW E&M-EST. PATIENT-LVL IV: CPT | Mod: PBBFAC,,, | Performed by: INTERNAL MEDICINE

## 2025-05-21 PROCEDURE — 36415 COLL VENOUS BLD VENIPUNCTURE: CPT | Mod: PO

## 2025-05-21 PROCEDURE — 80061 LIPID PANEL: CPT

## 2025-05-21 PROCEDURE — 86431 RHEUMATOID FACTOR QUANT: CPT

## 2025-05-21 PROCEDURE — 3080F DIAST BP >= 90 MM HG: CPT | Mod: CPTII,S$GLB,, | Performed by: INTERNAL MEDICINE

## 2025-05-21 PROCEDURE — G0136 PR ADMINISTRATION, SOCIAL DETERMINANT ASSESSMNT, 5-15 MIN: HCPCS | Mod: S$GLB,,, | Performed by: INTERNAL MEDICINE

## 2025-05-21 PROCEDURE — 3008F BODY MASS INDEX DOCD: CPT | Mod: CPTII,S$GLB,, | Performed by: INTERNAL MEDICINE

## 2025-05-21 PROCEDURE — 85025 COMPLETE CBC W/AUTO DIFF WBC: CPT

## 2025-05-21 RX ORDER — NIFEDIPINE 30 MG/1
30 TABLET, EXTENDED RELEASE ORAL DAILY
Qty: 90 TABLET | Refills: 0 | Status: SHIPPED | OUTPATIENT
Start: 2025-05-21 | End: 2026-05-21

## 2025-05-21 RX ORDER — IRBESARTAN 300 MG/1
300 TABLET ORAL NIGHTLY
Qty: 90 TABLET | Refills: 3 | Status: SHIPPED | OUTPATIENT
Start: 2025-05-21 | End: 2026-05-21

## 2025-05-21 RX ORDER — SEMAGLUTIDE 0.25 MG/.5ML
0.25 INJECTION, SOLUTION SUBCUTANEOUS
Qty: 2 ML | Refills: 0 | Status: SHIPPED | OUTPATIENT
Start: 2025-05-21

## 2025-05-21 RX ORDER — ESCITALOPRAM OXALATE 20 MG/1
30 TABLET ORAL DAILY
Qty: 135 TABLET | Refills: 0 | Status: SHIPPED | OUTPATIENT
Start: 2025-05-21

## 2025-05-21 SDOH — SOCIAL DETERMINANTS OF HEALTH (SDOH): FOOD INSECURITY: Z59.41

## 2025-05-22 ENCOUNTER — PATIENT MESSAGE (OUTPATIENT)
Dept: FAMILY MEDICINE | Facility: CLINIC | Age: 50
End: 2025-05-22
Payer: COMMERCIAL

## 2025-05-23 ENCOUNTER — PATIENT MESSAGE (OUTPATIENT)
Dept: FAMILY MEDICINE | Facility: CLINIC | Age: 50
End: 2025-05-23
Payer: COMMERCIAL

## 2025-05-25 ENCOUNTER — RESULTS FOLLOW-UP (OUTPATIENT)
Dept: FAMILY MEDICINE | Facility: CLINIC | Age: 50
End: 2025-05-25

## 2025-05-25 NOTE — PROGRESS NOTES
Subjective:     History of Present Illness    CHIEF COMPLAINT:  - Asha presents with multiple concerns including worsening joint pain, uncontrolled hypertension, and persistent anxiety despite current medication.    HPI:  Asha reports worsening joint pain, particularly in knees and hips. She describes constant, progressively worsening knee pain, with difficulty fully flexing and crepitus. Hip pain occurs especially with external rotation. She reports daily limping of varying severity and has considered using a cane. She uses OTC medicated patches containing 7.5% alcohol for temporary relief. The pain causes fatigue and lack of motivation.    Blood pressure remains uncontrolled while taking two medications (Irbesartan and Metoprolol). Asha was evaluated at the ER in November due to very high blood pressure (200/115) and flank pain. She expresses concern about her blood pressure and acknowledges the need for better control.    Although current medication (Lexapro) has helped with anxiety, it is not completely controlled. The anxiety is intermittent throughout the day without panic attacks.    Asha mentions psoriasis affecting her face and scalp, treated by a dermatologist. She expresses dissatisfaction with her current dermatology care.    Asha denies chest pain, breathing issues, abdominal pain, constipation, nausea, and vomiting.    MEDICATIONS:  - Irbesartan, taken at night for blood pressure  - Metoprolol, taken at night for blood pressure  - Lexapro, taken in the morning for anxiety  - Zyrtec, taken for allergies  - Tylenol, taken as needed for pain  - Absorbing Mike patches (7.5% alcohol), applied to knees for pain relief  - Discontinued Plaquenil for rheumatoid arthritis due to lack of effectiveness    MEDICAL HISTORY:  - Rheumatoid arthritis: Diagnosed in 2022  - Hypertension  - Psoriasis: Affecting face and scalp  - Pulmonary embolism: Past occurrence  - Anxiety    FAMILY HISTORY:  -  Father: Rheumatoid arthritis, psoriasis  - Mother: Osteoarthritis  - Twin sister: Thyroid disease, thyroid surgery about a year ago  - Other sister: Recent kidney stones  - Maternal grandmother: Unspecified cancer    LABS:  - Rheumatoid factor: 14  - CCP antibody: 368 (normal is less than 5)    IMAGING:  - Arthritis survey: 3 years ago, no findings on shoulders    ALLERGIES:  - Ibuprofen: allergy (specific reaction not mentioned)    SOCIAL HISTORY:  - Alcohol: Social drinker  Smoking: Denies  - Employed      ROS:  General: +fatigue, +loss of energy, +decreased energy levels  Cardiovascular: -chest pain  Gastrointestinal: -abdominal pain, -nausea, -vomiting, -constipation  Musculoskeletal: +joint pain, +difficulty walking, +joint stiffness, +muscle stiffness  Skin: +lesion  Psychiatric: +anxiety  Allergic: +seasonal allergies         Objective:     Vitals:    05/21/25 1542   BP: (!) 170/100   Pulse: 75   Temp: 98.5 °F (36.9 °C)       Physical Exam    Respiratory: Lungs clear.  Musculoskeletal: Active hip external rotation limited (Left). Active hip external rotation limited (Right). Enlargement of DIP joints in hands.  Neck: Thyroid gland not enlarged.         Assessment/Plan     1. Routine adult health maintenance        2. Class 3 obesity  semaglutide, weight loss, (WEGOVY) 0.25 mg/0.5 mL PnIj    Lipid Panel      3. Essential hypertension  NIFEdipine (PROCARDIA-XL) 30 MG (OSM) 24 hr tablet    irbesartan (AVAPRO) 300 MG tablet      4. Anxiety  EScitalopram oxalate (LEXAPRO) 20 MG tablet      5. Rheumatoid arthritis of multiple sites with negative rheumatoid factor  Ambulatory referral/consult to Rheumatology    X-ray Arthritis Survey    Rheumatoid Factor    Cyclic Citrullinated Peptide Antibody, IgG    CBC Auto Differential    Comprehensive Metabolic Panel      6. Family history of thyroid disease  TSH      7. Screening mammogram, encounter for  Mammo Digital Screening Bilat      8. Colon cancer screening  Cologuard  Screening (Multitarget Stool DNA)    Cologuard Screening (Multitarget Stool DNA)      9. Food insecurity  [facility] access hospital PROGRAM ENROLLMENT          Assessment & Plan   Asha Paige was seen today for routine physical. Separate E/M Code for acute conditions discussed during today's routine physical examination have been documented in the assessment and plan below.     Discontinued Metoprolol due to inadequate BP control at current dose.   Started Nifedipine 30 mg at night for improved BP management   Continued Irbesartan for BP management.   Increased Lexapro to 30 mg daily (1.5 tablets) for better anxiety control.   Started Wegovy (pending insurance approval) for weight management.   Evaluated rheumatoid arthritis symptoms and XR Arthritis survey to assess joint inflammation, labs.   Ordered thyroid function tests due to family history of thyroid disease in twin sister.   Ordered lipid panel.   Explained options for colorectal cancer screening, including Cologuard home test.    RHEUMATOID ARTHRITIS:  Asha reports constant pain and lack of energy, with knees being the most painful joints.  Asha limps daily, with some days worse than others.  Hips sometimes hurt, especially with external rotation, affecting walking.  Hands are sometimes stiff, but no significant pain in wrists or elbows.  No back pain reported.  Labs show signs of inflammation over the last couple of years with rheumatoid factor of 14 and strongly positive CCP antibody at 368 (normal <5).  Ordered XR Arthritis survey to assess joint inflammation and updated rheumatoid labs.  Referred to rheumatology for evaluation and management of possible rheumatoid/psoriatic arthritis with potential e-consult to be requested prior to in-person visit depending upon timeliness of Rheumatology evaluation    HYPERTENSION:  Blood pressure remains severely elevated despite current medication regimen, with readings of 200/115 and 230/235 during November ER  visit.  Current medications include Irbesartan and Metoprolol.  Discontinued Metoprolol due to inadequate BP control at current dose.  Started Nifedipine 30 mg at night for improved BP management  Continued Irbesartan.  Advised patient to obtain a home BP monitor for regular checks.  BP follow-up scheduled in 1 month to reassess BP control.    PSORIASIS:  Asha reports psoriasis on face and scalp, though dermatologist previously diagnosed it as eczema.  Asha's father also had psoriasis.  Observed hyper-pigmented patches on face and scalp consistent with psoriasis.  Condition may be related to joint pain, suggesting possible psoriatic arthritis.  Referred to rheumatology for evaluation and management with e-consult requested prior to in-person visit.  Will consult with rheumatologist to explore treatment options that address both psoriasis and joint pain.    HIP PAIN:  Asha reports hip pain, especially with external rotation, affecting walking.  Pain is deep in the hip area on both sides and significant enough to cause limping and affect da  stefanie activities.  Ordered XR Arthritis survey to assess joint inflammation.  Referred to rheumatology for evaluation and management.  Suggested potential treatment options like methotrexate to manage joint pain.    KNEE PAIN:  Asha reports significant pain in both knees that is constant and affects daily activities, causing limping.  Knees are not swollen, but pain is significant.  Ordered XR Arthritis survey to assess joint inflammation.  Referred to rheumatology for evaluation and management.    ANXIETY DISORDER:  Asha reports feeling anxious, though not as severe as before starting medication.  Anxiety is not constant throughout the day and no panic attacks reported.  Increased Lexapro to 30 mg daily (1.5 tablets) for better anxiety control.  Arranged for pharmacy assistance to cut pills if needed.    HISTORY OF PULMONARY EMBOLISM:  Asha has history of  pulmonary embolism that required daily injections, which caused bruising.  Asha is not scared of injections due to this past experience.      OBESITY  WEIGHT MANAGEMENT AND PREVENTIVE CARE:  Started Wegovy (pending insurance approval) for weight management.  Ordered lipid panel.  Asha to schedule mammogram for breast cancer screening and complete Cologuard test for colorectal cancer screening when received.  Asha instructed to contact office if any issues arise with new medications.         This note was generated with the assistance of ambient listening technology. Verbal consent was obtained by the patient and accompanying visitor(s) for the recording of patient appointment to facilitate this note. I attest to having reviewed and edited the generated note for accuracy, though some syntax or spelling errors may persist. Please contact the author of this note for any clarification.      Geovanni Britton MD  Internal Medicine-Pediatrics    [facility] Intervention:     As of today, the patient has reported risk in the following areas:    Food Insecurity: Food Insecurity Present (5/14/2025)    Hunger Vital Sign     Worried About Running Out of Food in the Last Year: Never true     Ran Out of Food in the Last Year: Sometimes true     Enrollment order placed to Community Health Worker.  Spent approximately 5 minutes assessing social needs impacting patient care.

## 2025-05-27 ENCOUNTER — PATIENT OUTREACH (OUTPATIENT)
Dept: ADMINISTRATIVE | Facility: OTHER | Age: 50
End: 2025-05-27
Payer: COMMERCIAL

## 2025-05-27 NOTE — PROGRESS NOTES
CHW - Case Closure    This Community Health Worker spoke to patient via telephone today.   Pt/Caregiver reported: she did not ask for food assistance  Pt/Caregiver denied any additional needs at this time and agrees with episode closure at this time.  Provided patient with Community Health Worker's contact information and encouraged him/her to contact this Community Health Worker if additional needs arise.

## 2025-05-29 ENCOUNTER — PATIENT MESSAGE (OUTPATIENT)
Dept: FAMILY MEDICINE | Facility: CLINIC | Age: 50
End: 2025-05-29
Payer: COMMERCIAL

## 2025-05-29 DIAGNOSIS — M06.09 RHEUMATOID ARTHRITIS OF MULTIPLE SITES WITH NEGATIVE RHEUMATOID FACTOR: Primary | ICD-10-CM

## 2025-05-31 ENCOUNTER — HOSPITAL ENCOUNTER (OUTPATIENT)
Dept: RADIOLOGY | Facility: HOSPITAL | Age: 50
Discharge: HOME | End: 2025-05-31
Attending: INTERNAL MEDICINE
Payer: COMMERCIAL

## 2025-05-31 DIAGNOSIS — M06.09 RHEUMATOID ARTHRITIS OF MULTIPLE SITES WITH NEGATIVE RHEUMATOID FACTOR: ICD-10-CM

## 2025-05-31 PROCEDURE — 77077 JOINT SURVEY SINGLE VIEW: CPT | Mod: TC

## 2025-05-31 PROCEDURE — 77077 JOINT SURVEY SINGLE VIEW: CPT | Mod: 26,,, | Performed by: RADIOLOGY

## 2025-06-02 ENCOUNTER — TELEPHONE (OUTPATIENT)
Dept: FAMILY MEDICINE | Facility: CLINIC | Age: 50
End: 2025-06-02
Payer: COMMERCIAL

## 2025-06-02 RX ORDER — DICLOFENAC SODIUM 50 MG/1
50 TABLET, DELAYED RELEASE ORAL 2 TIMES DAILY PRN
Qty: 60 TABLET | Refills: 1 | Status: SHIPPED | OUTPATIENT
Start: 2025-06-02

## 2025-06-09 ENCOUNTER — PATIENT MESSAGE (OUTPATIENT)
Dept: FAMILY MEDICINE | Facility: CLINIC | Age: 50
End: 2025-06-09
Payer: COMMERCIAL

## 2025-06-09 ENCOUNTER — PATIENT MESSAGE (OUTPATIENT)
Dept: ADMINISTRATIVE | Facility: HOSPITAL | Age: 50
End: 2025-06-09
Payer: COMMERCIAL

## 2025-06-16 ENCOUNTER — RESULTS FOLLOW-UP (OUTPATIENT)
Dept: RHEUMATOLOGY | Facility: CLINIC | Age: 50
End: 2025-06-16

## 2025-06-16 ENCOUNTER — OFFICE VISIT (OUTPATIENT)
Dept: RHEUMATOLOGY | Facility: CLINIC | Age: 50
End: 2025-06-16
Payer: COMMERCIAL

## 2025-06-16 ENCOUNTER — LAB VISIT (OUTPATIENT)
Dept: LAB | Facility: HOSPITAL | Age: 50
End: 2025-06-16
Payer: COMMERCIAL

## 2025-06-16 VITALS
WEIGHT: 262.38 LBS | BODY MASS INDEX: 51.51 KG/M2 | HEIGHT: 60 IN | DIASTOLIC BLOOD PRESSURE: 100 MMHG | SYSTOLIC BLOOD PRESSURE: 165 MMHG | HEART RATE: 92 BPM

## 2025-06-16 DIAGNOSIS — D84.821 IMMUNOSUPPRESSION DUE TO DRUG THERAPY: ICD-10-CM

## 2025-06-16 DIAGNOSIS — Z79.899 IMMUNOSUPPRESSION DUE TO DRUG THERAPY: ICD-10-CM

## 2025-06-16 DIAGNOSIS — M06.09 RHEUMATOID ARTHRITIS OF MULTIPLE SITES WITH NEGATIVE RHEUMATOID FACTOR: ICD-10-CM

## 2025-06-16 DIAGNOSIS — M19.90 OSTEOARTHRITIS, UNSPECIFIED OSTEOARTHRITIS TYPE, UNSPECIFIED SITE: ICD-10-CM

## 2025-06-16 DIAGNOSIS — L40.9 PSORIASIS: ICD-10-CM

## 2025-06-16 DIAGNOSIS — M06.09 RHEUMATOID ARTHRITIS OF MULTIPLE SITES WITH NEGATIVE RHEUMATOID FACTOR: Primary | ICD-10-CM

## 2025-06-16 LAB
CRP SERPL-MCNC: 67.6 MG/L
ERYTHROCYTE [SEDIMENTATION RATE] IN BLOOD BY PHOTOMETRIC METHOD: 65 MM/HR
HAV AB SER QL IA: NORMAL
HBV CORE AB SERPL QL IA: NORMAL
HBV SURFACE AB SER-ACNC: <3 MIU/ML
HBV SURFACE AB SERPL IA-ACNC: NORMAL M[IU]/ML
HBV SURFACE AG SERPL QL IA: NORMAL
HCV AB SERPL QL IA: NORMAL
HIV 1+2 AB+HIV1 P24 AG SERPL QL IA: NORMAL
T PALLIDUM IGG+IGM SER QL: NORMAL

## 2025-06-16 PROCEDURE — 99999 PR PBB SHADOW E&M-EST. PATIENT-LVL V: CPT | Mod: PBBFAC,,,

## 2025-06-16 PROCEDURE — 86704 HEP B CORE ANTIBODY TOTAL: CPT

## 2025-06-16 PROCEDURE — 86593 SYPHILIS TEST NON-TREP QUANT: CPT

## 2025-06-16 PROCEDURE — 1159F MED LIST DOCD IN RCRD: CPT | Mod: CPTII,S$GLB,,

## 2025-06-16 PROCEDURE — 86706 HEP B SURFACE ANTIBODY: CPT

## 2025-06-16 PROCEDURE — 87340 HEPATITIS B SURFACE AG IA: CPT

## 2025-06-16 PROCEDURE — 86140 C-REACTIVE PROTEIN: CPT

## 2025-06-16 PROCEDURE — G2211 COMPLEX E/M VISIT ADD ON: HCPCS | Mod: S$GLB,,,

## 2025-06-16 PROCEDURE — 86480 TB TEST CELL IMMUN MEASURE: CPT

## 2025-06-16 PROCEDURE — 3008F BODY MASS INDEX DOCD: CPT | Mod: CPTII,S$GLB,,

## 2025-06-16 PROCEDURE — 87389 HIV-1 AG W/HIV-1&-2 AB AG IA: CPT

## 2025-06-16 PROCEDURE — 86790 VIRUS ANTIBODY NOS: CPT

## 2025-06-16 PROCEDURE — 85652 RBC SED RATE AUTOMATED: CPT

## 2025-06-16 PROCEDURE — 36415 COLL VENOUS BLD VENIPUNCTURE: CPT

## 2025-06-16 PROCEDURE — 3077F SYST BP >= 140 MM HG: CPT | Mod: CPTII,S$GLB,,

## 2025-06-16 PROCEDURE — 86682 HELMINTH ANTIBODY: CPT

## 2025-06-16 PROCEDURE — 1160F RVW MEDS BY RX/DR IN RCRD: CPT | Mod: CPTII,S$GLB,,

## 2025-06-16 PROCEDURE — 4010F ACE/ARB THERAPY RXD/TAKEN: CPT | Mod: CPTII,S$GLB,,

## 2025-06-16 PROCEDURE — 3080F DIAST BP >= 90 MM HG: CPT | Mod: CPTII,S$GLB,,

## 2025-06-16 PROCEDURE — 86803 HEPATITIS C AB TEST: CPT

## 2025-06-16 PROCEDURE — 86787 VARICELLA-ZOSTER ANTIBODY: CPT

## 2025-06-16 PROCEDURE — 99214 OFFICE O/P EST MOD 30 MIN: CPT | Mod: S$GLB,,,

## 2025-06-16 RX ORDER — CELECOXIB 200 MG/1
200 CAPSULE ORAL 2 TIMES DAILY PRN
Qty: 60 CAPSULE | Refills: 1 | Status: SHIPPED | OUTPATIENT
Start: 2025-06-16

## 2025-06-16 RX ORDER — ADALIMUMAB 40MG/0.4ML
40 KIT SUBCUTANEOUS
Qty: 2 PEN | Refills: 3 | Status: SHIPPED | OUTPATIENT
Start: 2025-06-16 | End: 2025-06-16 | Stop reason: SDUPTHER

## 2025-06-16 RX ORDER — ADALIMUMAB-ADAZ 40 MG/.4ML
40 INJECTION, SOLUTION SUBCUTANEOUS
Qty: 2 PEN | Refills: 2 | Status: ACTIVE | OUTPATIENT
Start: 2025-06-16

## 2025-06-16 NOTE — PROGRESS NOTES
Subjective:      Patient ID: Asha Paige is a 49 y.o. female.    Chief Complaint: Follow up on RA    HPI  Asha Paige is a 50yo female with a h/o HTN, anemia, pulmonary embolism, psoriasis, anemia and anxiety who presents for follow up on seropositive (+RF, +CCP) rheumatoid arthritis.  Previously being followed by Dr. Damian, initially in 2022.    Family h/o father with RA.    2025:  RF 62  CCP (2022) 368.4, (2025) 140.1    She has h/o psoriasis. No longer seeing dermatology. Face, chest, ears, neck, scalp, patches on legs.  No episodes of eye inflammation. Some episodes of diarrhea 2wks out of the month, every month.     Now with extreme pain in knees. Bad with rest, and also with activity.  Xray of B knees May 2025 with tibiofemoral joint space narrowing.  Steroid injection x 1 in B knees in the past, did not help.  Some AM swelling of ankles/feet.  She attempted to start Wegovy for weight loss, however insurance denied.    Previously on Plaquenil 400mg from Dr. Damian, for about 1 year.  Stopped taking this since it wasn't helping    Failed:  Aleve  Meloxicam   diclofenac    Taking tylenol arthritis PRN daily, doesn't help.    Review of Systems   Constitutional:  Negative for fatigue and fever.   Eyes:  Negative for photophobia, pain, redness, itching and visual disturbance.   Respiratory:  Negative for cough and shortness of breath.    Cardiovascular:  Negative for chest pain.   Gastrointestinal:  Positive for diarrhea.   Musculoskeletal:  Positive for arthralgias and joint swelling. Negative for myalgias.   Skin:  Positive for color change and rash.   Neurological:  Negative for light-headedness and numbness.   Psychiatric/Behavioral:  Negative for behavioral problems and confusion.         Objective:   BP (!) 165/100 (BP Location: Left arm, Patient Position: Sitting)   Pulse 92   Ht 5' (1.524 m)   Wt 119 kg (262 lb 5.6 oz)   BMI 51.24 kg/m²   Physical Exam   Constitutional: She is oriented to person,  place, and time. normal appearance.   Cardiovascular: Normal rate and regular rhythm.   Pulmonary/Chest: Effort normal and breath sounds normal.   Musculoskeletal:         General: Tenderness present. No swelling.      Comments: Tenderness in B knees and ankles  Joints of hands and feet without tenderness or synovitis   Neurological: She is alert and oriented to person, place, and time.   Skin: Skin is warm and dry.   Psychiatric: Her behavior is normal. Mood normal.                      Assessment:     1. Rheumatoid arthritis of multiple sites with negative rheumatoid factor    2. Psoriasis    3. Osteoarthritis, unspecified osteoarthritis type, unspecified site    4. Immunosuppression due to drug therapy          Plan:     Problem List Items Addressed This Visit       Psoriasis    Relevant Medications    adalimumab (HUMIRA,CF, PEN) 40 mg/0.4 mL PnKt    Other Relevant Orders    Ambulatory referral/consult to Dermatology     Other Visit Diagnoses         Rheumatoid arthritis of multiple sites with negative rheumatoid factor    -  Primary    Relevant Medications    adalimumab (HUMIRA,CF, PEN) 40 mg/0.4 mL PnKt    Other Relevant Orders    C-Reactive Protein    Sedimentation rate      Osteoarthritis, unspecified osteoarthritis type, unspecified site        Relevant Medications    celecoxib (CELEBREX) 200 MG capsule    Other Relevant Orders    Ambulatory referral/consult to Orthopedics      Immunosuppression due to drug therapy        Relevant Orders    HIV 1/2 Ag/Ab (4th Gen)    Hepatitis B surface antigen    HBcAB    Hepatitis B surface antibody    Hepatitis C antibody    Hepatitis A antibody, IgG    Strongyloides IgG Antibodies    Quantiferon Gold TB    Treponema Pallidium Antibodies IgG, IgM    Varicella zoster antibody, IgG        Check DMARD labs + inflammation markers today  Start celebrex 200mg BID for OA of knees, only to be taken if Bps remain stable  Start humira 40mg every 14 days, for psorasis + possible  inflammatory joint pain.  Refer now to dermatology.  Refer to orthopedics for Bilateral knee OA.  Refer to nutrition services.   Recommend Mediterranean diet.    Advised to check BP regularly at home, and if they are continuously elevated then follow up with PCP. Advised if pt notices severe headache, nausea/vomiting, SOB, chest pain then they may be having a hypertensive emergency and report to ER immediately. Pt is aware and understands.    RTO 3mos

## 2025-06-17 PROBLEM — M06.09 RHEUMATOID ARTHRITIS OF MULTIPLE SITES WITH NEGATIVE RHEUMATOID FACTOR: Status: ACTIVE | Noted: 2025-06-17

## 2025-06-17 LAB
MITOGEN MINUS NIL (OHS): 9.82
NIL TB SYNCED (OHS): 0.04
QUANTIFERON GOLD INTERP (OHS): NEGATIVE
TB1 AG MINUS NIL (OHS): 0.02
TB2 AG MINUS NIL (OHS): 0.05
V.ZOSTER IGG INTERP (OHS): POSITIVE
VARICELLA ZOSTER IGG (OHS): 18 S/CO

## 2025-06-18 LAB — STRONGYLOIDES IGG SER QL IA: NEGATIVE

## 2025-06-21 DIAGNOSIS — I10 ESSENTIAL HYPERTENSION: ICD-10-CM

## 2025-06-21 RX ORDER — METOPROLOL SUCCINATE 100 MG/1
TABLET, EXTENDED RELEASE ORAL
Qty: 90 TABLET | Refills: 3 | OUTPATIENT
Start: 2025-06-21

## 2025-06-21 NOTE — TELEPHONE ENCOUNTER
Care Due:                  Date            Visit Type   Department     Provider  --------------------------------------------------------------------------------                                CHER Jim         [facility] FAMILY MED                              PRIMARY      / INTERNAL MED  Last Visit: 05-      CARE ([facility])   / GELACIO Britton  Next Visit: None Scheduled  None         None Found                                                            Last  Test          Frequency    Reason                     Performed    Due Date  --------------------------------------------------------------------------------    HBA1C.......  6 months...  semaglutide,.............  03- 09-    [facility] Embedded Care Due Messages. Reference number: [id number].   6/21/2025 3:55:55 AM CDT

## 2025-06-21 NOTE — TELEPHONE ENCOUNTER
Refill Decision Note  Kendra PLEITEZ. Inappropriate Request     Asha Paige  is requesting a refill authorization.  Brief Assessment and Rationale for Refill:  Quick Discontinue     Medication Therapy Plan:  prescription was discontinued on 11/12/2024 by Dylan Lizama MD.    Medication Reconciliation Completed: No   Comments:      Provider Staff:  Action required for this patient    Requires labs      Please see care gap opportunities below in Care Due Message.    Thanks!  [facility]     Appointments      Date Provider   Last Visit   5/21/2025 Geovanni Britton MD   Next Visit   Visit date not found Geovanni Britton MD          Note composed:11:21 AM 06/21/2025

## 2025-06-23 ENCOUNTER — PATIENT MESSAGE (OUTPATIENT)
Dept: FAMILY MEDICINE | Facility: CLINIC | Age: 50
End: 2025-06-23
Payer: COMMERCIAL

## 2025-06-23 DIAGNOSIS — M25.562 BILATERAL KNEE PAIN: Primary | ICD-10-CM

## 2025-06-23 DIAGNOSIS — M25.561 BILATERAL KNEE PAIN: Primary | ICD-10-CM

## 2025-06-23 NOTE — PROGRESS NOTES
Assessment: 49 y.o. female with Bilateral  knee osteoarthritis     We discussed the findings on xray and clinical exam as well as the natural history of arthritis.      Plan:   - Euflexxa pre auth submitted  - RTC 3 weeks        All questions were answered in detail. The patient is in full agreement with the treatment plan and will proceed accordingly.    A note notifying Dr. Geovanni Britton of my findings was sent via the electronic medical record     Chief Complaint   Patient presents with    Right Knee - Pain    Left Knee - Pain       This patient was seen in consultation at the request of Dr. Geovanni Britton     HPI (06/23/2025): Asha Paige is a 49 y.o. female who presents today complaining of bilateral knee pain     Asha presents with bilateral knee pain ongoing for several years. Pain has recently improved in stiffness and inflammation after starting Humira, with her first dose taken on Saturday. She reports improved knee flexibility and reduced pain intensity. However, pain persists due to her weight putting pressure on her knees. She has been diagnosed with osteoarthritis in both knees.    Treatment history includes steroid injections in the past, which were ineffective. She was seen by her provider on the 16th, who referred her for gel shots. Her last visit was with a rheumatologist who recently prescribed Humira for her condition.    History of OA and RA  No relief with CSI in the past    This is the extent of the patient's complaints at this time.         Review of patient's allergies indicates:   Allergen Reactions    Ibuprofen Swelling and Other (See Comments)     Swollen lips and knot on her head    Lisinopril Other (See Comments)     Angioedema       Current Medications[1]    Past Medical History:   Diagnosis Date    Allergy     Angio-edema     Eczema     History of pulmonary embolism 2005    Hypertension     Urticaria        Problem List[2]    Past Surgical History:   Procedure Laterality  Date    FRACTURE SURGERY      Plate in right arm       VAGINAL DELIVERY         Social History[3]    Family History   Problem Relation Name Age of Onset    Allergic rhinitis Mother      Osteoarthritis Mother      Stroke Father Ruben barr     Rheum arthritis Father Ruben barr     Thyroid disease Sister      Nephrolithiasis Sister      Nephrolithiasis Sister      Cancer Maternal Grandmother Nery barr     Breast cancer Neg Hx      Ovarian cancer Neg Hx         Physical Exam:   Vitals:    06/30/25 1449   PainSc:   5   PainLoc: Knee       General: Patient is alert, awake and oriented to time, place and person. Mood and affect are appropriate.  Patient does not appear to be in any distress, denies any constitutional symptoms and appears stated age.   HEENT: Pupils are equal and round, sclera are not injected. External examination of ears and nose reveals no abnormalities. Cranial nerves II-X are grossly intact  Skin: no rashes, abrasions or open wounds on the affected extremity   Resp: No respiratory distress or audible wheezing   CV: 2+  pulses, all extremities warm and well perfused     Bilateral  knee(s)  Localizes pain over MJl bilaterally  No swelling, effusion, or erythema   Active ROM: 0 - 120   Tender to palpation over medial joint line   good  quadricep muscle tone and bulk; no atrophy compared to contralateral extremity   normal (0 - 2 mm) Anterior drawer   normal (0 - 2 mm) posterior drawer   negative valgus instability   negative varus instability   ltsi s/s/sp/dp/t   + ehl/fhl/ta/gs  2 + DP      Imaging:  3 views bilateral knee:  severe tricompartmental OA with subchondral sclerosis, joint space narrowing, osteophyte formation and loose bodies.  KL4    I personally reviewed and interpreted the patient's imaging obtained today in clinic     This note was created by combination of typed  and M-Modal dictation. Transcription and phonetic errors may be present.  If there are any questions, please  contact me.    Current Medications[4]          [1]   Current Outpatient Medications:     adalimumab-adaz (HYRIMOZ,CF, PEN) 40 mg/0.4 mL PnIj, Inject 0.4 mLs (40 mg total) into the skin every 14 (fourteen) days., Disp: 2 pen , Rfl: 2    adalimumab-adaz 80mg/0.8mL(x1)- 40 mg/0.4mL(x2) PnIj, Inject 1 pen (80 mg) into the skin on day 1, then 1 pen (40 mg) every 2 weeks beginning 1 week after initial dose., Disp: 3 pen , Rfl: 0    celecoxib (CELEBREX) 200 MG capsule, Take 1 capsule (200 mg total) by mouth 2 (two) times daily as needed for Pain., Disp: 60 capsule, Rfl: 1    cetirizine (ZYRTEC) 10 MG tablet, Take 1 tablet (10 mg total) by mouth once daily., Disp: 30 tablet, Rfl: 2    EScitalopram oxalate (LEXAPRO) 20 MG tablet, Take 1.5 tablets (30 mg total) by mouth once daily., Disp: 135 tablet, Rfl: 0    hydrOXYzine HCL (ATARAX) 25 MG tablet, Take 2 tablets (50 mg total) by mouth 3 (three) times daily as needed for Anxiety. (Patient not taking: Reported on 6/16/2025), Disp: 12 tablet, Rfl: 0    irbesartan (AVAPRO) 300 MG tablet, Take 1 tablet (300 mg total) by mouth every evening., Disp: 90 tablet, Rfl: 3    NIFEdipine (PROCARDIA-XL) 30 MG (OSM) 24 hr tablet, Take 1 tablet (30 mg total) by mouth once daily. (Patient not taking: Reported on 6/16/2025), Disp: 90 tablet, Rfl: 0    ondansetron (ZOFRAN-ODT) 8 MG TbDL, Take 1 tablet (8 mg total) by mouth every 8 (eight) hours as needed (Nausea). (Patient not taking: Reported on 6/16/2025), Disp: 20 tablet, Rfl: 0    semaglutide, weight loss, (WEGOVY) 0.25 mg/0.5 mL PnIj, Inject 0.25 mg into the skin every 7 days. (Patient not taking: Reported on 6/16/2025), Disp: 2 mL, Rfl: 0    trospium (SANCTURA XR) 60 mg Cp24 capsule, Take 1 capsule (60 mg total) by mouth once daily., Disp: 30 capsule, Rfl: 11  [2]   Patient Active Problem List  Diagnosis    History of pulmonary embolism    Essential hypertension    AC joint arthropathy    Class 3 obesity    Calcific tendinitis of right  shoulder    Iron deficiency anemia    Anxiety    Long-term use of Plaquenil    Mixed stress and urge urinary incontinence    Primary osteoarthritis of both knees    Psoriasis    Fecal smearing    Elongation, cervix, hypertrophic    Rheumatoid arthritis of multiple sites with negative rheumatoid factor   [3]   Social History  Tobacco Use    Smoking status: Never    Smokeless tobacco: Never   Substance Use Topics    Alcohol use: Not Currently     Comment: Social     Drug use: No   [4]   Current Outpatient Medications:     adalimumab-adaz (HYRIMOZ,CF, PEN) 40 mg/0.4 mL PnIj, Inject 0.4 mLs (40 mg total) into the skin every 14 (fourteen) days., Disp: 2 pen , Rfl: 2    adalimumab-adaz 80mg/0.8mL(x1)- 40 mg/0.4mL(x2) PnIj, Inject 1 pen (80 mg) into the skin on day 1, then 1 pen (40 mg) every 2 weeks beginning 1 week after initial dose., Disp: 3 pen , Rfl: 0    celecoxib (CELEBREX) 200 MG capsule, Take 1 capsule (200 mg total) by mouth 2 (two) times daily as needed for Pain., Disp: 60 capsule, Rfl: 1    cetirizine (ZYRTEC) 10 MG tablet, Take 1 tablet (10 mg total) by mouth once daily., Disp: 30 tablet, Rfl: 2    EScitalopram oxalate (LEXAPRO) 20 MG tablet, Take 1.5 tablets (30 mg total) by mouth once daily., Disp: 135 tablet, Rfl: 0    hydrOXYzine HCL (ATARAX) 25 MG tablet, Take 2 tablets (50 mg total) by mouth 3 (three) times daily as needed for Anxiety. (Patient not taking: Reported on 6/16/2025), Disp: 12 tablet, Rfl: 0    irbesartan (AVAPRO) 300 MG tablet, Take 1 tablet (300 mg total) by mouth every evening., Disp: 90 tablet, Rfl: 3    NIFEdipine (PROCARDIA-XL) 30 MG (OSM) 24 hr tablet, Take 1 tablet (30 mg total) by mouth once daily. (Patient not taking: Reported on 6/16/2025), Disp: 90 tablet, Rfl: 0    ondansetron (ZOFRAN-ODT) 8 MG TbDL, Take 1 tablet (8 mg total) by mouth every 8 (eight) hours as needed (Nausea). (Patient not taking: Reported on 6/16/2025), Disp: 20 tablet, Rfl: 0    semaglutide, weight loss,  (WEGOVY) 0.25 mg/0.5 mL PnIj, Inject 0.25 mg into the skin every 7 days. (Patient not taking: Reported on 6/16/2025), Disp: 2 mL, Rfl: 0    trospium (SANCTURA XR) 60 mg Cp24 capsule, Take 1 capsule (60 mg total) by mouth once daily., Disp: 30 capsule, Rfl: 11

## 2025-06-24 ENCOUNTER — TELEPHONE (OUTPATIENT)
Dept: RHEUMATOLOGY | Facility: CLINIC | Age: 50
End: 2025-06-24
Payer: COMMERCIAL

## 2025-06-24 NOTE — TELEPHONE ENCOUNTER
Copied from CRM #[id number]. Topic: General Inquiry - Test Results  >> Jun 24, 2025  4:09 PM Pavel wrote:  .Type:  Test Results    Who Called:  self     Name of Test (Lab/Mammo/Etc):  labs     Date of Test:  06/16    Ordering Provider:  mona HALL    [facility] the test [facility] performed[facility]     Would the patient rather a call back or a response via [facility]?  Both     Best Call Back Number:  [phone]      Additional Information:       For Clinical Team:Has the provider reviewed the results?

## 2025-06-24 NOTE — TELEPHONE ENCOUNTER
Pt states the call back message was intended for Dr. Britton's office to ask if they received paper work from the rheumatology department. Pt states she will reach out to his staff via [employer].

## 2025-06-26 ENCOUNTER — TELEPHONE (OUTPATIENT)
Dept: FAMILY MEDICINE | Facility: CLINIC | Age: 50
End: 2025-06-26
Payer: COMMERCIAL

## 2025-06-26 ENCOUNTER — PATIENT MESSAGE (OUTPATIENT)
Dept: UROGYNECOLOGY | Facility: CLINIC | Age: 50
End: 2025-06-26
Payer: COMMERCIAL

## 2025-06-26 DIAGNOSIS — I10 ESSENTIAL HYPERTENSION: Primary | ICD-10-CM

## 2025-06-26 DIAGNOSIS — N39.46 MIXED STRESS AND URGE URINARY INCONTINENCE: Primary | ICD-10-CM

## 2025-06-26 RX ORDER — CHLORTHALIDONE 25 MG/1
25 TABLET ORAL DAILY
Qty: 30 TABLET | Refills: 5 | Status: SHIPPED | OUTPATIENT
Start: 2025-06-26 | End: 2026-06-26

## 2025-06-26 NOTE — TELEPHONE ENCOUNTER
Please confirm pt's current regimen (I have her on irbesartan 300 mg nighttime) -- I will add chlorthalidone 25 mg daily to be taken in the daytime and send to [facility]    Please schedule for nurse BP visit in 2 weeks

## 2025-06-26 NOTE — TELEPHONE ENCOUNTER
Copied from CRM #[id number]. Topic: General Inquiry - Patient Advice  >> Jun 25, 2025 12:04 PM Rekha wrote:  .Type: Patient Call Back    Who called: Self     What is the request in detail: asked for a call back to see what the next plans are for her about her BP and if she needs to get back on her medicine if needed. The rheumatologist was supposed to send over her BP readings     Can the clinic reply by ANDRYSANANDA? No    Would the patient rather a call back or a response via [facility]? Call     Best call back number: [phone]      Additional Information:

## 2025-06-26 NOTE — TELEPHONE ENCOUNTER
Call was placed to patient to relay MD message, voicemail was left for a return call to clinic. Joosy message in regard to was sent as well.

## 2025-06-30 ENCOUNTER — APPOINTMENT (OUTPATIENT)
Dept: RADIOLOGY | Facility: HOSPITAL | Age: 50
End: 2025-06-30
Attending: ORTHOPAEDIC SURGERY
Payer: COMMERCIAL

## 2025-06-30 ENCOUNTER — TELEPHONE (OUTPATIENT)
Dept: UROGYNECOLOGY | Facility: CLINIC | Age: 50
End: 2025-06-30
Payer: COMMERCIAL

## 2025-06-30 ENCOUNTER — OFFICE VISIT (OUTPATIENT)
Dept: ORTHOPEDICS | Facility: CLINIC | Age: 50
End: 2025-06-30
Payer: COMMERCIAL

## 2025-06-30 DIAGNOSIS — M25.562 BILATERAL KNEE PAIN: ICD-10-CM

## 2025-06-30 DIAGNOSIS — M17.0 PRIMARY OSTEOARTHRITIS OF BOTH KNEES: Primary | ICD-10-CM

## 2025-06-30 DIAGNOSIS — M25.561 BILATERAL KNEE PAIN: ICD-10-CM

## 2025-06-30 PROCEDURE — 1159F MED LIST DOCD IN RCRD: CPT | Mod: CPTII,S$GLB,, | Performed by: ORTHOPAEDIC SURGERY

## 2025-06-30 PROCEDURE — 73564 X-RAY EXAM KNEE 4 OR MORE: CPT | Mod: 26,,, | Performed by: RADIOLOGY

## 2025-06-30 PROCEDURE — 99204 OFFICE O/P NEW MOD 45 MIN: CPT | Mod: S$GLB,,, | Performed by: ORTHOPAEDIC SURGERY

## 2025-06-30 PROCEDURE — 73564 X-RAY EXAM KNEE 4 OR MORE: CPT | Mod: TC,50,FY,PN

## 2025-06-30 PROCEDURE — 4010F ACE/ARB THERAPY RXD/TAKEN: CPT | Mod: CPTII,S$GLB,, | Performed by: ORTHOPAEDIC SURGERY

## 2025-06-30 PROCEDURE — 99999 PR PBB SHADOW E&M-EST. PATIENT-LVL II: CPT | Mod: PBBFAC,,, | Performed by: ORTHOPAEDIC SURGERY

## 2025-06-30 RX ORDER — VIBEGRON 75 MG/1
75 TABLET, FILM COATED ORAL DAILY
Qty: 30 TABLET | Refills: 11 | Status: SHIPPED | OUTPATIENT
Start: 2025-06-30

## 2025-07-14 NOTE — PROGRESS NOTES
Follow up visit    History of Present Illness:   Asha Paige comes to the office for follow up evaluation of bilateral knee arthritis   She has failed other treatment modalities including medications,  activity modification and steroid injection. She is here today for viscosupplementation - Euflexxa #1       MEDICAL NECESSITY FOR VISCOSUPPLEMENTATION:  A thorough evaluation of the patient, have determined that viscosupplementation is medically necessary.  The patient has painful DJD of the knee with failure of conservative therapy including lifestyle modifications and rehabilitation exercises.  Oral analgesics/NSAIDs have not adequately controlled symptoms and there is radiographic evidence of joint space narrowing, subchondral sclerosis, and osteophyte formation - Kellgren Maycol grade 2 or greater.      ROS:  unremarkable and no change since last visit    Physical Examination:    Vitals:    07/21/25 1541   PainSc:   3   PainLoc: Knee      NAD  bilateral knee  No change in exam   No evidence of adverse effect of injection     Radiographic imaging: no new imaging    Assessment/Plan:  49 y.o. female with bilateral  knee arthritis     Injection to Bilateral  knee  performed, please see procedure note for details.  We discussed the risks and benefits of injection including pain, infection, bleeding, failure to improve pain, temporary increase in pain, synovitis, and damage to surrounding structures including nerves, blood vessels, tendons and muscles.   2.   Return for follow up visit: 1 week for next injection    All questions were answered in detail. The patient  verbalized the understanding of the treatment plan and is in full agreement with the treatment plan.

## 2025-07-21 ENCOUNTER — HOSPITAL ENCOUNTER (OUTPATIENT)
Dept: RADIOLOGY | Facility: HOSPITAL | Age: 50
Discharge: HOME | End: 2025-07-21
Attending: INTERNAL MEDICINE
Payer: COMMERCIAL

## 2025-07-21 ENCOUNTER — OFFICE VISIT (OUTPATIENT)
Dept: ORTHOPEDICS | Facility: CLINIC | Age: 50
End: 2025-07-21
Payer: COMMERCIAL

## 2025-07-21 DIAGNOSIS — M17.0 PRIMARY OSTEOARTHRITIS OF BOTH KNEES: Primary | ICD-10-CM

## 2025-07-21 DIAGNOSIS — Z12.31 SCREENING MAMMOGRAM, ENCOUNTER FOR: ICD-10-CM

## 2025-07-21 PROCEDURE — 99999 PR PBB SHADOW E&M-EST. PATIENT-LVL III: CPT | Mod: PBBFAC,,, | Performed by: ORTHOPAEDIC SURGERY

## 2025-07-21 PROCEDURE — 77063 BREAST TOMOSYNTHESIS BI: CPT | Mod: 26,,, | Performed by: RADIOLOGY

## 2025-07-21 PROCEDURE — 77063 BREAST TOMOSYNTHESIS BI: CPT | Mod: TC

## 2025-07-21 PROCEDURE — 99499 UNLISTED E&M SERVICE: CPT | Mod: S$GLB,,, | Performed by: ORTHOPAEDIC SURGERY

## 2025-07-21 PROCEDURE — 20610 DRAIN/INJ JOINT/BURSA W/O US: CPT | Mod: 50,S$GLB,, | Performed by: ORTHOPAEDIC SURGERY

## 2025-07-21 PROCEDURE — 77067 SCR MAMMO BI INCL CAD: CPT | Mod: 26,,, | Performed by: RADIOLOGY

## 2025-07-22 NOTE — PROCEDURES
Large Joint Aspiration/Injection: bilateral knee    Date/Time: 7/21/2025 3:45 PM    Performed by: Caridad Agudelo MD  Authorized by: Caridad Agudelo MD    Consent Done?:  Yes (Verbal)  Indications:  Arthritis  Timeout: prior to procedure the correct patient, procedure, and site was verified    Prep: patient was prepped and draped in usual sterile fashion      Local anesthesia used?: Yes    Local anesthetic:  Topical anesthetic    Details:  Needle Size:  22 G  Approach:  Anteromedial  Location:  Knee  Laterality:  Bilateral  Site:  Bilateral knee  Medications (Right):  20 mg sodium hyaluronate (EUFLEXXA) 10 mg/mL(mw 2.4 -3.6 million)  Medications (Left):  20 mg sodium hyaluronate (EUFLEXXA) 10 mg/mL(mw 2.4 -3.6 million)  Patient tolerance:  Patient tolerated the procedure well with no immediate complications

## 2025-07-23 NOTE — PROGRESS NOTES
Follow up visit    History of Present Illness:   Asha Paige comes to the office for follow up evaluation of bilateral knee arthritis   She has failed other treatment modalities including medications,  activity modification and steroid injection. She is here today for viscosupplementation - Euflexxa #2       MEDICAL NECESSITY FOR VISCOSUPPLEMENTATION:  A thorough evaluation of the patient, have determined that viscosupplementation is medically necessary.  The patient has painful DJD of the knee with failure of conservative therapy including lifestyle modifications and rehabilitation exercises.  Oral analgesics/NSAIDs have not adequately controlled symptoms and there is radiographic evidence of joint space narrowing, subchondral sclerosis, and osteophyte formation - Kellgren Maycol grade 2 or greater.      ROS:  unremarkable and no change since last visit    Physical Examination:    Vitals:    07/31/25 1318   PainSc: 1    PainLoc: Knee      NAD  bilateral knee  No change in exam   No evidence of adverse effect of injection     Radiographic imaging: no new imaging    Assessment/Plan:  49 y.o. female with bilateral  knee arthritis     Injection to Bilateral  knee  performed, please see procedure note for details.  We discussed the risks and benefits of injection including pain, infection, bleeding, failure to improve pain, temporary increase in pain, synovitis, and damage to surrounding structures including nerves, blood vessels, tendons and muscles.   2.   Return for follow up visit: 1 week for next injection    All questions were answered in detail. The patient  verbalized the understanding of the treatment plan and is in full agreement with the treatment plan.

## 2025-07-25 ENCOUNTER — TELEPHONE (OUTPATIENT)
Dept: DERMATOLOGY | Facility: CLINIC | Age: 50
End: 2025-07-25
Payer: COMMERCIAL

## 2025-07-25 NOTE — TELEPHONE ENCOUNTER
Attempted to call pt LVM stating sandoval will need to be rescheduled due to provider beng out of office

## 2025-07-27 ENCOUNTER — PATIENT MESSAGE (OUTPATIENT)
Dept: FAMILY MEDICINE | Facility: CLINIC | Age: 50
End: 2025-07-27
Payer: COMMERCIAL

## 2025-07-28 ENCOUNTER — PATIENT MESSAGE (OUTPATIENT)
Dept: FAMILY MEDICINE | Facility: CLINIC | Age: 50
End: 2025-07-28
Payer: COMMERCIAL

## 2025-07-31 ENCOUNTER — OFFICE VISIT (OUTPATIENT)
Dept: ORTHOPEDICS | Facility: CLINIC | Age: 50
End: 2025-07-31
Payer: COMMERCIAL

## 2025-07-31 DIAGNOSIS — M17.0 PRIMARY OSTEOARTHRITIS OF BOTH KNEES: Primary | ICD-10-CM

## 2025-07-31 PROCEDURE — 99499 UNLISTED E&M SERVICE: CPT | Mod: S$GLB,,, | Performed by: ORTHOPAEDIC SURGERY

## 2025-07-31 PROCEDURE — 99999 PR PBB SHADOW E&M-EST. PATIENT-LVL III: CPT | Mod: PBBFAC,,, | Performed by: ORTHOPAEDIC SURGERY

## 2025-08-01 ENCOUNTER — HOSPITAL ENCOUNTER (EMERGENCY)
Facility: HOSPITAL | Age: 50
Discharge: HOME | End: 2025-08-01
Attending: EMERGENCY MEDICINE
Payer: COMMERCIAL

## 2025-08-01 VITALS
HEIGHT: 60 IN | TEMPERATURE: 98 F | WEIGHT: 259 LBS | BODY MASS INDEX: 50.85 KG/M2 | HEART RATE: 89 BPM | SYSTOLIC BLOOD PRESSURE: 145 MMHG | OXYGEN SATURATION: 98 % | DIASTOLIC BLOOD PRESSURE: 87 MMHG | RESPIRATION RATE: 17 BRPM

## 2025-08-01 DIAGNOSIS — L72.3 INFECTED SEBACEOUS CYST: Primary | ICD-10-CM

## 2025-08-01 DIAGNOSIS — L08.9 INFECTED SEBACEOUS CYST: Primary | ICD-10-CM

## 2025-08-01 PROCEDURE — 10061 I&D ABSCESS COMP/MULTIPLE: CPT

## 2025-08-01 PROCEDURE — 99284 EMERGENCY DEPT VISIT MOD MDM: CPT | Mod: 25

## 2025-08-01 PROCEDURE — 25000003 PHARM REV CODE 250

## 2025-08-01 PROCEDURE — 10060 I&D ABSCESS SIMPLE/SINGLE: CPT

## 2025-08-01 PROCEDURE — 63600175 PHARM REV CODE 636 W HCPCS

## 2025-08-01 RX ORDER — MUPIROCIN 20 MG/G
OINTMENT TOPICAL 3 TIMES DAILY
Qty: 22 G | Refills: 0 | Status: SHIPPED | OUTPATIENT
Start: 2025-08-01

## 2025-08-01 RX ORDER — MUPIROCIN 20 MG/G
1 OINTMENT TOPICAL
Status: COMPLETED | OUTPATIENT
Start: 2025-08-01 | End: 2025-08-01

## 2025-08-01 RX ORDER — SULFAMETHOXAZOLE AND TRIMETHOPRIM 800; 160 MG/1; MG/1
1 TABLET ORAL 2 TIMES DAILY
Qty: 10 TABLET | Refills: 0 | Status: SHIPPED | OUTPATIENT
Start: 2025-08-01 | End: 2025-08-06

## 2025-08-01 RX ORDER — LIDOCAINE HYDROCHLORIDE 10 MG/ML
10 INJECTION, SOLUTION INFILTRATION; PERINEURAL
Status: COMPLETED | OUTPATIENT
Start: 2025-08-01 | End: 2025-08-01

## 2025-08-01 RX ADMIN — MUPIROCIN 1 TUBE: 20 OINTMENT TOPICAL at 08:08

## 2025-08-01 RX ADMIN — LIDOCAINE HYDROCHLORIDE 10 ML: 10 INJECTION, SOLUTION INFILTRATION; PERINEURAL at 08:08

## 2025-08-04 ENCOUNTER — OFFICE VISIT (OUTPATIENT)
Dept: ORTHOPEDICS | Facility: CLINIC | Age: 50
End: 2025-08-04
Payer: COMMERCIAL

## 2025-08-04 ENCOUNTER — TELEPHONE (OUTPATIENT)
Dept: ORTHOPEDICS | Facility: CLINIC | Age: 50
End: 2025-08-04
Payer: COMMERCIAL

## 2025-08-04 DIAGNOSIS — M17.0 PRIMARY OSTEOARTHRITIS OF BOTH KNEES: Primary | ICD-10-CM

## 2025-08-04 DIAGNOSIS — M25.562 CHRONIC PAIN OF BOTH KNEES: ICD-10-CM

## 2025-08-04 DIAGNOSIS — G89.29 CHRONIC PAIN OF BOTH KNEES: ICD-10-CM

## 2025-08-04 DIAGNOSIS — M25.561 CHRONIC PAIN OF BOTH KNEES: ICD-10-CM

## 2025-08-04 PROCEDURE — 99499 UNLISTED E&M SERVICE: CPT | Mod: S$GLB,,,

## 2025-08-04 PROCEDURE — 99999 PR PBB SHADOW E&M-EST. PATIENT-LVL III: CPT | Mod: PBBFAC,,,

## 2025-08-04 PROCEDURE — 20610 DRAIN/INJ JOINT/BURSA W/O US: CPT | Mod: 50,S$GLB,,

## 2025-08-04 NOTE — PROGRESS NOTES
Follow up visit    History of Present Illness:   Asha Paige comes to the office for follow up evaluation of bilateral knee arthritis   She has failed other treatment modalities including medications,  activity modification and steroid injection. She is here today for viscosupplementation - Euflexxa #3       MEDICAL NECESSITY FOR VISCOSUPPLEMENTATION:  A thorough evaluation of the patient, have determined that viscosupplementation is medically necessary.  The patient has painful DJD of the knee with failure of conservative therapy including lifestyle modifications and rehabilitation exercises.  Oral analgesics/NSAIDs have not adequately controlled symptoms and there is radiographic evidence of joint space narrowing, subchondral sclerosis, and osteophyte formation - Kellgren Maycol grade 2 or greater.      ROS:  unremarkable and no change since last visit    Physical Examination:    There were no vitals filed for this visit.     NAD  bilateral knee  No change in exam   No evidence of adverse effect of injection     Radiographic imaging: no new imaging    Assessment/Plan:  49 y.o. female with bilateral  knee arthritis     Injection to Bilateral  knee  performed, please see procedure note for details.  We discussed the risks and benefits of injection including pain, infection, bleeding, failure to improve pain, temporary increase in pain, synovitis, and damage to surrounding structures including nerves, blood vessels, tendons and muscles.   2.   Return for follow up visit: PRN    All questions were answered in detail. The patient  verbalized the understanding of the treatment plan and is in full agreement with the treatment plan.

## 2025-08-04 NOTE — PROCEDURES
Large Joint Aspiration/Injection: bilateral knee    Date/Time: 8/4/2025 3:30 PM    Performed by: Tootie Smith PA-C  Authorized by: Tootie Smith PA-C    Timeout: prior to procedure the correct patient, procedure, and site was verified      Local anesthesia used?: Yes    Local anesthetic:  Topical anesthetic    Details:  Needle Size:  22 G  Ultrasonic Guidance for needle placement?: No    Approach:  Anterolateral  Location:  Knee  Laterality:  Bilateral  Site:  Bilateral knee  Medications (Right):  10 mg sodium hyaluronate (EUFLEXXA) 10 mg/mL(mw 2.4 -3.6 million)  Medications (Left):  10 mg sodium hyaluronate (EUFLEXXA) 10 mg/mL(mw 2.4 -3.6 million)  Patient tolerance:  Patient tolerated the procedure well with no immediate complications

## 2025-08-04 NOTE — TELEPHONE ENCOUNTER
Called pt to let her know that its fine that she comes in today for the injection and can get billed. No answer LVM

## 2025-08-07 ENCOUNTER — PATIENT MESSAGE (OUTPATIENT)
Dept: RHEUMATOLOGY | Facility: CLINIC | Age: 50
End: 2025-08-07
Payer: COMMERCIAL

## 2025-08-15 ENCOUNTER — OFFICE VISIT (OUTPATIENT)
Dept: FAMILY MEDICINE | Facility: CLINIC | Age: 50
End: 2025-08-15
Payer: COMMERCIAL

## 2025-08-15 DIAGNOSIS — E66.813 CLASS 3 OBESITY: ICD-10-CM

## 2025-08-15 DIAGNOSIS — I10 ESSENTIAL HYPERTENSION: Primary | ICD-10-CM

## 2025-08-15 PROCEDURE — 4010F ACE/ARB THERAPY RXD/TAKEN: CPT | Mod: CPTII,95,, | Performed by: INTERNAL MEDICINE

## 2025-08-15 PROCEDURE — 1159F MED LIST DOCD IN RCRD: CPT | Mod: CPTII,95,, | Performed by: INTERNAL MEDICINE

## 2025-08-15 PROCEDURE — 98006 SYNCH AUDIO-VIDEO EST MOD 30: CPT | Mod: 95,,, | Performed by: INTERNAL MEDICINE

## 2025-08-15 RX ORDER — IRBESARTAN 150 MG/1
TABLET ORAL
COMMUNITY
Start: 2025-07-15 | End: 2025-08-15

## 2025-08-15 RX ORDER — LABETALOL 100 MG/1
100 TABLET, FILM COATED ORAL 2 TIMES DAILY
Qty: 60 TABLET | Refills: 11 | Status: SHIPPED | OUTPATIENT
Start: 2025-08-15 | End: 2026-08-15

## 2025-08-18 ENCOUNTER — TELEPHONE (OUTPATIENT)
Dept: RHEUMATOLOGY | Facility: CLINIC | Age: 50
End: 2025-08-18
Payer: COMMERCIAL